# Patient Record
Sex: FEMALE | Race: BLACK OR AFRICAN AMERICAN | Employment: PART TIME | URBAN - METROPOLITAN AREA
[De-identification: names, ages, dates, MRNs, and addresses within clinical notes are randomized per-mention and may not be internally consistent; named-entity substitution may affect disease eponyms.]

---

## 2017-01-12 ENCOUNTER — OFFICE VISIT (OUTPATIENT)
Dept: INTERNAL MEDICINE | Facility: CLINIC | Age: 42
End: 2017-01-12
Payer: COMMERCIAL

## 2017-01-12 VITALS
BODY MASS INDEX: 36.43 KG/M2 | HEART RATE: 66 BPM | SYSTOLIC BLOOD PRESSURE: 104 MMHG | HEIGHT: 66 IN | TEMPERATURE: 98.2 F | WEIGHT: 226.7 LBS | OXYGEN SATURATION: 98 % | DIASTOLIC BLOOD PRESSURE: 72 MMHG
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
## 2017-01-12 DIAGNOSIS — S39.012D STRAIN OF LUMBAR REGION, SUBSEQUENT ENCOUNTER: Primary | ICD-10-CM

## 2017-01-12 PROCEDURE — 99214 OFFICE O/P EST MOD 30 MIN: CPT | Performed by: INTERNAL MEDICINE
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
## 2017-01-24 ENCOUNTER — OFFICE VISIT (OUTPATIENT)
Dept: OBGYN | Facility: CLINIC | Age: 42
End: 2017-01-24
Payer: OTHER GOVERNMENT

## 2017-01-24 VITALS
OXYGEN SATURATION: 98 % | WEIGHT: 226 LBS | SYSTOLIC BLOOD PRESSURE: 102 MMHG | DIASTOLIC BLOOD PRESSURE: 70 MMHG | HEART RATE: 76 BPM | TEMPERATURE: 98.3 F | BODY MASS INDEX: 36.49 KG/M2

## 2017-01-24 DIAGNOSIS — R10.2 PELVIC PAIN IN FEMALE: Primary | ICD-10-CM

## 2017-01-24 DIAGNOSIS — N93.9 ABNORMAL UTERINE BLEEDING (AUB): ICD-10-CM

## 2017-01-24 PROCEDURE — 99214 OFFICE O/P EST MOD 30 MIN: CPT | Performed by: OBSTETRICS & GYNECOLOGY

## 2017-01-24 NOTE — PROGRESS NOTES
SUBJECTIVE:                                                   Melissa Painter is a 41 year old  female who presents to clinic today for evaluation of pelvic pain and pressure which has increased significantly this month but going on infrequently over the past few months. Now pain occurs 1-2x/day, stabbing pain in lower abdomen, R>L. Maybe worse with activity, but unsure. Improves with BM. Ibuprofen helps a little. Does not appear to be cyclic this month, but previously may have been associated with menstruation.  - Struggles with constipation, tries eating more fruits and veg. BMs 1-2x/day  - No vaginal discharge/itching/burning    Menses are heavy, occuring once monthly, lasting 5 days. Patient's last menstrual period was 2017 (approximate). Occasional spotting.     + Dyspareunia. No vaginal dryness    Problem list and histories reviewed & adjusted, as indicated.  Additional history: as documented.    ROS:  Const: + weight gain of 20lb in 4 months. No fever, chills  : no dysuria, hematuria, urinary frequency, urgency, hesitancy  GI: + constipation, no diarrhea    Patient Active Problem List   Diagnosis     Iron deficiency anemia, unspecified iron deficiency     Chronic fatigue     Vitamin D deficiency     Hair loss     Immunity status testing     Excessive or frequent menstruation     CARDIOVASCULAR SCREENING; LDL GOAL LESS THAN 160     Seasonal allergic rhinitis     Rectocele     Cystocele, midline     Urgency-frequency syndrome     Cervicalgia     Acute bilateral low back pain without sciatica     Past Surgical History   Procedure Laterality Date     As hysteros w permanent fallopain implant       ESSURE     [place] teeth        Social History   Substance Use Topics     Smoking status: Never Smoker      Smokeless tobacco: Never Used     Alcohol Use: 0.0 oz/week     0 Standard drinks or equivalent per week      Comment: 2-3 glasses wine/yr      Problem (# of Occurrences) Relation (Name,Age of Onset)     Hypertension (1) Mother              Current Outpatient Prescriptions on File Prior to Visit:  IBUPROFEN PO    cyclobenzaprine (FLEXERIL) 5 MG tablet Take 1 tablet (5 mg) by mouth 3 times daily as needed for muscle spasms   ibuprofen (ADVIL,MOTRIN) 600 MG tablet Take 1 tablet (600 mg) by mouth every 6 hours as needed for moderate pain   Calcium Carb-Cholecalciferol (CALCIUM 1000 + D) 1000-800 MG-UNIT TABS Take 1 tablet by mouth daily TAKE WITH FOOD, FOR BONE HEALTH AND FOR VITAMIN D SUPPLEMENTATION   cholecalciferol 5000 UNITS CAPS Take 1 capsule (5,000 Units) by mouth daily FOR VITAMIN D DEFICIENCY (LOW VITAMIN D),TAKE 2 CAPSULES FOR THE FIRST 3 WEEKS, THEN 1 CAPSULE DAILY, MUST TAKE WITH WITH FAT CONTAINING MEAL   ferrous fumarate 65 mg, [place]. FE,-Vitamin C 125 mg (VITRON C)  MG TABS Take 1 tablet by mouth 2 times daily (before meals) INDICATION: IRON SUPPLEMENT   fluticasone (FLONASE) 50 MCG/ACT nasal spray Spray 2 sprays in nostril At Bedtime INDICATION: TO CONTROL NASAL ALLERGY SYMPTOMS   cetirizine (ZYRTEC) 10 MG tablet Take 1 tablet (10 mg) by mouth daily TO TREAT NASAL ALLERGIES     No current facility-administered medications on file prior to visit.  Allergies   Allergen Reactions     Levaquin [Levofloxacin] Itching     Influenza Vaccine Live        OBJECTIVE:   /70 mmHg  Pulse 76  Temp(Src) 98.3  F (36.8  C) (Oral)  Wt 226 lb (102.513 kg)  SpO2 98%  LMP 01/13/2017 (Approximate)   Gen: sitting in chair in no acute distress, comfortable  HENT: no scleral icterus, thyroid normal size  CV: regular rate, well perfused  Pulm: no increased work of breathing, no cough  Skin: warm and dry, no rashes/lesions  Psych: mood stable, appropriate affect  Neuro: A+Ox3   : External genitalia normal well-estrogenized, healthy tissue.  No obvious excoriations, lesions, or rashes. Bartholins, urethra, normal.  Normal moist pink vaginal mucosa.  SSE: Cervix enlarged and bulky, normal physiologic discharge.    Bimanual: No CMT, midline uterus, 6 week size. No adnexal masses or tenderness appreciated.       ASSESSMENT/PLAN:                                                    Melissa Painter is a 41 year old female  here for worsening pelvic pain. Exam concerning for fibroid uterus which may or may not be contributing to pelvic pain. Not reproducible on exam. Unclear whether GI given improvement with BM, MSK given association with activity, or .     1. Pelvic pain in female  - US Pelvic Complete with Transvaginal; Future   - will record timing and associated symptoms, history unclear at this time  - will try regular fiber supplements or stool softeners, increased activity particularly after meals, and work on weight loss    2. AUB  - Intermenstrual spotting, eval with pelvic US  - endometrial biopsy if indicated by US findings    Return to clinic in 2-4 weeks s/p US    Today I spent 40 minutes with the patient, of which > 50% was spent reviewing pelvic pain, abnormal uterine bleeding, weight loss, diet, and exercise.     Merari Rodney MD  Obstetrics and Gynecology   [facility]

## 2017-01-24 NOTE — NURSING NOTE
"Chief Complaint   Patient presents with     RECHECK     essure       Initial /70 mmHg  Pulse 76  Temp(Src) 98.3  F (36.8  C) (Oral)  Wt 226 lb (102.513 kg)  SpO2 98%  LMP 2017 (Approximate) Estimated body mass index is 36.49 kg/(m^2) as calculated from the following:    Height as of 17: 5' 6\" (1.676 m).    Weight as of this encounter: 226 lb (102.513 kg).  BP completed using cuff size: regular        The following HM Due: NONE      The following patient reported/Care Every where data was sent to:  P ABSTRACT QUALITY INITIATIVES [[id number]]              Pt states having discomfort in the pelvic area  Spotting two weeks before period.  Light first day light next days 2-3  heavy day 4 stops, day  5 heavy and then stops    Haley Bryant CMA         "

## 2017-01-27 ENCOUNTER — RADIANT APPOINTMENT (OUTPATIENT)
Dept: ULTRASOUND IMAGING | Facility: CLINIC | Age: 42
End: 2017-01-27
Attending: OBSTETRICS & GYNECOLOGY
Payer: OTHER GOVERNMENT

## 2017-01-27 DIAGNOSIS — R10.2 PELVIC PAIN IN FEMALE: ICD-10-CM

## 2017-01-27 PROCEDURE — 76830 TRANSVAGINAL US NON-OB: CPT | Performed by: OBSTETRICS & GYNECOLOGY

## 2017-01-27 PROCEDURE — 76856 US EXAM PELVIC COMPLETE: CPT | Performed by: OBSTETRICS & GYNECOLOGY

## 2017-02-06 ENCOUNTER — OFFICE VISIT (OUTPATIENT)
Dept: OBGYN | Facility: CLINIC | Age: 42
End: 2017-02-06
Payer: OTHER GOVERNMENT

## 2017-02-06 VITALS
SYSTOLIC BLOOD PRESSURE: 100 MMHG | DIASTOLIC BLOOD PRESSURE: 68 MMHG | HEART RATE: 65 BPM | OXYGEN SATURATION: 98 % | WEIGHT: 222.3 LBS | HEIGHT: 66 IN | RESPIRATION RATE: 16 BRPM | BODY MASS INDEX: 35.72 KG/M2

## 2017-02-06 DIAGNOSIS — N83.8 OVARIAN MASS, RIGHT: Primary | ICD-10-CM

## 2017-02-06 DIAGNOSIS — N93.9 ABNORMAL UTERINE BLEEDING (AUB): ICD-10-CM

## 2017-02-06 DIAGNOSIS — R10.2 PELVIC PAIN IN FEMALE: ICD-10-CM

## 2017-02-06 PROCEDURE — 88305 TISSUE EXAM BY PATHOLOGIST: CPT | Performed by: OBSTETRICS & GYNECOLOGY

## 2017-02-06 PROCEDURE — 99213 OFFICE O/P EST LOW 20 MIN: CPT | Mod: 25 | Performed by: OBSTETRICS & GYNECOLOGY

## 2017-02-06 PROCEDURE — 58100 BIOPSY OF UTERUS LINING: CPT | Performed by: OBSTETRICS & GYNECOLOGY

## 2017-02-06 NOTE — NURSING NOTE
"Chief Complaint   Patient presents with     Results     US       Initial /68 mmHg  Pulse 65  Resp 16  Ht 5' 6\" (1.676 m)  Wt 222 lb 4.8 oz (100.835 kg)  BMI 35.90 kg/m2  SpO2 98%  LMP 01/13/2017 (Approximate) Estimated body mass index is 35.9 kg/(m^2) as calculated from the following:    Height as of this encounter: 5' 6\" (1.676 m).    Weight as of this encounter: 222 lb 4.8 oz (100.835 kg).  Medication Reconciliation: incomplete   States pain is the same  Consent signed for Endometrial biopsy  Vivian Negrete LPN      "

## 2017-02-06 NOTE — PROGRESS NOTES
"F/u AUB and abdominal pain    S: Melissa Painter is a 41 year old female here for f/u of abd pain and AUB. Patient's last menstrual period was 01/13/2017 (approximate). Pain improved somewhat with stool softeners, now a 3-4/10, occuring every other day. Cannot recall any other associated exacerbating or alleviating features.    ROS:   GI: continues to have BMs 1-2x daily, now softer and less straining  : no further intermenstrual bleeding    O: /68 mmHg  Pulse 65  Resp 16  Ht 5' 6\" (1.676 m)  Wt 222 lb 4.8 oz (100.835 kg)  BMI 35.90 kg/m2  SpO2 98%  LMP 01/13/2017 (Approximate)   Gen: resting comfortably, in NAD    PROCEDURE;                                                    Pregnancy test not indicated, s/p Essure procedure    A speculum was placed in the vagina and cervix prepped with betadine. A tenaculum was not attached to the cervix. A small plastic 5 mm Pipelle syringe curette was inserted into the cervical canal. The uterus was sounded to 10 cm's. A vigorous four quadrant biopsy was performed, removing amount large  of tissue. The speculum was removed. This tissue was placed in Formalin and sent to pathology.    The patient tolerated the procedure  well and she reported there was minimal cramping.      POST PROCEDURE;                                                    Melissa Painter is a 41 year old female  here to review US results and continue work up for pelvic pain and AUB.    There  was  mild cramping at the time of discharge. She  tolerated the procedure well. There were no complications. Patient was discharged in stable condition.    US results:  - thickened EMS of 21mm: endometrial biopsy today  - 2cm complex right ovarian cyst: repeat US in 2 months    Pelvic pain:  - reports substantial improvement with high fiber diet and stool softeners, still some difficulty determining timing of pain and exacerbating features.   - continue high fiber and stool softeners.   - no evidence of " US findings to explain pelvic pain    F/u in 2 months after repeat US to evaluate right complex ovarian cyst.    Patient advised to call the clinic if severe pelvic pain, fever or heavy bleeding.    Outside of the procedure I spent 15 minutes with the patient, of which >50% minutes was spent reviewing causes of lower abdominal pain and management options as well as etiology and management of ovarian cysts.     Merari Rodney MD

## 2017-02-08 LAB — COPATH REPORT: NORMAL

## 2017-03-07 ENCOUNTER — OFFICE VISIT (OUTPATIENT)
Dept: OBGYN | Facility: CLINIC | Age: 42
End: 2017-03-07
Payer: OTHER GOVERNMENT

## 2017-03-07 VITALS
DIASTOLIC BLOOD PRESSURE: 70 MMHG | OXYGEN SATURATION: 98 % | WEIGHT: 220 LBS | BODY MASS INDEX: 35.51 KG/M2 | HEART RATE: 66 BPM | SYSTOLIC BLOOD PRESSURE: 102 MMHG

## 2017-03-07 DIAGNOSIS — N83.291 COMPLEX CYST OF RIGHT OVARY: ICD-10-CM

## 2017-03-07 DIAGNOSIS — N94.6 DYSMENORRHEA: ICD-10-CM

## 2017-03-07 DIAGNOSIS — N93.9 ABNORMAL UTERINE BLEEDING (AUB): Primary | ICD-10-CM

## 2017-03-07 DIAGNOSIS — G89.29 CHRONIC PELVIC PAIN IN FEMALE: ICD-10-CM

## 2017-03-07 DIAGNOSIS — R10.2 CHRONIC PELVIC PAIN IN FEMALE: ICD-10-CM

## 2017-03-07 PROCEDURE — 99214 OFFICE O/P EST MOD 30 MIN: CPT | Performed by: OBSTETRICS & GYNECOLOGY

## 2017-03-07 RX ORDER — CYCLOBENZAPRINE HCL 5 MG
TABLET ORAL
COMMUNITY
Start: 2016-11-23 | End: 2017-05-30

## 2017-03-07 NOTE — MR AVS SNAPSHOT
After Visit Summary   3/7/2017    Melissa Painter    MRN: [id number]           Patient Information     Date Of Birth          1975        Visit Information        Provider Department      3/7/2017 3:45 PM Merari Rodney MD [facility]        Today's Diagnoses     Abnormal uterine bleeding (AUB)    -  1    Dysmenorrhea        Chronic pelvic pain in female        Complex cyst of right ovary           Follow-ups after your visit        Your next 10 appointments already scheduled     Mar 23, 2017 10:30 AM CDT   Office Visit with Kaci Francisco MD   [facility]    [address] [place] [address] [id number] [phone]           Bring a current list of meds and any records pertaining to this visit.  For Physicals, please bring immunization records and any forms needing to be filled out.  Please arrive 10 minutes early to complete paperwork.            Mar 30, 2017  9:45 AM CDT   US PELVIC COMPLETE W TRANSVAGINAL with OXUS1   [facility]    [address] [place] [address] [id number] [phone]           Please bring a list of your medicines (including vitamins, minerals and over-the-counter drugs). Also, tell your doctor about any allergies you may have. Wear comfortable clothes and leave your valuables at home.  Adults: Drink six 8-ounce glasses of fluid one hour before your exam. Do NOT empty your bladder.  If you need to empty your bladder before your exam, try to release only a little bit of urine. Then, drink another 8oz glass of fluid.  Children: Children who are potty trained should drink at least 4 cups (32 oz) of liquid 45 minutes to one hour prior to the exam. The child s bladder must be full in order to achieve a diagnostic exam. If your child is very uncomfortable or has an urgent need to pee, please notify a technologist;  they will try to find out how much longer the wait may be and provide instructions to help relieve the pressure. Occasionally it is medically necessary to insert a urinary catheter to fill the bladder.  Please call the Imaging Department at your exam site with any questions.              Who to contact     If you have questions or need follow up information about today's clinic visit or your schedule please contact [facility] directly at [phone].  Normal or non-critical lab and imaging results will be communicated to you by [employer], letter or phone within 4 business days after the clinic has received the results. If you do not hear from us within 7 days, please contact the clinic through [employer] or phone. If you have a critical or abnormal lab result, we will notify you by phone as soon as possible.  Submit refill requests through [employer] or call your pharmacy and they will forward the refill request to us. Please allow 3 business days for your refill to be completed.          Additional Information About Your Visit        [employer] Information     [employer] gives you secure access to your electronic health record. If you see a primary care provider, you can also send messages to your care team and make appointments. If you have questions, please call your primary care clinic.  If you do not have a primary care provider, please call [phone] and they will assist you.        Care EveryWhere ID     This is your Care EveryWhere ID. This could be used by other organizations to access your [facility] medical records  [id number]        Your Vitals Were     Pulse Last Period Pulse Oximetry BMI (Body Mass Index)          66 02/08/2017 (Exact Date) 98% 35.51 kg/m2         Blood Pressure from Last 3 Encounters:   03/07/17 102/70   02/06/17 100/68   01/24/17 102/70    Weight from Last 3 Encounters:   03/07/17 220 lb (99.8 kg)   02/06/17 222 lb 4.8 oz (100.8 kg)   01/24/17 226 lb (102.5 kg)               Today, you had the following     No orders found for display       Primary Care Provider Office Phone # Fax #    Marthanina NINA Francisco [phone]       [facility] [address] [facility] [address]        Thank you!     Thank you for choosing [facility]  for your care. Our goal is always to provide you with excellent care. Hearing back from our patients is one way we can continue to improve our services. Please take a few minutes to complete the written survey that you may receive in the mail after your visit with us. Thank you!             Your Updated Medication List - Protect others around you: Learn how to safely use, store and throw away your medicines at www.disposemymeds.org.          This list is accurate as of: 3/7/17  4:34 PM.  Always use your most recent med list.                   Brand Name Dispense Instructions for use    Calcium Carb-Cholecalciferol 1000-800 MG-UNIT Tabs    CALCIUM 1000 + D    100 tablet    Take 1 tablet by mouth daily TAKE WITH FOOD, FOR BONE HEALTH AND FOR VITAMIN D SUPPLEMENTATION       cholecalciferol 5000 UNITS Caps     100 capsule    Take 1 capsule (5,000 Units) by mouth daily FOR VITAMIN D DEFICIENCY (LOW VITAMIN D),TAKE 2 CAPSULES FOR THE FIRST 3 WEEKS, THEN 1 CAPSULE DAILY, MUST TAKE WITH WITH FAT CONTAINING MEAL       cyclobenzaprine 5 MG tablet    FLEXERIL         ferrous fumarate 65 mg ([place]. FE)-Vitamin C 125 mg  MG Tabs tablet    VITRON C    100 tablet    Take 1 tablet by mouth 2 times daily (before meals) INDICATION: IRON SUPPLEMENT       fluticasone 50 MCG/ACT spray    FLONASE    16 g    Spray 2 sprays in nostril At Bedtime INDICATION: TO CONTROL NASAL ALLERGY SYMPTOMS       IBUPROFEN PO

## 2017-03-07 NOTE — NURSING NOTE
"Chief Complaint   Patient presents with     RECHECK       Initial /70  Pulse 66  Wt 220 lb (99.8 kg)  LMP 2017 (Exact Date)  SpO2 98%  BMI 35.51 kg/m2 Estimated body mass index is 35.51 kg/(m^2) as calculated from the following:    Height as of 17: 5' 6\" (1.676 m).    Weight as of this encounter: 220 lb (99.8 kg).  BP completed using cuff size: regular        The following HM Due: NONE      The following patient reported/Care Every where data was sent to:  P ABSTRACT QUALITY INITIATIVES [[id number]]       Longer cycle -13  Heavier with clots  Cramping before cycle    Haley Bryant, [facility]                "

## 2017-03-07 NOTE — PROGRESS NOTES
Gyn Visit    CC: AUB/Pelvic pain follow up    HPI: Melissa Solano is a 42 year old  female here for follow up. She reports that pain has essentially resolved with stool softeners and regular BMs. She Continues to have heavy periods with clots, Patient's last menstrual period was 2017 (exact date). Increasing dysmenorrhea.       O: /70  Pulse 66  Wt 220 lb (99.8 kg)  LMP 2017 (Exact Date)  SpO2 98%  BMI 35.51 kg/m2   Gen: resting comfortably, in NAD  No further physical exam indicated      Reviewed endometrial biopsy and US results as follows:    Indications for ultrasound:   Pain - Pelvic pain   LMP:  Hormones: no- essure   Measurements:   Uterus: 11.5 x 5.6 x 5.8 cm.   Position is anteverted. Contour is smth/reg.   Endo cav: 21 mm Smooth/regular/wnl   Cervix; wnl   Right ovary: 1.9 x 3.3 x 2.8 cm. Complex cyst 2.2 x 1.9 x 1.9 cm   Left ovary: 2.8 x 3.2 x 2.5 cm. Dominant follicle 2.0 x 1.6 x 1.9 cm   Cul de sac: free fluid - 1.5 x 1.0 cm   *Other findings:   ===================================   Complete pelvic ultrasound utilizing both abdominal and vaginal transducers. Small ,complex right ovarian cyst. Small amount of free fluid in the posterior cul de sac. Recommend repeat ultrasound in 6 -8 weeks.     Endometrial biopsy:  Patient Name: MELISSA SOLANO   MR#: [id number]   Specimen #: [id number]   Collected: 2017   Received: 2017   Reported: 2017 16:22   Ordering Phy(s): NESTOR CONCEPCION   Additional Phy(s): SHARON BUENO       SPECIMEN(S):   Endometrial biopsy     FINAL DIAGNOSIS:   Endometrium, biopsy   - Late secretory endometrium   - No evidence of chronic endometritis   - No hyperplasia or malignancy       A/P: Melissa Solano is a 42 year old female  here for pelvic pain and AUB follow up.    1. Abnormal uterine bleeding (AUB)  - No US evidence of structural abnormalities to explain heavy vaginal bleeding.   - endometrial biopsy w/o evidence of  malignancy or premalignant changes  - discussed management options including oral contraceptives, mirena IUD, and endometrial ablation. Patient reports hx of continuous bleeding on oral contraceptives and prefers to avoid these. She will consider Mirena vs ablation, hand outs provided. She has essure for permanent sterilization and therefore would not need further contraception for an ablation.     2. Dysmenorrhea  - discussed ovulatory suppression vs endometrial ablation for management of painful periods, see above    3. Chronic pelvic pain in female  - resolved with stool softeners, likely GI in origin     4. Complex right ovarian cyst  - repeat US in 8 weeks    Total time spent was 25 minutes; greater than 50% of time was spent in counseling and/or coordination of care for the above listed diagnoses.      F/u after repeat US.    Merari Rodney MD

## 2017-03-30 ENCOUNTER — RADIANT APPOINTMENT (OUTPATIENT)
Dept: ULTRASOUND IMAGING | Facility: CLINIC | Age: 42
End: 2017-03-30
Attending: OBSTETRICS & GYNECOLOGY
Payer: OTHER GOVERNMENT

## 2017-03-30 DIAGNOSIS — N83.8 OVARIAN MASS, RIGHT: ICD-10-CM

## 2017-03-30 PROCEDURE — 76856 US EXAM PELVIC COMPLETE: CPT | Performed by: OBSTETRICS & GYNECOLOGY

## 2017-03-30 PROCEDURE — 76830 TRANSVAGINAL US NON-OB: CPT | Mod: 59 | Performed by: OBSTETRICS & GYNECOLOGY

## 2017-05-04 DIAGNOSIS — J30.2 SEASONAL ALLERGIC RHINITIS: Primary | ICD-10-CM

## 2017-05-04 RX ORDER — CETIRIZINE HYDROCHLORIDE 10 MG/1
TABLET ORAL
Qty: 90 TABLET | Refills: 2 | Status: SHIPPED | OUTPATIENT
Start: 2017-05-04

## 2017-05-30 ENCOUNTER — OFFICE VISIT (OUTPATIENT)
Dept: INTERNAL MEDICINE | Facility: CLINIC | Age: 42
End: 2017-05-30
Payer: OTHER GOVERNMENT

## 2017-05-30 VITALS
WEIGHT: 224.7 LBS | RESPIRATION RATE: 16 BRPM | BODY MASS INDEX: 36.11 KG/M2 | SYSTOLIC BLOOD PRESSURE: 110 MMHG | DIASTOLIC BLOOD PRESSURE: 84 MMHG | HEART RATE: 70 BPM | OXYGEN SATURATION: 98 % | TEMPERATURE: 98.2 F | HEIGHT: 66 IN

## 2017-05-30 DIAGNOSIS — R71.8 LOW MEAN CORPUSCULAR VOLUME (MCV): ICD-10-CM

## 2017-05-30 DIAGNOSIS — E55.9 VITAMIN D DEFICIENCY: ICD-10-CM

## 2017-05-30 DIAGNOSIS — R53.82 CHRONIC FATIGUE: Primary | ICD-10-CM

## 2017-05-30 DIAGNOSIS — L65.9 HAIR LOSS: ICD-10-CM

## 2017-05-30 DIAGNOSIS — Z13.6 CARDIOVASCULAR SCREENING; LDL GOAL LESS THAN 160: ICD-10-CM

## 2017-05-30 DIAGNOSIS — E61.1 IRON DEFICIENCY: ICD-10-CM

## 2017-05-30 DIAGNOSIS — Z12.31 VISIT FOR SCREENING MAMMOGRAM: ICD-10-CM

## 2017-05-30 DIAGNOSIS — R93.7 ABNORMAL X-RAY OF THORACIC SPINE: ICD-10-CM

## 2017-05-30 DIAGNOSIS — R79.89 ELEVATED VITAMIN B12 LEVEL: ICD-10-CM

## 2017-05-30 PROBLEM — B18.1 HEPATITIS B CARRIER (H): Status: ACTIVE | Noted: 2017-05-30

## 2017-05-30 LAB
ALBUMIN SERPL-MCNC: 3.6 G/DL (ref 3.4–5)
ALBUMIN UR-MCNC: NEGATIVE MG/DL
ALP SERPL-CCNC: 51 U/L (ref 40–150)
ALT SERPL W P-5'-P-CCNC: 18 U/L (ref 0–50)
ANION GAP SERPL CALCULATED.3IONS-SCNC: 8 MMOL/L (ref 3–14)
APPEARANCE UR: CLEAR
AST SERPL W P-5'-P-CCNC: 14 U/L (ref 0–45)
BACTERIA #/AREA URNS HPF: ABNORMAL /HPF
BILIRUB SERPL-MCNC: 0.6 MG/DL (ref 0.2–1.3)
BILIRUB UR QL STRIP: NEGATIVE
BUN SERPL-MCNC: 14 MG/DL (ref 7–30)
CALCIUM SERPL-MCNC: 8.7 MG/DL (ref 8.5–10.1)
CHLORIDE SERPL-SCNC: 106 MMOL/L (ref 94–109)
CO2 SERPL-SCNC: 25 MMOL/L (ref 20–32)
COLOR UR AUTO: YELLOW
CREAT SERPL-MCNC: 0.71 MG/DL (ref 0.52–1.04)
FERRITIN SERPL-MCNC: 10 NG/ML (ref 12–150)
GFR SERPL CREATININE-BSD FRML MDRD: NORMAL ML/MIN/1.7M2
GLUCOSE SERPL-MCNC: 89 MG/DL (ref 70–99)
GLUCOSE UR STRIP-MCNC: NEGATIVE MG/DL
HGB UR QL STRIP: NEGATIVE
IRON SATN MFR SERPL: 43 % (ref 15–46)
IRON SERPL-MCNC: 126 UG/DL (ref 35–180)
KETONES UR STRIP-MCNC: NEGATIVE MG/DL
LDLC SERPL DIRECT ASSAY-MCNC: 70 MG/DL
LEUKOCYTE ESTERASE UR QL STRIP: NEGATIVE
NITRATE UR QL: NEGATIVE
NON-SQ EPI CELLS #/AREA URNS LPF: ABNORMAL /LPF
PH UR STRIP: 6 PH (ref 5–7)
POTASSIUM SERPL-SCNC: 4 MMOL/L (ref 3.4–5.3)
PROT SERPL-MCNC: 7.7 G/DL (ref 6.8–8.8)
RBC #/AREA URNS AUTO: ABNORMAL /HPF (ref 0–2)
RETICS # AUTO: 115 10E9/L (ref 25–95)
RETICS/RBC NFR AUTO: 2.4 % (ref 0.5–2)
SODIUM SERPL-SCNC: 139 MMOL/L (ref 133–144)
SP GR UR STRIP: 1.02 (ref 1–1.03)
T4 FREE SERPL-MCNC: 1.02 NG/DL (ref 0.76–1.46)
TIBC SERPL-MCNC: 295 UG/DL (ref 240–430)
TSH SERPL DL<=0.05 MIU/L-ACNC: 1.37 MU/L (ref 0.4–4)
URN SPEC COLLECT METH UR: ABNORMAL
UROBILINOGEN UR STRIP-ACNC: 0.2 EU/DL (ref 0.2–1)
VIT B12 SERPL-MCNC: 1407 PG/ML (ref 193–986)
WBC #/AREA URNS AUTO: ABNORMAL /HPF (ref 0–2)

## 2017-05-30 PROCEDURE — 82607 VITAMIN B-12: CPT | Performed by: INTERNAL MEDICINE

## 2017-05-30 PROCEDURE — 82306 VITAMIN D 25 HYDROXY: CPT | Performed by: INTERNAL MEDICINE

## 2017-05-30 PROCEDURE — 99215 OFFICE O/P EST HI 40 MIN: CPT | Performed by: INTERNAL MEDICINE

## 2017-05-30 PROCEDURE — 99000 SPECIMEN HANDLING OFFICE-LAB: CPT | Performed by: INTERNAL MEDICINE

## 2017-05-30 PROCEDURE — 85045 AUTOMATED RETICULOCYTE COUNT: CPT | Performed by: INTERNAL MEDICINE

## 2017-05-30 PROCEDURE — 81001 URINALYSIS AUTO W/SCOPE: CPT | Performed by: INTERNAL MEDICINE

## 2017-05-30 PROCEDURE — 82728 ASSAY OF FERRITIN: CPT | Performed by: INTERNAL MEDICINE

## 2017-05-30 PROCEDURE — 00000402 ZZHCL STATISTIC TOTAL PROTEIN: Performed by: INTERNAL MEDICINE

## 2017-05-30 PROCEDURE — 84207 ASSAY OF VITAMIN B-6: CPT | Mod: 90 | Performed by: INTERNAL MEDICINE

## 2017-05-30 PROCEDURE — 84439 ASSAY OF FREE THYROXINE: CPT | Performed by: INTERNAL MEDICINE

## 2017-05-30 PROCEDURE — 36415 COLL VENOUS BLD VENIPUNCTURE: CPT | Performed by: INTERNAL MEDICINE

## 2017-05-30 PROCEDURE — 83550 IRON BINDING TEST: CPT | Performed by: INTERNAL MEDICINE

## 2017-05-30 PROCEDURE — 85060 BLOOD SMEAR INTERPRETATION: CPT | Performed by: INTERNAL MEDICINE

## 2017-05-30 PROCEDURE — 82180 ASSAY OF ASCORBIC ACID: CPT | Mod: 90 | Performed by: INTERNAL MEDICINE

## 2017-05-30 PROCEDURE — 80050 GENERAL HEALTH PANEL: CPT | Performed by: INTERNAL MEDICINE

## 2017-05-30 PROCEDURE — 83721 ASSAY OF BLOOD LIPOPROTEIN: CPT | Performed by: INTERNAL MEDICINE

## 2017-05-30 PROCEDURE — 84165 PROTEIN E-PHORESIS SERUM: CPT | Performed by: INTERNAL MEDICINE

## 2017-05-30 PROCEDURE — 83540 ASSAY OF IRON: CPT | Performed by: INTERNAL MEDICINE
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
## 2017-05-30 NOTE — PATIENT INSTRUCTIONS
** FOLLOW UP PLAN**:    PLEASE SCHEDULE OFFICE VISIT SOONEST AVAILABILITY FROM TODAY TO FOLLOW UP ON  Chronic fatigue  Vitamin and mineral Deficiencies  Elevated vitamin B12 level  Low mean corpuscular volume (MCV)  Abnormal x-ray of thoracic spine      BE SURE TO SCHEDULE YOUR LAB DRAW APPOINTMENT FOR AT LEAST 5 DAYS BEFORE YOUR NEXT VISIT      YOU HAVE BEEN REFERRED FOR BONE DENSITY, SCREENING MAMMOGRAM SCANS TO ADDRESS ISSUES RAISED TODAY  PLEASE  MAKE SURE TO SCHEDULE BONE DENSITY APPOINTMENT(S) AT THE   OR BY CALLING THE NUMBER BELOW AND  SCREENING MAMMOGRAM APPOINTMENT(S) BY TELEPHONE      YOU MAY CONTACT THE CLINIC IF ANY QUESTIONS OR CONCERNS -[phone] OR VIA [employer]

## 2017-05-30 NOTE — MR AVS SNAPSHOT
After Visit Summary   5/30/2017    Melissa Painter    MRN: [id number]           Patient Information     Date Of Birth          1975        Visit Information        Provider Department      5/30/2017 10:00 AM Kaci Francsico MD [facility]        Today's Diagnoses     Chronic fatigue    -  1    Vitamin D deficiency        Hair loss        Elevated vitamin B12 level        Low mean corpuscular volume (MCV)        Abnormal x-ray of thoracic spine        Iron deficiency        CARDIOVASCULAR SCREENING; LDL GOAL LESS THAN 160        Visit for screening mammogram          Care Instructions    ** FOLLOW UP PLAN**:    PLEASE SCHEDULE OFFICE VISIT SOONEST AVAILABILITY FROM TODAY TO FOLLOW UP ON  Chronic fatigue  Vitamin and mineral Deficiencies  Elevated vitamin B12 level  Low mean corpuscular volume (MCV)  Abnormal x-ray of thoracic spine      BE SURE TO SCHEDULE YOUR LAB DRAW APPOINTMENT FOR AT LEAST 5 DAYS BEFORE YOUR NEXT VISIT      YOU HAVE BEEN REFERRED FOR BONE DENSITY, SCREENING MAMMOGRAM SCANS TO ADDRESS ISSUES RAISED TODAY  PLEASE  MAKE SURE TO SCHEDULE BONE DENSITY APPOINTMENT(S) AT THE   OR BY CALLING THE NUMBER BELOW AND  SCREENING MAMMOGRAM APPOINTMENT(S) BY TELEPHONE      YOU MAY CONTACT THE CLINIC IF ANY QUESTIONS OR CONCERNS -[phone] OR VIA [employer]                     Follow-ups after your visit        Future tests that were ordered for you today     Open Future Orders        Priority Expected Expires Ordered    MA Screen Bilateral w/Kennedy Routine 5/30/2017 5/30/2018 5/30/2017    DX Hip/Pelvis/Spine Routine 5/30/2017 5/29/2018 5/30/2017            Who to contact     If you have questions or need follow up information about today's clinic visit or your schedule please contact [facility] directly at [phone].  Normal or non-critical lab and imaging results will be communicated to you by MyChart, letter or phone within 4  "business days after the clinic has received the results. If you do not hear from us within 7 days, please contact the clinic through [employer] or phone. If you have a critical or abnormal lab result, we will notify you by phone as soon as possible.  Submit refill requests through [employer] or call your pharmacy and they will forward the refill request to us. Please allow 3 business days for your refill to be completed.          Additional Information About Your Visit        Linkage Information     [employer] gives you secure access to your electronic health record. If you see a primary care provider, you can also send messages to your care team and make appointments. If you have questions, please call your primary care clinic.  If you do not have a primary care provider, please call [phone] and they will assist you.        Care EveryWhere ID     This is your Care EveryWhere ID. This could be used by other organizations to access your [place] medical records  [id number]        Your Vitals Were     Pulse Temperature Respirations Height Last Period Pulse Oximetry    70 98.2  F (36.8  C) (Oral) 16 5' 6\" (1.676 m) 05/09/2017 98%    BMI (Body Mass Index)                   36.27 kg/m2            Blood Pressure from Last 3 Encounters:   05/30/17 110/84   03/07/17 102/70   02/06/17 100/68    Weight from Last 3 Encounters:   05/30/17 224 lb 11.2 oz (101.9 kg)   03/07/17 220 lb (99.8 kg)   02/06/17 222 lb 4.8 oz (100.8 kg)              We Performed the Following     Blood Morphology Pathologist Review     CBC with platelets differential     Comprehensive metabolic panel     Ferritin     Iron and iron binding capacity     LDL cholesterol direct     Protein electrophoresis     Reticulocyte Count     T4 FREE     TSH     UA with Microscopic reflex to Culture     Vitamin B12     Vitamin B6     Vitamin C     Vitamin D Deficiency          Today's Medication Changes          These changes are accurate as of: 5/30/17 11:12 AM.  If you " have any questions, ask your nurse or doctor.               These medicines have changed or have updated prescriptions.        Dose/Directions    cholecalciferol 5000 UNITS Caps   This may have changed:  additional instructions   Used for:  Chronic fatigue   Changed by:  Kaci Francisco MD        Dose:  1 capsule   Take 1 capsule (5,000 Units) by mouth daily INDICATION: LOW VITAMIN D, TAKE WITH FOOD   Quantity:  90 capsule   Refills:  3       ferrous fumarate 65 mg ([place]. FE)-Vitamin C 125 mg  MG Tabs tablet   Commonly known as:  VITRON C   This may have changed:  when to take this   Used for:  Chronic fatigue, Iron deficiency   Changed by:  Kaci Francisco MD        Dose:  1 tablet   Take 1 tablet by mouth daily INDICATION: IRON SUPPLEMENT   Quantity:  100 tablet   Refills:  prn            Where to get your medicines      These medications were sent to [facility] [place] [id number]     Phone:  [phone]     Calcium Carb-Cholecalciferol 1000-800 MG-UNIT Tabs    cholecalciferol 5000 UNITS Caps         Some of these will need a paper prescription and others can be bought over the counter.  Ask your nurse if you have questions.     Bring a paper prescription for each of these medications     ferrous fumarate 65 mg ([place]. FE)-Vitamin C 125 mg  MG Tabs tablet                Primary Care Provider Office Phone # Fax #    Kaci Francisco [phone]       [address] [address]        Thank you!     Thank you for choosing [facility]  for your care. Our goal is always to provide you with excellent care. Hearing back from our patients is one way we can continue to improve our services. Please take a few minutes to complete the written survey that you may receive in the mail after your visit with us. Thank you!             Your Updated Medication List  - Protect others around you: Learn how to safely use, store and throw away your medicines at www.disposemymeds.org.          This list is accurate as of: 5/30/17 11:12 AM.  Always use your most recent med list.                   Brand Name Dispense Instructions for use    Calcium Carb-Cholecalciferol 1000-800 MG-UNIT Tabs    CALCIUM 1000 + D    100 tablet    Take 1 tablet by mouth daily TAKE WITH FOOD, FOR BONE HEALTH AND FOR VITAMIN D SUPPLEMENTATION       cetirizine 10 MG tablet    zyrTEC    90 tablet    TAKE ONE TABLET BY MOUTH EVERY DAY TO TREAT NASAL ALLERGIES       cholecalciferol 5000 UNITS Caps     90 capsule    Take 1 capsule (5,000 Units) by mouth daily INDICATION: LOW VITAMIN D, TAKE WITH FOOD       ferrous fumarate 65 mg ([place]. FE)-Vitamin C 125 mg  MG Tabs tablet    VITRON C    100 tablet    Take 1 tablet by mouth daily INDICATION: IRON SUPPLEMENT       fluticasone 50 MCG/ACT spray    FLONASE    16 g    Spray 2 sprays in nostril At Bedtime INDICATION: TO CONTROL NASAL ALLERGY SYMPTOMS       IBUPROFEN PO

## 2017-05-30 NOTE — NURSING NOTE
"Chief Complaint   Patient presents with     Physical     no pap 2019       Initial /84  Pulse 70  Temp 98.2  F (36.8  C) (Oral)  Resp 16  Ht 5' 6\" (1.676 m)  Wt 224 lb 11.2 oz (101.9 kg)  LMP 05/09/2017  SpO2 98%  BMI 36.27 kg/m2 Estimated body mass index is 36.27 kg/(m^2) as calculated from the following:    Height as of this encounter: 5' 6\" (1.676 m).    Weight as of this encounter: 224 lb 11.2 oz (101.9 kg).  Blood pressure completed using cuff size: LARGE    "

## 2017-05-31 LAB
ALBUMIN SERPL ELPH-MCNC: 4 G/DL (ref 3.7–5.1)
ALPHA1 GLOB SERPL ELPH-MCNC: 0.3 G/DL (ref 0.2–0.4)
ALPHA2 GLOB SERPL ELPH-MCNC: 0.7 G/DL (ref 0.5–0.9)
B-GLOBULIN SERPL ELPH-MCNC: 0.9 G/DL (ref 0.6–1)
COPATH REPORT: NORMAL
DEPRECATED CALCIDIOL+CALCIFEROL SERPL-MC: 30 UG/L (ref 20–75)
GAMMA GLOB SERPL ELPH-MCNC: 1.7 G/DL (ref 0.7–1.6)
M PROTEIN SERPL ELPH-MCNC: 0 G/DL
PROT PATTERN SERPL ELPH-IMP: ABNORMAL

## 2017-06-01 ENCOUNTER — OFFICE VISIT (OUTPATIENT)
Dept: ORTHOPEDICS | Facility: CLINIC | Age: 42
End: 2017-06-01
Payer: COMMERCIAL

## 2017-06-01 VITALS
BODY MASS INDEX: 36 KG/M2 | DIASTOLIC BLOOD PRESSURE: 82 MMHG | HEIGHT: 66 IN | WEIGHT: 224 LBS | SYSTOLIC BLOOD PRESSURE: 110 MMHG

## 2017-06-01 DIAGNOSIS — M54.50 ACUTE BILATERAL LOW BACK PAIN WITHOUT SCIATICA: Primary | ICD-10-CM

## 2017-06-01 DIAGNOSIS — M51.369 DDD (DEGENERATIVE DISC DISEASE), LUMBAR: ICD-10-CM

## 2017-06-01 LAB
BASOPHILS # BLD AUTO: 0.1 10E9/L (ref 0–0.2)
BASOPHILS NFR BLD AUTO: 1 %
DIFFERENTIAL METHOD BLD: ABNORMAL
EOSINOPHIL # BLD AUTO: 0.2 10E9/L (ref 0–0.7)
EOSINOPHIL NFR BLD AUTO: 4 %
ERYTHROCYTE [DISTWIDTH] IN BLOOD BY AUTOMATED COUNT: 16.4 % (ref 10–15)
HCT VFR BLD AUTO: 35.3 % (ref 35–47)
HGB BLD-MCNC: 11.5 G/DL (ref 11.7–15.7)
LYMPHOCYTES # BLD AUTO: 1.8 10E9/L (ref 0.8–5.3)
LYMPHOCYTES NFR BLD AUTO: 34 %
MCH RBC QN AUTO: 24.3 PG (ref 26.5–33)
MCHC RBC AUTO-ENTMCNC: 32.6 G/DL (ref 31.5–36.5)
MCV RBC AUTO: 75 FL (ref 78–100)
MONOCYTES # BLD AUTO: 0.4 10E9/L (ref 0–1.3)
MONOCYTES NFR BLD AUTO: 8 %
NEUTROPHILS # BLD AUTO: 2.9 10E9/L (ref 1.6–8.3)
NEUTROPHILS NFR BLD AUTO: 53 %
PLATELET # BLD AUTO: 277 10E9/L (ref 150–450)
RBC # BLD AUTO: 4.74 10E12/L (ref 3.8–5.2)
VIT B6 SERPL-MCNC: 41.2 NG/ML
WBC # BLD AUTO: 5.4 10E9/L (ref 4–11)

## 2017-06-01 PROCEDURE — 99213 OFFICE O/P EST LOW 20 MIN: CPT | Performed by: FAMILY MEDICINE

## 2017-06-01 ASSESSMENT — ENCOUNTER SYMPTOMS
MYALGIAS: 1
TINGLING: 0
SENSORY CHANGE: 0
BACK PAIN: 1
FOCAL WEAKNESS: 0

## 2017-06-01 NOTE — NURSING NOTE
"Chief Complaint   Patient presents with     RECHECK     lower back pain       Initial /82  Ht 5' 6\" (1.676 m)  Wt 224 lb (101.6 kg)  LMP 05/09/2017  BMI 36.15 kg/m2 Estimated body mass index is 36.15 kg/(m^2) as calculated from the following:    Height as of this encounter: 5' 6\" (1.676 m).    Weight as of this encounter: 224 lb (101.6 kg).  Medication Reconciliation: complete     Edward Aparicio, ATC    "
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
## 2017-06-01 NOTE — MR AVS SNAPSHOT
After Visit Summary   6/1/2017    Melissa Painter    MRN: [id number]           Patient Information     Date Of Birth          1975        Visit Information        Provider Department      6/1/2017 9:40 AM Lupillo Lenz MD [facility]        Today's Diagnoses     Acute bilateral low back pain without sciatica    -  1    DDD (degenerative disc disease), lumbar           Follow-ups after your visit        Additional Services     PHYSICAL THERAPY REFERRAL       [facility]  [address], [address]  [place] [address]  Telephone: [phone]  Fax: [phone]      Treatment: Evaluation & Treatment  Special Instructions/Modalities: MedX  Special Programs: Medx    Recurrence of pain following prior PNBC course, pt would like to resume tx.     Please be aware that coverage of these services is subject to the terms and limitations of your health insurance plan.  Call member services at your health plan with any benefit or coverage questions.                  Your next 10 appointments already scheduled     Jun 02, 2017  2:00 PM CDT   DX HIP/PELVIS/SPINE with OXDX1   [facility]    [address] [place] [id number] [phone]           Please do not take any of the following 24 hours prior to the day of your exam: vitamins, calcium tablets, antacids.            Jun 08, 2017  2:00 PM CDT   Office Visit with Kaci Francisco MD   [facility] ([facility])    [address] [place] [id number] [phone]           Bring a current list of meds and any records pertaining to this visit.  For Physicals, please bring immunization records and any forms needing to be filled out.  Please arrive 10 minutes early to complete paperwork.              Who to contact     If you have questions or need  "follow up information about today's clinic visit or your schedule please contact [facility] directly at [phone].  Normal or non-critical lab and imaging results will be communicated to you by MyChart, letter or phone within 4 business days after the clinic has received the results. If you do not hear from us within 7 days, please contact the clinic through [employer] or phone. If you have a critical or abnormal lab result, we will notify you by phone as soon as possible.  Submit refill requests through BOKU or call your pharmacy and they will forward the refill request to us. Please allow 3 business days for your refill to be completed.          Additional Information About Your Visit        BOKU Information     BOKU gives you secure access to your electronic health record. If you see a primary care provider, you can also send messages to your care team and make appointments. If you have questions, please call your primary care clinic.  If you do not have a primary care provider, please call [phone] and they will assist you.        Care EveryWhere ID     This is your Care EveryWhere ID. This could be used by other organizations to access your [facility] medical records  [id number]        Your Vitals Were     Height Last Period BMI (Body Mass Index)             5' 6\" (1.676 m) 05/09/2017 36.15 kg/m2          Blood Pressure from Last 3 Encounters:   06/01/17 110/82   05/30/17 110/84   03/07/17 102/70    Weight from Last 3 Encounters:   06/01/17 224 lb (101.6 kg)   05/30/17 224 lb 11.2 oz (101.9 kg)   03/07/17 220 lb (99.8 kg)              We Performed the Following     PHYSICAL THERAPY REFERRAL        Primary Care Provider Office Phone # Fax #    Kaci Francisco [phone]       [facility] [address] [address]        Thank you!     Thank you for choosing [facility]  for your care. Our goal is always to provide you with excellent care. Hearing back from our patients is one way we can continue to improve our services. Please take a few minutes to complete the written survey that you may receive in the mail after your visit with us. Thank you!             Your Updated Medication List - Protect others around you: Learn how to safely use, store and throw away your medicines at www.disposemymeds.org.          This list is accurate as of: 6/1/17 10:19 AM.  Always use your most recent med list.                   Brand Name Dispense Instructions for use    Calcium Carb-Cholecalciferol 1000-800 MG-UNIT Tabs    CALCIUM 1000 + D    100 tablet    Take 1 tablet by mouth daily TAKE WITH FOOD, FOR BONE HEALTH AND FOR VITAMIN D SUPPLEMENTATION       cetirizine 10 MG tablet    zyrTEC    90 tablet    TAKE ONE TABLET BY MOUTH EVERY DAY TO TREAT NASAL ALLERGIES       cholecalciferol 5000 UNITS Caps     90 capsule    Take 1 capsule (5,000 Units) by mouth daily INDICATION: LOW VITAMIN D, TAKE WITH FOOD       ferrous fumarate 65 mg ([place]. FE)-Vitamin C 125 mg  MG Tabs tablet    VITRON C    100 tablet    Take 1 tablet by mouth daily INDICATION: IRON SUPPLEMENT       fluticasone 50 MCG/ACT spray    FLONASE    16 g    Spray 2 sprays in nostril At Bedtime INDICATION: TO CONTROL NASAL ALLERGY SYMPTOMS       IBUPROFEN PO

## 2017-06-02 ENCOUNTER — RADIANT APPOINTMENT (OUTPATIENT)
Dept: BONE DENSITY | Facility: CLINIC | Age: 42
End: 2017-06-02
Attending: INTERNAL MEDICINE
Payer: COMMERCIAL

## 2017-06-02 DIAGNOSIS — R93.7 ABNORMAL X-RAY OF THORACIC SPINE: ICD-10-CM

## 2017-06-02 LAB — VIT C SERPL-MCNC: 49 MG/DL

## 2017-06-02 PROCEDURE — 77080 DXA BONE DENSITY AXIAL: CPT | Performed by: INTERNAL MEDICINE

## 2017-06-08 ENCOUNTER — OFFICE VISIT (OUTPATIENT)
Dept: INTERNAL MEDICINE | Facility: CLINIC | Age: 42
End: 2017-06-08
Payer: COMMERCIAL

## 2017-06-08 VITALS
SYSTOLIC BLOOD PRESSURE: 120 MMHG | TEMPERATURE: 97.8 F | WEIGHT: 222.3 LBS | OXYGEN SATURATION: 100 % | HEART RATE: 68 BPM | RESPIRATION RATE: 16 BRPM | BODY MASS INDEX: 35.88 KG/M2 | DIASTOLIC BLOOD PRESSURE: 78 MMHG

## 2017-06-08 DIAGNOSIS — N92.0 MENORRHAGIA WITH REGULAR CYCLE: ICD-10-CM

## 2017-06-08 DIAGNOSIS — D50.9 IRON DEFICIENCY ANEMIA, UNSPECIFIED IRON DEFICIENCY ANEMIA TYPE: ICD-10-CM

## 2017-06-08 DIAGNOSIS — R53.82 CHRONIC FATIGUE: ICD-10-CM

## 2017-06-08 DIAGNOSIS — E55.9 VITAMIN D DEFICIENCY: Primary | ICD-10-CM

## 2017-06-08 PROCEDURE — 99214 OFFICE O/P EST MOD 30 MIN: CPT | Performed by: INTERNAL MEDICINE

## 2017-06-08 NOTE — PROGRESS NOTES
SUBJECTIVE:                                                    Melissa Painter is a 42 year old female who presents to clinic today for the following health issues:    Results, Fatigue follow up        Description (location/character/radiation): Fatigue    Intensity:  mild    Accompanying signs and symptoms: none    History (similar episodes/previous evaluation): None    Precipitating or alleviating factors: vitamins    Therapies tried and outcome: None       She reports that she has felt better with regards to fatigue and muscle aches and pains since her last office visit.      Problem list and histories reviewed & adjusted, as indicated.  Additional history: as documented    Labs reviewed in EPIC    Reviewed and updated as needed this visit by clinical staff  Tobacco  Allergies  Meds  Med Hx  Surg Hx  Fam Hx  Soc Hx      Reviewed and updated as needed this visit by Provider         ROS:  14 point ROS negative except as above      OBJECTIVE:                                                    /78  Pulse 68  Temp 97.8  F (36.6  C) (Oral)  Resp 16  Wt 222 lb 4.8 oz (100.8 kg)  LMP 05/09/2017  SpO2 100%  BMI 35.88 kg/m2  Body mass index is 35.88 kg/(m^2).  GENERAL: healthy, alert and no distress  NECK: no adenopathy, no asymmetry, masses, or scars and thyroid normal to palpation  RESP: lungs clear to auscultation - no rales, rhonchi or wheezes  CV: regular rate and rhythm, normal S1 S2, no S3 or S4, no murmur, click or rub, no peripheral edema and peripheral pulses strong  ABDOMEN: soft, nontender, no hepatosplenomegaly, no masses and bowel sounds normal  MS: no gross musculoskeletal defects noted, no edema    Diagnostic Test Results:  Results for orders placed or performed in visit on 05/30/17   CBC with platelets differential   Result Value Ref Range    WBC 5.4 4.0 - 11.0 10e9/L    RBC Count 4.74 3.8 - 5.2 10e12/L    Hemoglobin 11.5 (L) 11.7 - 15.7 g/dL    Hematocrit 35.3 35.0 - 47.0 %    MCV 75  (L) 78 - 100 fl    MCH 24.3 (L) 26.5 - 33.0 pg    MCHC 32.6 31.5 - 36.5 g/dL    RDW 16.4 (H) 10.0 - 15.0 %    Platelet Count 277 150 - 450 10e9/L    % Neutrophils 53.0 %    % Lymphocytes 34.0 %    % Monocytes 8.0 %    % Eosinophils 4.0 %    % Basophils 1.0 %    Absolute Neutrophil 2.9 1.6 - 8.3 10e9/L    Absolute Lymphocytes 1.8 0.8 - 5.3 10e9/L    Absolute Monocytes 0.4 0.0 - 1.3 10e9/L    Absolute Eosinophils 0.2 0.0 - 0.7 10e9/L    Absolute Basophils 0.1 0.0 - 0.2 10e9/L    Diff Method Automated Method    Reticulocyte Count   Result Value Ref Range    % Retic 2.4 (H) 0.5 - 2.0 %    Absolute Retic 115.0 (H) 25 - 95 10e9/L   Blood Morphology Pathologist Review   Result Value Ref Range    Copath Report       Patient Name: TARSHA SOLANO  MR#: [id number]  Specimen #: [id number]  Collected: 5/30/2017  Received: 5/31/2017  Reported: 5/31/2017 11:07  Ordering Phy(s): SHARON BUENO    For improved result formatting, select 'View Enhanced Report Format'  under Linked Documents section.    TEST(S):  Peripheral Smear Morphology    FINAL DIAGNOSIS:  Peripheral blood demonstrating microcytic anemia (see description)    COMMENT:  Causes for microcytic anemias typically include iron deficiency,  hemoglobinopathy, chronic infection, and sideroblastic anemia.  Additional considerations are lead poisoning and severe protein  deficiency.  Clinical correlation is required.    Electronically signed out by:    Nirav Barboza M.D.    PERIPHERAL BLOOD DATA:    PERIPHERAL BLOOD DATA  Patient Value (Reference Range >18 year old female)  5.43 .......WBC   (4.0-11.0 x 10*9/L)  4.74 .......RBC   (3.8-5.2 x 10*12/L)  11.5 .......HGB   (11.7-15.7 g/dL)  35.3 .......HCT   (35.0-47.0 %)  74.5 .......MCV   (78-10 0fL)  24.3 .......MCH   (26.5-33.0 pg)  32.6 .......MCHC   (31.5-36.5 g/dL)  16.4 .......RDW   (10.0-15.0 %)  277 .......PLT   (150-450 x 10*9/L)  2.4 .......Retic   (0.5-2.0%)    PERIPHERAL BLOOD DIFFERENTIAL  (Reference ranges  >18 year old female)    Percent  53....Neutrophils, segmented and bands   (40 - 75)  34.4....Lymphocytes   (20 - 48)  8.1....Monocytes   (0 - 12)  3.9....Eosinophils   (0 - 6)  0.6....Basophils   (0 - 2)    Absolute  2.88....Neutrophils,segmented and bands    (1.6 - 8.3 x 10*9/L)  1.87....Lymphocytes    (0.8 - 5.3 x 10*9/L)  0.44....Monocytes    (0 -1.3 x 10*9/L)  0.21....Eosinophils    (0 - 0.7 x 10*9/L)  0.03....Basophils    (0 - 0.2 x 10*9/L)    PERIPHERAL MORPHOLOGY:    ERYTHROCYTES: The red blood cells are normal in number but the  hemoglobin is borderline reduced.  The red cells are microcytic  normochromic by indices.  Anisopoikilocytosis is slight and nonspecific.   Infrequent target cells are identified.  Polychromasia is not  increased.  Rouleaux is n ot evident.    LEUKOCYTES: The white blood cells are normal in number with neutrophils  predominating.  No atypical or dysplastic forms are observed.    PLATELETS: The platelets are normal in number and morphology.    CPT Codes:  A: 25042-LFDA    TESTING LAB LOCATION:  [address]  [address]  [phone]    COLLECTION SITE:  Client:  [facility]  Location:  OXIM (S)     Protein electrophoresis   Result Value Ref Range    Albumin Fraction 4.0 3.7 - 5.1 g/dL    Alpha 1 Fraction 0.3 0.2 - 0.4 g/dL    Alpha 2 Fraction 0.7 0.5 - 0.9 g/dL    Beta Fraction 0.9 0.6 - 1.0 g/dL    Gamma Fraction 1.7 (H) 0.7 - 1.6 g/dL    Monoclonal Peak 0.0 0.0 g/dL    ELP Interpretation:       Essentially normal electrophoretic pattern.  No monoclonal protein seen.   Pathologic significance requires clinical correlation.  KOBI Finney M.D.,   Ph.D., Pathologist ().     UA with Microscopic reflex to Culture   Result Value Ref Range    Color Urine Yellow     Appearance Urine Clear     Glucose Urine Negative NEG mg/dL    Bilirubin Urine Negative NEG    Ketones Urine Negative NEG mg/dL    Specific Gravity  Urine 1.020 1.003 - 1.035    pH Urine 6.0 5.0 - 7.0 pH    Protein Albumin Urine Negative NEG mg/dL    Urobilinogen Urine 0.2 0.2 - 1.0 EU/dL    Nitrite Urine Negative NEG    Blood Urine Negative NEG    Leukocyte Esterase Urine Negative NEG    Source Midstream Urine     WBC Urine O - 2 0 - 2 /HPF    RBC Urine O - 2 0 - 2 /HPF    Squamous Epithelial /LPF Urine Moderate (A) FEW /LPF    Bacteria Urine Few (A) NEG /HPF   Vitamin B6   Result Value Ref Range    Vitamin B6 41.2    Vitamin D Deficiency   Result Value Ref Range    Vitamin D Deficiency screening 30 20 - 75 ug/L   Vitamin C   Result Value Ref Range    Vitamin C 49    Vitamin B12   Result Value Ref Range    Vitamin B12 1407 (H) 193 - 986 pg/mL   TSH   Result Value Ref Range    TSH 1.37 0.40 - 4.00 mU/L   T4 FREE   Result Value Ref Range    T4 Free 1.02 0.76 - 1.46 ng/dL   LDL cholesterol direct   Result Value Ref Range    LDL Cholesterol Direct 70 <100 mg/dL   Iron and iron binding capacity   Result Value Ref Range    Iron 126 35 - 180 ug/dL    Iron Binding Cap 295 240 - 430 ug/dL    Iron Saturation Index 43 15 - 46 %   Ferritin   Result Value Ref Range    Ferritin 10 (L) 12 - 150 ng/mL   Comprehensive metabolic panel   Result Value Ref Range    Sodium 139 133 - 144 mmol/L    Potassium 4.0 3.4 - 5.3 mmol/L    Chloride 106 94 - 109 mmol/L    Carbon Dioxide 25 20 - 32 mmol/L    Anion Gap 8 3 - 14 mmol/L    Glucose 89 70 - 99 mg/dL    Urea Nitrogen 14 7 - 30 mg/dL    Creatinine 0.71 0.52 - 1.04 mg/dL    GFR Estimate >90  Non  GFR Calc   >60 mL/min/1.7m2    GFR Estimate If Black >90   GFR Calc   >60 mL/min/1.7m2    Calcium 8.7 8.5 - 10.1 mg/dL    Bilirubin Total 0.6 0.2 - 1.3 mg/dL    Albumin 3.6 3.4 - 5.0 g/dL    Protein Total 7.7 6.8 - 8.8 g/dL    Alkaline Phosphatase 51 40 - 150 U/L    ALT 18 0 - 50 U/L    AST 14 0 - 45 U/L        ASSESSMENT/PLAN:                                                      DIAGNOSIS/PLAN:     ICD-10-CM     1. Vitamin D deficiency E55.9 cholecalciferol (VITAMIN D3) 09017 UNITS capsule   2. Chronic fatigue R53.82 cholecalciferol 5000 UNITS CAPS     cholecalciferol (VITAMIN D3) 16892 UNITS capsule     ferrous fumarate 65 mg, [place]. FE,-Vitamin C 125 mg (VITRON C)  MG TABS tablet   3. Iron deficiency anemia, unspecified iron deficiency anemia type D50.9 ferrous fumarate 65 mg, [place]. FE,-Vitamin C 125 mg (VITRON C)  MG TABS tablet     Prenatal Vit-Fe Fumarate-FA (PRENATAL LOW IRON) 27-1 MG TABS   4. Menorrhagia with regular cycle N92.0 Prenatal Vit-Fe Fumarate-FA (PRENATAL LOW IRON) 27-1 MG TABS       SIGNIFICANT ISSUES RE The primary encounter diagnosis was Vitamin D deficiency. Diagnoses of Chronic fatigue, Iron deficiency anemia, unspecified iron deficiency anemia type, and Menorrhagia with regular cycle were also pertinent to this visit. AS NOTED AND ADDRESSED ABOVE   MEDS AND AND LABS AS ORDERED TO ADDRESS PREVIOUS AND CURRENT ABNORMAL INDICES    Melissa's current symptoms are likely as a result of iron deficiency anemia from menstrual losses and moderate vitamin D deficiency. She is to take medications as ordered today to correct these parameters. Given the fact that her periods also have the I would recommend that she follow-up with GYN to discuss either oral contraceptive pills or Mirena IUD as a way of reducing menstrual flow so as to minimize menstrual iron losses.    SEE PATIENT INSTRUCTION SECTION FOR FOLLOW UP PLAN    Melissa IS TO CONTINUE OTHER TREATMENT REGIMEN/PLANS EXCEPT AS INDICATED    COMPLIANCE WITH MEDICATIONS DIET AND EXERCISE PLANS ENCOURAGED    DISCONTINUED MEDS:  Medications Discontinued During This Encounter   Medication Reason     cholecalciferol 5000 UNITS CAPS Reorder     ferrous fumarate 65 mg, [place]. FE,-Vitamin C 125 mg (VITRON C)  MG TABS tablet Reorder       CURRENT MED LIST WITH CHANGES AS NOTED BELOW:  Current Outpatient Prescriptions   Medication Sig Dispense  Refill     cholecalciferol 5000 UNITS CAPS Take 1 capsule (5,000 Units) by mouth daily INDICATION: LOW VITAMIN D, TAKE WITH FOOD 90 capsule 3     cholecalciferol (VITAMIN D3) 63690 UNITS capsule Take 1 capsule (50,000 Units) by mouth twice a week for 5 doses INDICATION: 2 WEEK BOOSTER DOSE TO CORRECT LOW VITAMIN D 5 capsule 0     ferrous fumarate 65 mg, [place]. FE,-Vitamin C 125 mg (VITRON C)  MG TABS tablet Take 1 tablet by mouth daily INDICATION: IRON SUPPLEMENT 100 tablet prn     Prenatal Vit-Fe Fumarate-FA (PRENATAL LOW IRON) 27-1 MG TABS Take 1 tablet by mouth daily INDICATION: VITAMIN SUPPLEMENTATION 90 tablet prn     Calcium Carb-Cholecalciferol (CALCIUM 1000 + D) 1000-800 MG-UNIT TABS Take 1 tablet by mouth daily TAKE WITH FOOD, FOR BONE HEALTH AND FOR VITAMIN D SUPPLEMENTATION 100 tablet PRN     cetirizine (ZYRTEC) 10 MG tablet TAKE ONE TABLET BY MOUTH EVERY DAY TO TREAT NASAL ALLERGIES 90 tablet 2     IBUPROFEN PO        fluticasone (FLONASE) 50 MCG/ACT nasal spray Spray 2 sprays in nostril At Bedtime INDICATION: TO CONTROL NASAL ALLERGY SYMPTOMS 16 g PRN           Patient Instructions   ** FOLLOW UP PLAN**:    PLEASE SCHEDULE LABS WITH OFFICE VISIT 2 MONTHS FROM TODAY TO FOLLOW UP ON      Chronic fatigue  Vitamin D deficiency  Iron deficiency anemia, unspecified iron deficiency anemia type  Menorrhagia with regular cycle     BE SURE TO SCHEDULE YOUR LAB DRAW APPOINTMENT FOR AT LEAST 5 DAYS BEFORE YOUR NEXT VISIT    YOU MAY CONTACT THE CLINIC IF ANY QUESTIONS OR CONCERNS -[phone] OR VIA [employer]                 Kaci Francisco MD  [facility]

## 2017-06-08 NOTE — PATIENT INSTRUCTIONS
** FOLLOW UP PLAN**:    PLEASE SCHEDULE LABS WITH OFFICE VISIT 2 MONTHS FROM TODAY TO FOLLOW UP ON      Chronic fatigue  Vitamin D deficiency  Iron deficiency anemia, unspecified iron deficiency anemia type  Menorrhagia with regular cycle     BE SURE TO SCHEDULE YOUR LAB DRAW APPOINTMENT FOR AT LEAST 5 DAYS BEFORE YOUR NEXT VISIT    YOU MAY CONTACT THE CLINIC IF ANY QUESTIONS OR CONCERNS -[phone] OR VIA [employer]

## 2017-07-18 ENCOUNTER — RADIANT APPOINTMENT (OUTPATIENT)
Dept: MAMMOGRAPHY | Facility: CLINIC | Age: 42
End: 2017-07-18
Attending: INTERNAL MEDICINE
Payer: COMMERCIAL

## 2017-07-18 DIAGNOSIS — Z12.31 VISIT FOR SCREENING MAMMOGRAM: ICD-10-CM

## 2017-07-18 PROCEDURE — 77063 BREAST TOMOSYNTHESIS BI: CPT | Mod: TC

## 2017-07-18 PROCEDURE — G0202 SCR MAMMO BI INCL CAD: HCPCS | Mod: TC

## 2017-08-07 ENCOUNTER — OFFICE VISIT (OUTPATIENT)
Dept: ORTHOPEDICS | Facility: CLINIC | Age: 42
End: 2017-08-07

## 2017-08-07 VITALS
BODY MASS INDEX: 35.68 KG/M2 | SYSTOLIC BLOOD PRESSURE: 118 MMHG | HEIGHT: 66 IN | WEIGHT: 222 LBS | DIASTOLIC BLOOD PRESSURE: 78 MMHG

## 2017-08-07 DIAGNOSIS — M54.50 ACUTE BILATERAL LOW BACK PAIN WITHOUT SCIATICA: Primary | ICD-10-CM

## 2017-08-07 PROCEDURE — 99214 OFFICE O/P EST MOD 30 MIN: CPT | Performed by: FAMILY MEDICINE

## 2017-08-07 ASSESSMENT — ENCOUNTER SYMPTOMS
SENSORY CHANGE: 0
MYALGIAS: 1
BACK PAIN: 1
FOCAL WEAKNESS: 0
TINGLING: 0

## 2017-08-07 NOTE — MR AVS SNAPSHOT
After Visit Summary   8/7/2017    Melissa Painter    MRN: [id number]           Patient Information     Date Of Birth          1975        Visit Information        Provider Department      8/7/2017 3:40 PM Lupillo Lenz MD [facility]        Today's Diagnoses     Acute bilateral low back pain without sciatica    -  1      Care Instructions      What Is Lumbar Epidural Injection?  A lumbar epidural injection, which contains a numbing medicine and a steroid, won t stop all low back and leg pain. But it can reduce pain and break the pain cycle. This cycle may begin when back pain makes it hard to move. Lack of movement can then slow down the healing process. By getting you back on your feet, the injection can help speed your recovery. Some people may feel more relief from an injection than others. And some people may need more than one injection to get relief. Usually, no more than 3 injections are done in a 12 month period. If the first injection did not help, it is less likely that another one will help.  A tool for diagnosis  An injection can help locate the source of pain. Also called a selective nerve block or a selective epidural, it numbs the roots of specific nerves. The effect lasts only briefly, but if you feel relief, it may indicate the source of the pain. If you feel no relief, it may mean that the pain s source is at another level in your spine. Or it may mean that something other than inflammation is causing the pain. Injection results also may be used to help plan back surgery, if needed.  Possible risks and complications  Here are some risks of lumbar epidural:    Spinal headache    Bleeding (rare)    Infection (rare)   Date Last Reviewed: 10/31/2015    4458-1628 [employer]. [address] [place] [address]. All rights reserved. This information is not intended as a substitute for professional medical  care. Always follow your healthcare professional's instructions.        Lumbar Epidural Injection: Your Procedure  A lumbar epidural injection is an outpatient procedure. It s often done in a hospital or an outpatient surgery center. Before your injection, your healthcare provider will discuss how you need to prepare.  Getting ready  You may need to prepare by doing the following:    Give the doctor a list of all medicines you take, including aspirin and anti-inflammatories. (You may need to stop taking some of them before the injection.)    You may be asked not to eat or drink anything for several hours before check-in.    Arrange for an adult friend or family member to drive you home afterward.    Bring any requested X-ray, CT, or MRI images on the day of the procedure.  During the procedure  The injection takes just a few minutes. But extra time is needed to get ready. You may be given medicine before the injection to help you relax:    In some cases, monitoring devices may be attached to your chest or side. These devices measure your heart rate, breathing, and blood pressure.    You lie on your stomach or side, depending on where the injection will be given. Your back is cleaned and may be covered with sterile towels.    Medicine is given to numb the skin near the injection site.    If X-ray imaging (fluoroscopy) is to be used, a contrast  dye  may be injected into your back. This helps your doctor get a better image.    A local anesthetic (for numbing), steroids (for reducing inflammation), or both are injected into the epidural space.  The procedure is very safe, but there is a very small risk of infection or local reaction afterward. If you have increasing pain, headaches (especially when standing up) redness, fever, or symptoms of infection, seek medical attention right away.   After the procedure  You ll spend time in a recovery area after the procedure. Before going home, you may be asked to fill out  another survey about your pain.  Date Last Reviewed: 11/1/2015 2000-2017 [employer]. [address] [place] [address], [place] [address]. All rights reserved. This information is not intended as a substitute for professional medical care. Always follow your healthcare professional's instructions.                Follow-ups after your visit        Your next 10 appointments already scheduled     Aug 14, 2017  8:15 AM CDT   LAB with [facility]   [facility]    [address] [place] [address] [id number] [phone]           Patient must bring picture ID. Patient should be prepared to give a urine specimen  Please do not eat 10-12 hours before your appointment if you are coming in fasting for labs on lipids, cholesterol, or glucose (sugar). Pregnant women should follow their Care Team instructions. Water with medications is okay. Do not drink coffee or other fluids. If you have concerns about taking  your medications, please ask at office or if scheduling via [facility], send a message by clicking on Secure Messaging, Message Your Care Team.            Aug 18, 2017 10:00 AM CDT   Office Visit with Kaci Francisco MD   [facility] ([facility]    [address] [place] [address] [id number] [phone]           Bring a current list of meds and any records pertaining to this visit. For Physicals, please bring immunization records and any forms needing to be filled out. Please arrive 10 minutes early to complete paperwork.              Future tests that were ordered for you today     Open Future Orders        Priority Expected Expires Ordered    MR Lumbar Spine w/o Contrast Routine  8/7/2018 8/7/2017            Who to contact     If you have questions or need follow up information about today's clinic visit or your schedule please contact [facility] directly at [phone].  Normal or non-critical lab and imaging results will be communicated to you by MyChart, letter or phone within 4 business days after the clinic has received the results. If you do not hear from us within 7 days, please contact the clinic through MyChart or phone. If you have a critical or abnormal lab result, we will notify you by phone as soon as possible.  Submit refill requests through [employer] or call your pharmacy and they will forward the refill request to us. Please allow 3 business days for your refill to be completed.          Additional Information About Your Visit        [employer] Information     [employer] gives you secure access to your electronic health record. If you see a primary care provider, you can also send messages to your care team and make appointments. If you have questions, please call your primary care clinic.  If you do not have a primary care provider, please call [phone] and they will assist you.        Care EveryWhere ID     This is your Care EveryWhere ID. This could be used by other organizations to access your [facility] records  [id number]        Your Vitals Were     Height BMI (Body Mass Index)                5' 6\" (1.676 m) 35.83 kg/m2           Blood Pressure from Last 3 Encounters:   08/07/17 118/78   06/08/17 120/78   06/01/17 110/82    Weight from Last 3 Encounters:   08/07/17 222 lb (100.7 kg)   06/08/17 222 lb 4.8 oz (100.8 kg)   06/01/17 224 lb (101.6 kg)               Primary Care Provider Office Phone # Fax #    Kaci Francisco [phone]       [facility] [address] [facility] [address]        Equal Access to Services     [facility]: Hadii raleigh guan Solakia, waaxda luqadaha, qaybta kaalmada nicho, prakash noguera. So [facility] [phone].    ATENCIÓN: Si habla español, tiene a edwards disposición servicios gratuitos de asistencia lingüística. Llame al " [phone].    We comply with applicable federal civil rights laws and [place] laws. We do not discriminate on the basis of race, color, national origin, age, disability sex, sexual orientation or gender identity.            Thank you!     Thank you for choosing [facility]  for your care. Our goal is always to provide you with excellent care. Hearing back from our patients is one way we can continue to improve our services. Please take a few minutes to complete the written survey that you may receive in the mail after your visit with us. Thank you!             Your Updated Medication List - Protect others around you: Learn how to safely use, store and throw away your medicines at www.disposemymeds.org.          This list is accurate as of: 8/7/17  3:55 PM.  Always use your most recent med list.                   Brand Name Dispense Instructions for use Diagnosis    Calcium Carb-Cholecalciferol 1000-800 MG-UNIT Tabs    CALCIUM 1000 + D    100 tablet    Take 1 tablet by mouth daily TAKE WITH FOOD, FOR BONE HEALTH AND FOR VITAMIN D SUPPLEMENTATION    Chronic fatigue, Vitamin D deficiency       cetirizine 10 MG tablet    zyrTEC    90 tablet    TAKE ONE TABLET BY MOUTH EVERY DAY TO TREAT NASAL ALLERGIES    Seasonal allergic rhinitis       cholecalciferol 5000 UNITS Caps     90 capsule    Take 1 capsule (5,000 Units) by mouth daily INDICATION: LOW VITAMIN D, TAKE WITH FOOD    Chronic fatigue       ferrous fumarate 65 mg ([place]. FE)-Vitamin C 125 mg  MG Tabs tablet    VITRON C    100 tablet    Take 1 tablet by mouth daily INDICATION: IRON SUPPLEMENT    Chronic fatigue, Iron deficiency anemia, unspecified iron deficiency anemia type       fluticasone 50 MCG/ACT spray    FLONASE    16 g    Spray 2 sprays in nostril At Bedtime INDICATION: TO CONTROL NASAL ALLERGY SYMPTOMS    Seasonal allergic rhinitis       IBUPROFEN PO           PRENATAL LOW IRON 27-1 MG Tabs     90  tablet    Take 1 tablet by mouth daily INDICATION: VITAMIN SUPPLEMENTATION    Iron deficiency anemia, unspecified iron deficiency anemia type, Menorrhagia with regular cycle

## 2017-08-07 NOTE — NURSING NOTE
"Chief Complaint   Patient presents with     Back Pain       Initial /78  Ht 5' 6\" (1.676 m)  Wt 222 lb (100.7 kg)  BMI 35.83 kg/m2 Estimated body mass index is 35.83 kg/(m^2) as calculated from the following:    Height as of this encounter: 5' 6\" (1.676 m).    Weight as of this encounter: 222 lb (100.7 kg).  Medication Reconciliation: complete    "

## 2017-08-07 NOTE — Clinical Note
Here's an update on your patient, Melissa Painter. Thank you for allowing me at [facility] to be involved in the care of your patient. Please feel free to reach out to me on [employer], Epic Staff Message, or by phone at [phone].   Lupillo Lenz MD [facility] [address] , [address] [place] [address] Phone: [phone] Fax: [phone]

## 2017-08-07 NOTE — PATIENT INSTRUCTIONS
What Is Lumbar Epidural Injection?  A lumbar epidural injection, which contains a numbing medicine and a steroid, won t stop all low back and leg pain. But it can reduce pain and break the pain cycle. This cycle may begin when back pain makes it hard to move. Lack of movement can then slow down the healing process. By getting you back on your feet, the injection can help speed your recovery. Some people may feel more relief from an injection than others. And some people may need more than one injection to get relief. Usually, no more than 3 injections are done in a 12 month period. If the first injection did not help, it is less likely that another one will help.  A tool for diagnosis  An injection can help locate the source of pain. Also called a selective nerve block or a selective epidural, it numbs the roots of specific nerves. The effect lasts only briefly, but if you feel relief, it may indicate the source of the pain. If you feel no relief, it may mean that the pain s source is at another level in your spine. Or it may mean that something other than inflammation is causing the pain. Injection results also may be used to help plan back surgery, if needed.  Possible risks and complications  Here are some risks of lumbar epidural:    Spinal headache    Bleeding (rare)    Infection (rare)   Date Last Reviewed: 10/31/2015    [id number] [employer]. [address] [place] [address], [place] [id number]. All rights reserved. This information is not intended as a substitute for professional medical care. Always follow your healthcare professional's instructions.        Lumbar Epidural Injection: Your Procedure  A lumbar epidural injection is an outpatient procedure. It s often done in a hospital or an outpatient surgery center. Before your injection, your healthcare provider will discuss how you need to prepare.  Getting ready  You may need to prepare by doing the following:    Give the doctor a list of all  medicines you take, including aspirin and anti-inflammatories. (You may need to stop taking some of them before the injection.)    You may be asked not to eat or drink anything for several hours before check-in.    Arrange for an adult friend or family member to drive you home afterward.    Bring any requested X-ray, CT, or MRI images on the day of the procedure.  During the procedure  The injection takes just a few minutes. But extra time is needed to get ready. You may be given medicine before the injection to help you relax:    In some cases, monitoring devices may be attached to your chest or side. These devices measure your heart rate, breathing, and blood pressure.    You lie on your stomach or side, depending on where the injection will be given. Your back is cleaned and may be covered with sterile towels.    Medicine is given to numb the skin near the injection site.    If X-ray imaging (fluoroscopy) is to be used, a contrast  dye  may be injected into your back. This helps your doctor get a better image.    A local anesthetic (for numbing), steroids (for reducing inflammation), or both are injected into the epidural space.  The procedure is very safe, but there is a very small risk of infection or local reaction afterward. If you have increasing pain, headaches (especially when standing up) redness, fever, or symptoms of infection, seek medical attention right away.   After the procedure  You ll spend time in a recovery area after the procedure. Before going home, you may be asked to fill out another survey about your pain.  Date Last Reviewed: 11/1/2015 2000-2017 [employer]. [address] [place] [address], [place] [address]. All rights reserved. This information is not intended as a substitute for professional medical care. Always follow your healthcare professional's instructions.

## 2017-08-11 ENCOUNTER — HOSPITAL ENCOUNTER (OUTPATIENT)
Dept: MRI IMAGING | Facility: CLINIC | Age: 42
Discharge: HOME | End: 2017-08-11
Attending: FAMILY MEDICINE | Admitting: FAMILY MEDICINE
Payer: OTHER GOVERNMENT

## 2017-08-11 DIAGNOSIS — M54.50 ACUTE BILATERAL LOW BACK PAIN WITHOUT SCIATICA: ICD-10-CM

## 2017-08-11 PROCEDURE — 72148 MRI LUMBAR SPINE W/O DYE: CPT

## 2017-08-14 DIAGNOSIS — E55.9 VITAMIN D DEFICIENCY: ICD-10-CM

## 2017-08-14 DIAGNOSIS — N92.0 MENORRHAGIA WITH REGULAR CYCLE: ICD-10-CM

## 2017-08-14 DIAGNOSIS — D50.9 IRON DEFICIENCY ANEMIA, UNSPECIFIED IRON DEFICIENCY ANEMIA TYPE: ICD-10-CM

## 2017-08-14 LAB
ERYTHROCYTE [DISTWIDTH] IN BLOOD BY AUTOMATED COUNT: 16.6 % (ref 10–15)
FERRITIN SERPL-MCNC: 18 NG/ML (ref 12–150)
HCT VFR BLD AUTO: 35.8 % (ref 35–47)
HGB BLD-MCNC: 11.8 G/DL (ref 11.7–15.7)
IRON SATN MFR SERPL: 17 % (ref 15–46)
IRON SERPL-MCNC: 43 UG/DL (ref 35–180)
MCH RBC QN AUTO: 23.9 PG (ref 26.5–33)
MCHC RBC AUTO-ENTMCNC: 33 G/DL (ref 31.5–36.5)
MCV RBC AUTO: 73 FL (ref 78–100)
PLATELET # BLD AUTO: 278 10E9/L (ref 150–450)
RBC # BLD AUTO: 4.93 10E12/L (ref 3.8–5.2)
TIBC SERPL-MCNC: 258 UG/DL (ref 240–430)
WBC # BLD AUTO: 4.8 10E9/L (ref 4–11)

## 2017-08-14 PROCEDURE — 83540 ASSAY OF IRON: CPT | Performed by: INTERNAL MEDICINE

## 2017-08-14 PROCEDURE — 83550 IRON BINDING TEST: CPT | Performed by: INTERNAL MEDICINE

## 2017-08-14 PROCEDURE — 85027 COMPLETE CBC AUTOMATED: CPT | Performed by: INTERNAL MEDICINE

## 2017-08-14 PROCEDURE — 82728 ASSAY OF FERRITIN: CPT | Performed by: INTERNAL MEDICINE

## 2017-08-14 PROCEDURE — 36415 COLL VENOUS BLD VENIPUNCTURE: CPT | Performed by: INTERNAL MEDICINE

## 2017-08-14 PROCEDURE — 82306 VITAMIN D 25 HYDROXY: CPT | Performed by: INTERNAL MEDICINE

## 2017-08-15 LAB — DEPRECATED CALCIDIOL+CALCIFEROL SERPL-MC: 65 UG/L (ref 20–75)

## 2017-08-17 ENCOUNTER — OFFICE VISIT (OUTPATIENT)
Dept: ORTHOPEDICS | Facility: CLINIC | Age: 42
End: 2017-08-17
Payer: OTHER GOVERNMENT

## 2017-08-17 VITALS
HEIGHT: 66 IN | SYSTOLIC BLOOD PRESSURE: 112 MMHG | BODY MASS INDEX: 35.68 KG/M2 | WEIGHT: 222 LBS | DIASTOLIC BLOOD PRESSURE: 80 MMHG

## 2017-08-17 DIAGNOSIS — M51.369 DDD (DEGENERATIVE DISC DISEASE), LUMBAR: ICD-10-CM

## 2017-08-17 DIAGNOSIS — M54.50 ACUTE BILATERAL LOW BACK PAIN WITHOUT SCIATICA: Primary | ICD-10-CM

## 2017-08-17 PROCEDURE — 99213 OFFICE O/P EST LOW 20 MIN: CPT | Performed by: FAMILY MEDICINE

## 2017-08-17 ASSESSMENT — ENCOUNTER SYMPTOMS
TINGLING: 0
MYALGIAS: 1
FOCAL WEAKNESS: 0
BACK PAIN: 1
SENSORY CHANGE: 0
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
## 2017-08-17 NOTE — NURSING NOTE
"Chief Complaint   Patient presents with     Back Pain       Initial /80  Ht 5' 6\" (1.676 m)  Wt 222 lb (100.7 kg)  BMI 35.83 kg/m2 Estimated body mass index is 35.83 kg/(m^2) as calculated from the following:    Height as of this encounter: 5' 6\" (1.676 m).    Weight as of this encounter: 222 lb (100.7 kg).  Medication Reconciliation: complete    "

## 2017-08-17 NOTE — MR AVS SNAPSHOT
After Visit Summary   8/17/2017    Melissa Painter    MRN: [id number]           Patient Information     Date Of Birth          1975        Visit Information        Provider Department      8/17/2017 4:20 PM Lupillo Lenz MD [facility]        Today's Diagnoses     Acute bilateral low back pain without sciatica    -  1    DDD (degenerative disc disease), lumbar           Follow-ups after your visit        Your next 10 appointments already scheduled     Aug 18, 2017 10:00 AM CDT   Office Visit with Kaci Francisco MD   [facility]    [address] [address] [phone]           Bring a current list of meds and any records pertaining to this visit. For Physicals, please bring immunization records and any forms needing to be filled out. Please arrive 10 minutes early to complete paperwork.              Future tests that were ordered for you today     Open Future Orders        Priority Expected Expires Ordered    XR Translaminar Epidural Lumbar Inj Incl Imaging Routine 8/17/2017 8/17/2018 8/17/2017            Who to contact     If you have questions or need follow up information about today's clinic visit or your schedule please contact [facility] directly at [phone].  Normal or non-critical lab and imaging results will be communicated to you by MyChart, letter or phone within 4 business days after the clinic has received the results. If you do not hear from us within 7 days, please contact the clinic through MyChart or phone. If you have a critical or abnormal lab result, we will notify you by phone as soon as possible.  Submit refill requests through [facility] or call your pharmacy and they will forward the refill request to us. Please allow 3 business days for your refill to be completed.           "Additional Information About Your Visit        MyChart Information     [employer] gives you secure access to your electronic health record. If you see a primary care provider, you can also send messages to your care team and make appointments. If you have questions, please call your primary care clinic.  If you do not have a primary care provider, please call [phone] and they will assist you.        Care EveryWhere ID     This is your Care EveryWhere ID. This could be used by other organizations to access your [place] medical records  [id number]        Your Vitals Were     Height BMI (Body Mass Index)                5' 6\" (1.676 m) 35.83 kg/m2           Blood Pressure from Last 3 Encounters:   08/17/17 112/80   08/07/17 118/78   06/08/17 120/78    Weight from Last 3 Encounters:   08/17/17 222 lb (100.7 kg)   08/07/17 222 lb (100.7 kg)   06/08/17 222 lb 4.8 oz (100.8 kg)               Primary Care Provider Office Phone # Fax #    Kaci Francisco [phone]       [address] [facility] [address]        Equal Access to Services     BERT THIBODEAUX : Hadii aad ku hadasho Sorubiali, waaxda luqadaha, qaybta kaalmada aderogelioyada, prakash hamiltonn tamar noguera. So [facility] [phone].    ATENCIÓN: Si habla español, tiene a edwards disposición servicios gratuitos de asistencia lingüística. [facility] [phone].    We comply with applicable federal civil rights laws and [place] laws. We do not discriminate on the basis of race, color, national origin, age, disability sex, sexual orientation or gender identity.            Thank you!     Thank you for choosing [facility]  for your care. Our goal is always to provide you with excellent care. Hearing back from our patients is one way we can continue to improve our services. Please take a few minutes to complete the written survey that you may receive in the mail after your visit with us. Thank you!      "        Your Updated Medication List - Protect others around you: Learn how to safely use, store and throw away your medicines at www.disposemymeds.org.          This list is accurate as of: 8/17/17  4:47 PM.  Always use your most recent med list.                   Brand Name Dispense Instructions for use Diagnosis    Calcium Carb-Cholecalciferol 1000-800 MG-UNIT Tabs    CALCIUM 1000 + D    100 tablet    Take 1 tablet by mouth daily TAKE WITH FOOD, FOR BONE HEALTH AND FOR VITAMIN D SUPPLEMENTATION    Chronic fatigue, Vitamin D deficiency       cetirizine 10 MG tablet    zyrTEC    90 tablet    TAKE ONE TABLET BY MOUTH EVERY DAY TO TREAT NASAL ALLERGIES    Seasonal allergic rhinitis       cholecalciferol 5000 UNITS Caps     90 capsule    Take 1 capsule (5,000 Units) by mouth daily INDICATION: LOW VITAMIN D, TAKE WITH FOOD    Chronic fatigue       ferrous fumarate 65 mg ([place]. FE)-Vitamin C 125 mg  MG Tabs tablet    VITRON C    100 tablet    Take 1 tablet by mouth daily INDICATION: IRON SUPPLEMENT    Chronic fatigue, Iron deficiency anemia, unspecified iron deficiency anemia type       fluticasone 50 MCG/ACT spray    FLONASE    16 g    Spray 2 sprays in nostril At Bedtime INDICATION: TO CONTROL NASAL ALLERGY SYMPTOMS    Seasonal allergic rhinitis       IBUPROFEN PO           PRENATAL LOW IRON 27-1 MG Tabs     90 tablet    Take 1 tablet by mouth daily INDICATION: VITAMIN SUPPLEMENTATION    Iron deficiency anemia, unspecified iron deficiency anemia type, Menorrhagia with regular cycle

## 2017-08-18 ENCOUNTER — OFFICE VISIT (OUTPATIENT)
Dept: INTERNAL MEDICINE | Facility: CLINIC | Age: 42
End: 2017-08-18
Payer: OTHER GOVERNMENT

## 2017-08-18 VITALS
WEIGHT: 210 LBS | TEMPERATURE: 98.5 F | HEART RATE: 87 BPM | DIASTOLIC BLOOD PRESSURE: 76 MMHG | SYSTOLIC BLOOD PRESSURE: 110 MMHG | BODY MASS INDEX: 33.89 KG/M2 | OXYGEN SATURATION: 99 %

## 2017-08-18 DIAGNOSIS — N92.0 MENORRHAGIA WITH REGULAR CYCLE: ICD-10-CM

## 2017-08-18 DIAGNOSIS — R71.8 LOW MEAN CORPUSCULAR VOLUME: Primary | ICD-10-CM

## 2017-08-18 DIAGNOSIS — E55.9 VITAMIN D DEFICIENCY: ICD-10-CM

## 2017-08-18 DIAGNOSIS — D50.9 IRON DEFICIENCY ANEMIA, UNSPECIFIED IRON DEFICIENCY ANEMIA TYPE: ICD-10-CM

## 2017-08-18 DIAGNOSIS — R53.82 CHRONIC FATIGUE: ICD-10-CM

## 2017-08-18 PROCEDURE — 99214 OFFICE O/P EST MOD 30 MIN: CPT | Performed by: INTERNAL MEDICINE

## 2017-08-18 NOTE — NURSING NOTE
"Chief Complaint   Patient presents with     Follow Up For     labs       Initial /76  Pulse 87  Temp 98.5  F (36.9  C) (Oral)  Wt 210 lb (95.3 kg)  LMP 08/05/2017 (Within Days)  SpO2 99%  BMI 33.89 kg/m2 Estimated body mass index is 33.89 kg/(m^2) as calculated from the following:    Height as of 8/17/17: 5' 6\" (1.676 m).    Weight as of this encounter: 210 lb (95.3 kg).  Medication Reconciliation: complete  "

## 2017-08-18 NOTE — PROGRESS NOTES
SUBJECTIVE:   Melissa Painter is a 42 year old female who presents to clinic today for the following health issues:        Follow up for lab results      Amount of exercise or physical activity: 4-5 days/week for an average of 45-60 minutes    Problems taking medications regularly: No    Medication side effects: none    Diet: regular (no restrictions)    She reports that she has felt better with regards to fatigue and muscle aches and pains since her last office visit. Melissa reports feeling really good.    Other significant issues as outlined and addressed in the plan section of this note.      Problem list and histories reviewed & adjusted, as indicated.    She was seen for low back pain that was unresponsive to medical treatment and had an MRI done which showed bulging disks in L4/5, and L5/S1. Epidural injection was recommended and she is in the process of setting that up.    Additional history: as documented    Labs reviewed in EPIC    Reviewed and updated as needed this visit by clinical staffTobacco  Allergies       Reviewed and updated as needed this visit by Provider         ROS:  14 point ROS negative except as above    OBJECTIVE:     /76  Pulse 87  Temp 98.5  F (36.9  C) (Oral)  Wt 210 lb (95.3 kg)  LMP 08/05/2017 (Within Days)  SpO2 99%  BMI 33.89 kg/m2  Body mass index is 33.89 kg/(m^2).  GENERAL: healthy, alert and no distress  NECK: no adenopathy, no asymmetry, masses, or scars and thyroid normal to palpation  RESP: lungs clear to auscultation - no rales, rhonchi or wheezes  CV: regular rate and rhythm, normal S1 S2, no S3 or S4, no murmur, click or rub, no peripheral edema and peripheral pulses strong  MS: no gross musculoskeletal defects noted, no edema    Diagnostic Test Results:  Results for orders placed or performed in visit on 08/14/17   Vitamin D Deficiency   Result Value Ref Range    Vitamin D Deficiency screening 65 20 - 75 ug/L   CBC with platelets   Result Value Ref Range     WBC 4.8 4.0 - 11.0 10e9/L    RBC Count 4.93 3.8 - 5.2 10e12/L    Hemoglobin 11.8 11.7 - 15.7 g/dL    Hematocrit 35.8 35.0 - 47.0 %    MCV 73 (L) 78 - 100 fl    MCH 23.9 (L) 26.5 - 33.0 pg    MCHC 33.0 31.5 - 36.5 g/dL    RDW 16.6 (H) 10.0 - 15.0 %    Platelet Count 278 150 - 450 10e9/L   Ferritin   Result Value Ref Range    Ferritin 18 12 - 150 ng/mL   Iron and iron binding capacity   Result Value Ref Range    Iron 43 35 - 180 ug/dL    Iron Binding Cap 258 240 - 430 ug/dL    Iron Saturation Index 17 15 - 46 %       ASSESSMENT/PLAN:     DIAGNOSIS/PLAN:     ICD-10-CM    1. Low mean corpuscular volume R71.8 CBC with platelets differential     Reticulocyte Count     Blood Morphology Pathologist Review   2. Iron deficiency anemia, unspecified iron deficiency anemia type D50.9 Prenatal Vit-Fe Fumarate-FA (PRENATAL LOW IRON) 27-1 MG TABS     ferrous fumarate 65 mg, [place]. FE,-Vitamin C 125 mg (VITRON C)  MG TABS tablet     Ferritin   3. Menorrhagia with regular cycle N92.0 Prenatal Vit-Fe Fumarate-FA (PRENATAL LOW IRON) 27-1 MG TABS   4. Chronic fatigue R53.82 ferrous fumarate 65 mg, [place]. FE,-Vitamin C 125 mg (VITRON C)  MG TABS tablet     cholecalciferol 5000 UNITS CAPS     Calcium Carb-Cholecalciferol (CALCIUM 1000 + D) 1000-800 MG-UNIT TABS     Ferritin     CBC with platelets differential     Reticulocyte Count     Blood Morphology Pathologist Review   5. Vitamin D deficiency E55.9 Calcium Carb-Cholecalciferol (CALCIUM 1000 + D) 1000-800 MG-UNIT TABS       SIGNIFICANT ISSUES RE The primary encounter diagnosis was Low mean corpuscular volume. Diagnoses of Iron deficiency anemia, unspecified iron deficiency anemia type, Menorrhagia with regular cycle, Chronic fatigue, and Vitamin D deficiency were also pertinent to this visit. AS NOTED AND ADDRESSED ABOVE   MEDS AND AND LABS AS ORDERED TO ADDRESS PREVIOUS AND CURRENT ABNORMAL INDICES    SEE PATIENT INSTRUCTION SECTION FOR FOLLOW UP PLAN    Melissa HUGHES TO  CONTINUE OTHER TREATMENT REGIMEN/PLANS EXCEPT AS INDICATED    COMPLIANCE WITH MEDICATIONS DIET AND EXERCISE PLANS ENCOURAGED    DISCONTINUED MEDS:  Medications Discontinued During This Encounter   Medication Reason     Prenatal Vit-Fe Fumarate-FA (PRENATAL LOW IRON) 27-1 MG TABS Reorder     ferrous fumarate 65 mg, [place]. FE,-Vitamin C 125 mg (VITRON C)  MG TABS tablet Reorder     cholecalciferol 5000 UNITS CAPS Reorder     Calcium Carb-Cholecalciferol (CALCIUM 1000 + D) 1000-800 MG-UNIT TABS Reorder       CURRENT MED LIST WITH CHANGES AS NOTED BELOW:  Current Outpatient Prescriptions   Medication Sig Dispense Refill     Prenatal Vit-Fe Fumarate-FA (PRENATAL LOW IRON) 27-1 MG TABS Take 1 tablet by mouth daily INDICATION: VITAMIN SUPPLEMENTATION 90 tablet prn     ferrous fumarate 65 mg, [place]. FE,-Vitamin C 125 mg (VITRON C)  MG TABS tablet Take 1 tablet by mouth every morning (before breakfast) INDICATION: IRON SUPPLEMENT 100 tablet prn     cholecalciferol 5000 UNITS CAPS Take 1 capsule (5,000 Units) by mouth daily INDICATION: LOW VITAMIN D, TAKE WITH FOOD 90 capsule 3     Calcium Carb-Cholecalciferol (CALCIUM 1000 + D) 1000-800 MG-UNIT TABS Take 1 tablet by mouth daily TAKE WITH FOOD, FOR BONE HEALTH AND FOR VITAMIN D SUPPLEMENTATION 100 tablet PRN     cetirizine (ZYRTEC) 10 MG tablet TAKE ONE TABLET BY MOUTH EVERY DAY TO TREAT NASAL ALLERGIES 90 tablet 2     IBUPROFEN PO        fluticasone (FLONASE) 50 MCG/ACT nasal spray Spray 2 sprays in nostril At Bedtime INDICATION: TO CONTROL NASAL ALLERGY SYMPTOMS 16 g PRN           Patient Instructions   ** FOLLOW UP PLAN**:    PLEASE SCHEDULE LABS WITH E-VISIT 2 MONTHS FROM TODAY TO FOLLOW UP ON  Anemia, iron deficiency, and to review your test results     TO INITIATE AN e-VISIT PLEASE GO UNDER THE SECURE MESSAGING TAB IN [employer] AND CLICK ON REQUEST e-VISIT TAB    BE SURE TO SCHEDULE YOUR LAB DRAW APPOINTMENT FOR AT LEAST 1 WEEK BEFORE YOUR E-VISIT    Please make  sure to continue to take your medications regularly **    YOU MAY CONTACT THE CLINIC IF ANY QUESTIONS OR CONCERNS -[phone] OR VIA AVIVA Francisco MD  [facility]

## 2017-08-18 NOTE — PATIENT INSTRUCTIONS
** FOLLOW UP PLAN**:    PLEASE SCHEDULE LABS WITH E-VISIT 2 MONTHS FROM TODAY TO FOLLOW UP ON  Anemia, iron deficiency, and to review your test results     TO INITIATE AN e-VISIT PLEASE GO UNDER THE SECURE MESSAGING TAB IN [employer] AND CLICK ON REQUEST e-VISIT TAB    BE SURE TO SCHEDULE YOUR LAB DRAW APPOINTMENT FOR AT LEAST 1 WEEK BEFORE YOUR E-VISIT    Please make sure to continue to take your medications regularly **    YOU MAY CONTACT THE CLINIC IF ANY QUESTIONS OR CONCERNS -[phone] OR VIA [employer]

## 2017-08-18 NOTE — MR AVS SNAPSHOT
After Visit Summary   8/18/2017    Melissa Painter    MRN: [id number]           Patient Information     Date Of Birth          1975        Visit Information        Provider Department      8/18/2017 10:00 AM Kaci Francisco MD [facility]        Today's Diagnoses     Low mean corpuscular volume    -  1    Iron deficiency anemia, unspecified iron deficiency anemia type        Menorrhagia with regular cycle        Chronic fatigue        Vitamin D deficiency          Care Instructions    ** FOLLOW UP PLAN**:    PLEASE SCHEDULE LABS WITH E-VISIT 2 MONTHS FROM TODAY TO FOLLOW UP ON  Anemia, iron deficiency, and to review your test results     TO INITIATE AN e-VISIT PLEASE GO UNDER THE SECURE MESSAGING TAB IN [employer] AND CLICK ON REQUEST e-VISIT TAB    BE SURE TO SCHEDULE YOUR LAB DRAW APPOINTMENT FOR AT LEAST 1 WEEK BEFORE YOUR E-VISIT    Please make sure to continue to take your medications regularly **    YOU MAY CONTACT THE CLINIC IF ANY QUESTIONS OR CONCERNS -[phone] OR VIA [employer]          Follow-ups after your visit        Future tests that were ordered for you today     Open Future Orders        Priority Expected Expires Ordered    Ferritin Routine 8/18/2017 4/18/2018 8/18/2017    CBC with platelets differential Routine 8/18/2017 4/18/2018 8/18/2017    Reticulocyte Count Routine 8/18/2017 4/18/2018 8/18/2017    Blood Morphology Pathologist Review Routine 8/18/2017 4/18/2018 8/18/2017    XR Translaminar Epidural Lumbar Inj Incl Imaging Routine 8/17/2017 8/17/2018 8/17/2017            Who to contact     If you have questions or need follow up information about today's clinic visit or your schedule please contact [facility] directly at [phone].  Normal or non-critical lab and imaging results will be communicated to you by MyChart, letter or phone within 4 business days after the clinic has received the results. If you do not hear from  us within 7 days, please contact the clinic through [facility] or phone. If you have a critical or abnormal lab result, we will notify you by phone as soon as possible.  Submit refill requests through [facility] or call your pharmacy and they will forward the refill request to us. Please allow 3 business days for your refill to be completed.          Additional Information About Your Visit        [employer] Information     [facility] gives you secure access to your electronic health record. If you see a primary care provider, you can also send messages to your care team and make appointments. If you have questions, please call your primary care clinic.  If you do not have a primary care provider, please call [phone] and they will assist you.        Care EveryWhere ID     This is your Care EveryWhere ID. This could be used by other organizations to access your [place] medical records  [id number]        Your Vitals Were     Pulse Temperature Last Period Pulse Oximetry BMI (Body Mass Index)       87 98.5  F (36.9  C) (Oral) 08/05/2017 (Within Days) 99% 33.89 kg/m2        Blood Pressure from Last 3 Encounters:   08/18/17 110/76   08/17/17 112/80   08/07/17 118/78    Weight from Last 3 Encounters:   08/18/17 210 lb (95.3 kg)   08/17/17 222 lb (100.7 kg)   08/07/17 222 lb (100.7 kg)                 Today's Medication Changes          These changes are accurate as of: 8/18/17 10:39 AM.  If you have any questions, ask your nurse or doctor.               These medicines have changed or have updated prescriptions.        Dose/Directions    ferrous fumarate 65 mg ([place]. FE)-Vitamin C 125 mg  MG Tabs tablet   Commonly known as:  VITRON C   This may have changed:  when to take this   Used for:  Chronic fatigue, Iron deficiency anemia, unspecified iron deficiency anemia type   Changed by:  Kaci Francisco MD        Dose:  1 tablet   Take 1 tablet by mouth every morning (before breakfast) INDICATION: IRON SUPPLEMENT    Quantity:  100 tablet   Refills:  prn            Where to get your medicines      These medications were sent to [place], [place] - [address]  [address], [facility] [address]     Phone:  [phone]     Calcium Carb-Cholecalciferol 1000-800 MG-UNIT Tabs    cholecalciferol 5000 UNITS Caps    ferrous fumarate 65 mg ([place]. FE)-Vitamin C 125 mg  MG Tabs tablet    PRENATAL LOW IRON 27-1 MG Tabs                Primary Care Provider Office Phone # Fax #    Kaci Francisco [phone]       [address]  [facility] [address]        Equal Access to Services     PENNY THIBODEAUX : Dat Viera, waricardo coleman, qaybta kaalmada nicho, prakash noguera. So [facility] [phone].    ATENCIÓN: Si habla español, tiene a edwards disposición servicios gratuitos de asistencia lingüística. Llame al [phone].    We comply with applicable federal civil rights laws and [place] laws. We do not discriminate on the basis of race, color, national origin, age, disability sex, sexual orientation or gender identity.            Thank you!     Thank you for choosing [facility]  for your care. Our goal is always to provide you with excellent care. Hearing back from our patients is one way we can continue to improve our services. Please take a few minutes to complete the written survey that you may receive in the mail after your visit with us. Thank you!             Your Updated Medication List - Protect others around you: Learn how to safely use, store and throw away your medicines at www.disposemymeds.org.          This list is accurate as of: 8/18/17 10:39 AM.  Always use your most recent med list.                   Brand Name Dispense Instructions for use Diagnosis    Calcium Carb-Cholecalciferol 1000-800 MG-UNIT Tabs    CALCIUM 1000 + D    100 tablet    Take 1 tablet by mouth daily TAKE WITH FOOD, FOR BONE  HEALTH AND FOR VITAMIN D SUPPLEMENTATION    Chronic fatigue, Vitamin D deficiency       cetirizine 10 MG tablet    zyrTEC    90 tablet    TAKE ONE TABLET BY MOUTH EVERY DAY TO TREAT NASAL ALLERGIES    Seasonal allergic rhinitis       cholecalciferol 5000 UNITS Caps     90 capsule    Take 1 capsule (5,000 Units) by mouth daily INDICATION: LOW VITAMIN D, TAKE WITH FOOD    Chronic fatigue       ferrous fumarate 65 mg ([place]. FE)-Vitamin C 125 mg  MG Tabs tablet    VITRON C    100 tablet    Take 1 tablet by mouth every morning (before breakfast) INDICATION: IRON SUPPLEMENT    Chronic fatigue, Iron deficiency anemia, unspecified iron deficiency anemia type       fluticasone 50 MCG/ACT spray    FLONASE    16 g    Spray 2 sprays in nostril At Bedtime INDICATION: TO CONTROL NASAL ALLERGY SYMPTOMS    Seasonal allergic rhinitis       IBUPROFEN PO           PRENATAL LOW IRON 27-1 MG Tabs     90 tablet    Take 1 tablet by mouth daily INDICATION: VITAMIN SUPPLEMENTATION    Iron deficiency anemia, unspecified iron deficiency anemia type, Menorrhagia with regular cycle

## 2017-08-25 ENCOUNTER — HOSPITAL ENCOUNTER (OUTPATIENT)
Facility: CLINIC | Age: 42
Discharge: HOME | End: 2017-08-25
Admitting: FAMILY MEDICINE
Payer: OTHER GOVERNMENT

## 2017-08-25 ENCOUNTER — HOSPITAL ENCOUNTER (OUTPATIENT)
Dept: GENERAL RADIOLOGY | Facility: CLINIC | Age: 42
End: 2017-08-25
Attending: FAMILY MEDICINE | Admitting: COLON & RECTAL SURGERY
Payer: OTHER GOVERNMENT

## 2017-08-25 VITALS
RESPIRATION RATE: 16 BRPM | OXYGEN SATURATION: 98 % | WEIGHT: 203 LBS | SYSTOLIC BLOOD PRESSURE: 110 MMHG | DIASTOLIC BLOOD PRESSURE: 82 MMHG | BODY MASS INDEX: 32.62 KG/M2 | HEIGHT: 66 IN | HEART RATE: 82 BPM | TEMPERATURE: 97.9 F

## 2017-08-25 DIAGNOSIS — M51.369 DDD (DEGENERATIVE DISC DISEASE), LUMBAR: ICD-10-CM

## 2017-08-25 DIAGNOSIS — M54.50 ACUTE BILATERAL LOW BACK PAIN WITHOUT SCIATICA: ICD-10-CM

## 2017-08-25 PROCEDURE — 25500064 ZZH RX 255 OP 636: Performed by: PHYSICIAN ASSISTANT

## 2017-08-25 PROCEDURE — 25000125 ZZHC RX 250: Performed by: PHYSICIAN ASSISTANT

## 2017-08-25 PROCEDURE — 62323 NJX INTERLAMINAR LMBR/SAC: CPT

## 2017-08-25 PROCEDURE — 25000128 H RX IP 250 OP 636: Performed by: PHYSICIAN ASSISTANT

## 2017-08-25 PROCEDURE — 40000863 ZZH STATISTIC RADIOLOGY XRAY, US, CT, MAR, NM

## 2017-08-25 RX ORDER — LIDOCAINE 40 MG/G
CREAM TOPICAL
Status: DISCONTINUED | OUTPATIENT
Start: 2017-08-25 | End: 2017-08-25 | Stop reason: HOSPADM

## 2017-08-25 RX ORDER — DEXAMETHASONE SODIUM PHOSPHATE 10 MG/ML
20 INJECTION, SOLUTION INTRAMUSCULAR; INTRAVENOUS ONCE
Status: COMPLETED | OUTPATIENT
Start: 2017-08-25 | End: 2017-08-25

## 2017-08-25 RX ORDER — LIDOCAINE HYDROCHLORIDE 10 MG/ML
30 INJECTION, SOLUTION EPIDURAL; INFILTRATION; INTRACAUDAL; PERINEURAL ONCE
Status: COMPLETED | OUTPATIENT
Start: 2017-08-25 | End: 2017-08-25

## 2017-08-25 RX ORDER — IOPAMIDOL 408 MG/ML
10 INJECTION, SOLUTION INTRATHECAL ONCE
Status: COMPLETED | OUTPATIENT
Start: 2017-08-25 | End: 2017-08-25

## 2017-08-25 RX ADMIN — LIDOCAINE HYDROCHLORIDE 10 ML: 10 INJECTION, SOLUTION EPIDURAL; INFILTRATION; INTRACAUDAL; PERINEURAL at 08:29

## 2017-08-25 RX ADMIN — DEXAMETHASONE SODIUM PHOSPHATE 20 MG: 10 INJECTION, SOLUTION INTRAMUSCULAR; INTRAVENOUS at 08:28

## 2017-08-25 RX ADMIN — IOPAMIDOL 5 ML: 408 INJECTION, SOLUTION INTRATHECAL at 08:22

## 2017-08-25 NOTE — DISCHARGE INSTRUCTIONS
Steroid Injection Discharge Instructions     After you go home:      You may resume your normal diet.    Care of Puncture Site:      If you have a bandaid on your puncture site, you may remove it the next morning    You may shower tomorrow    No bath tubs, whirlpools or swimming for at least 3 days     Activity:      You may go back to normal activity in 24 hours    You should let pain be your guide as to the extent of your activities    Maintain any activity limitations as ordered by your provider    Do NOT drive a vehicle until tomorrow    Medicines:      You may resume all medications    Resume your Warfarin/Coumadin at your regular dose today. Follow up with your provider to have your INR rechecked    Resume your Platelet Inhibitors and Aspirin tomorrow at your regular dose    For minor pain, you may take Acetaminophen (Tylenol) or Ibuprofen (Advil)    Pain:       You may experience increased or different pain over the next 24-48 hours    For the next 48 hrs - you may use ice packs for discomfort     Call your primary care doctor if:      You have severe pain that does not improve with pain medication    You have chills or a fever greater than 101 F (38 C)    The site is red, swollen, hot or tender    New problems with your bowel or bladder    Any questions or concerns    Other Instructions:      New numbness down your leg post injection is temporary and may last for up to 6 hours. You may need assistance with activity until your leg has normal sensation.    If you are diabetic, monitor your blood sugar closely. Contact the provider who manages your diabetes to help you control your blood sugar if needed.    For Your Information:      A steroid was injected to help decrease swelling and may help to reduce pain. It may take up to 7-10 days to obtain full results.    Some patients will get lasting relief from a single injection. Others may require up to 3 injections to get results. If you have more than one  steroid injection, they should be given 2 weeks apart.    Side effects of your steroid injection are mild and will go away in 2-3 days  - Insomnia  - Heartburn  - Flushed face  - Water retention  - Increased appetite  - Increased blood sugar      If you have questions call:        [facility] Radiology Dept @ [phone]      The provider who performed your procedure was __Laorr_____.

## 2017-08-25 NOTE — IP AVS SNAPSHOT
[place] [address]    [place] [address]    Phone:  [phone]                                       After Visit Summary   8/25/2017    Melissa Painter    MRN: [id number]           After Visit Summary Signature Page     I have received my discharge instructions, and my questions have been answered. I have discussed any challenges I see with this plan with the nurse or doctor.    ..........................................................................................................................................  Patient/Patient Representative Signature      ..........................................................................................................................................  Patient Representative Print Name and Relationship to Patient    ..................................................               ................................................  Date                                            Time    ..........................................................................................................................................  Reviewed by Signature/Title    ...................................................              ..............................................  Date                                                            Time

## 2017-08-25 NOTE — PROGRESS NOTES
Patient returns from the xray department, moving all extremities well, skin is warm and dry.  Discharge instructions reviewed with the patient.

## 2017-08-25 NOTE — IP AVS SNAPSHOT
MRN:[id number]                      After Visit Summary   8/25/2017    Melissa Painter    MRN: [id number]           Visit Information        Department      8/25/2017  7:07 AM [facility]          Review of your medicines      UNREVIEWED medicines. Ask your doctor about these medicines        Dose / Directions    Calcium Carb-Cholecalciferol 1000-800 MG-UNIT Tabs   Commonly known as:  CALCIUM 1000 + D   Used for:  Chronic fatigue, Vitamin D deficiency        Dose:  1 tablet   Take 1 tablet by mouth daily TAKE WITH FOOD, FOR BONE HEALTH AND FOR VITAMIN D SUPPLEMENTATION   Quantity:  100 tablet   Refills:  PRN       cetirizine 10 MG tablet   Commonly known as:  zyrTEC   Used for:  Seasonal allergic rhinitis        TAKE ONE TABLET BY MOUTH EVERY DAY TO TREAT NASAL ALLERGIES   Quantity:  90 tablet   Refills:  2       cholecalciferol 5000 UNITS Caps   Used for:  Chronic fatigue        Dose:  1 capsule   Take 1 capsule (5,000 Units) by mouth daily INDICATION: LOW VITAMIN D, TAKE WITH FOOD   Quantity:  90 capsule   Refills:  3       ferrous fumarate 65 mg ([place]. FE)-Vitamin C 125 mg  MG Tabs tablet   Commonly known as:  VITRON C   Used for:  Chronic fatigue, Iron deficiency anemia, unspecified iron deficiency anemia type        Dose:  1 tablet   Take 1 tablet by mouth every morning (before breakfast) INDICATION: IRON SUPPLEMENT   Quantity:  100 tablet   Refills:  prn       fluticasone 50 MCG/ACT spray   Commonly known as:  FLONASE   Used for:  Seasonal allergic rhinitis        Dose:  2 spray   Spray 2 sprays in nostril At Bedtime INDICATION: TO CONTROL NASAL ALLERGY SYMPTOMS   Quantity:  16 g   Refills:  PRN       IBUPROFEN PO        Refills:  0       PRENATAL LOW IRON 27-1 MG Tabs   Used for:  Iron deficiency anemia, unspecified iron deficiency anemia type, Menorrhagia with regular cycle        Dose:  1 tablet   Take 1 tablet by mouth daily INDICATION: VITAMIN SUPPLEMENTATION    Quantity:  90 tablet   Refills:  prn                Protect others around you: Learn how to safely use, store and throw away your medicines at www.disposemymeds.org.         Follow-ups after your visit         Care Instructions        Further instructions from your care team       Steroid Injection Discharge Instructions     After you go home:      You may resume your normal diet.    Care of Puncture Site:      If you have a bandaid on your puncture site, you may remove it the next morning    You may shower tomorrow    No bath tubs, whirlpools or swimming for at least 3 days     Activity:      You may go back to normal activity in 24 hours    You should let pain be your guide as to the extent of your activities    Maintain any activity limitations as ordered by your provider    Do NOT drive a vehicle until tomorrow    Medicines:      You may resume all medications    Resume your Warfarin/Coumadin at your regular dose today. Follow up with your provider to have your INR rechecked    Resume your Platelet Inhibitors and Aspirin tomorrow at your regular dose    For minor pain, you may take Acetaminophen (Tylenol) or Ibuprofen (Advil)    Pain:       You may experience increased or different pain over the next 24-48 hours    For the next 48 hrs - you may use ice packs for discomfort     Call your primary care doctor if:      You have severe pain that does not improve with pain medication    You have chills or a fever greater than 101 F (38 C)    The site is red, swollen, hot or tender    New problems with your bowel or bladder    Any questions or concerns    Other Instructions:      New numbness down your leg post injection is temporary and may last for up to 6 hours. You may need assistance with activity until your leg has normal sensation.    If you are diabetic, monitor your blood sugar closely. Contact the provider who manages your diabetes to help you control your blood sugar if needed.    For Your  "Information:      A steroid was injected to help decrease swelling and may help to reduce pain. It may take up to 7-10 days to obtain full results.    Some patients will get lasting relief from a single injection. Others may require up to 3 injections to get results. If you have more than one steroid injection, they should be given 2 weeks apart.    Side effects of your steroid injection are mild and will go away in 2-3 days  - Insomnia  - Heartburn  - Flushed face  - Water retention  - Increased appetite  - Increased blood sugar      If you have questions call:        [facility] Radiology Dept @ [phone]      The provider who performed your procedure was __Laorr_____.         Additional Information About Your Visit        [employer] Information     [employer] gives you secure access to your electronic health record. If you see a primary care provider, you can also send messages to your care team and make appointments. If you have questions, please call your primary care clinic.  If you do not have a primary care provider, please call [phone] and they will assist you.        Care EveryWhere ID     This is your Care EveryWhere ID. This could be used by other organizations to access your [facility] medical records  [id number]        Your Vitals Were     Blood Pressure Pulse Temperature Respirations Height Weight    110/82 82 97.9  F (36.6  C) (Oral) 16 1.676 m (5' 6\") 92.1 kg (203 lb)    Last Period Pulse Oximetry BMI (Body Mass Index)             08/05/2017 (Within Days) 98% 32.77 kg/m2          Primary Care Provider Office Phone # Fax #    Kaci Francisco [phone]      Equal Access to Services     [facility]: Hadii aad ku hadasho Sol[place], waaxda luqadaha, qaybta kaalmada adeegjuan carlos, prakash noguera. So [facility] [phone].    ATENCIÓN: Si habla español, tiene a edwards disposición servicios gratuitos de asistencia lingüística. Llame al [phone].    We " comply with applicable federal civil rights laws and [place] laws. We do not discriminate on the basis of race, color, national origin, age, disability sex, sexual orientation or gender identity.            Thank you!     Thank you for choosing [facility] for your care. Our goal is always to provide you with excellent care. Hearing back from our patients is one way we can continue to improve our services. Please take a few minutes to complete the written survey that you may receive in the mail after you visit with us. Thank you!             Medication List: This is a list of all your medications and when to take them. Check marks below indicate your daily home schedule. Keep this list as a reference.      Medications           Morning Afternoon Evening Bedtime As Needed    Calcium Carb-Cholecalciferol 1000-800 MG-UNIT Tabs   Commonly known as:  CALCIUM 1000 + D   Take 1 tablet by mouth daily TAKE WITH FOOD, FOR BONE HEALTH AND FOR VITAMIN D SUPPLEMENTATION                                cetirizine 10 MG tablet   Commonly known as:  zyrTEC   TAKE ONE TABLET BY MOUTH EVERY DAY TO TREAT NASAL ALLERGIES                                cholecalciferol 5000 UNITS Caps   Take 1 capsule (5,000 Units) by mouth daily INDICATION: LOW VITAMIN D, TAKE WITH FOOD                                ferrous fumarate 65 mg ([place]. FE)-Vitamin C 125 mg  MG Tabs tablet   Commonly known as:  VITRON C   Take 1 tablet by mouth every morning (before breakfast) INDICATION: IRON SUPPLEMENT                                fluticasone 50 MCG/ACT spray   Commonly known as:  FLONASE   Spray 2 sprays in nostril At Bedtime INDICATION: TO CONTROL NASAL ALLERGY SYMPTOMS                                IBUPROFEN PO                                PRENATAL LOW IRON 27-1 MG Tabs   Take 1 tablet by mouth daily INDICATION: VITAMIN SUPPLEMENTATION

## 2017-08-25 NOTE — PROGRESS NOTES
RADIOLOGY PROCEDURE NOTE  Patient name: Melissa Painter  MRN: [id number]  : 1975    Pre-procedure diagnosis: Low back pain  Post-procedure diagnosis: Same    Procedure Date/Time: 2017  8:32 AM  Procedure: Caudal DEBORA  Estimated blood loss: None  Specimen(s) collected with description: none  The patient tolerated the procedure well with no immediate complications.  Significant findings:none    See imaging dictation for procedural details.    Provider name: Abisai Chambers  Assistant(s):None

## 2017-09-07 DIAGNOSIS — E55.9 VITAMIN D DEFICIENCY: ICD-10-CM

## 2017-09-07 DIAGNOSIS — R53.82 CHRONIC FATIGUE: ICD-10-CM

## 2017-09-08 NOTE — TELEPHONE ENCOUNTER
Calcium Carb-Cholecalciferol (CALCIUM 1000 + D) 1000-800 MG-UNIT TABS    Last Written Prescription Date: 08/18/17  Last Fill Quantity: 100, # refills: PRN  Last Office Visit with G, P or [facility] prescribing provider: 08/18/17       DEXA Scan:  Last order of DX HIP/PELVIS/SPINE was found on 6/2/2017 from [facility] Appointment on 6/2/2017     No order of DX HIP/PELVIS/SPINE W LAT FRACTION ANALYSIS is found.       Creatinine   Date Value Ref Range Status   05/30/2017 0.71 0.52 - 1.04 mg/dL Final

## 2017-10-24 ENCOUNTER — HOSPITAL ENCOUNTER (EMERGENCY)
Facility: CLINIC | Age: 42
Discharge: HOME | End: 2017-10-24
Attending: EMERGENCY MEDICINE | Admitting: EMERGENCY MEDICINE
Payer: OTHER GOVERNMENT

## 2017-10-24 VITALS
BODY MASS INDEX: 32.3 KG/M2 | SYSTOLIC BLOOD PRESSURE: 103 MMHG | HEIGHT: 66 IN | OXYGEN SATURATION: 100 % | RESPIRATION RATE: 18 BRPM | WEIGHT: 201 LBS | DIASTOLIC BLOOD PRESSURE: 79 MMHG | TEMPERATURE: 98.5 F

## 2017-10-24 DIAGNOSIS — R09.1 PLEURISY: ICD-10-CM

## 2017-10-24 LAB
BASOPHILS # BLD AUTO: 0 10E9/L (ref 0–0.2)
BASOPHILS NFR BLD AUTO: 0.7 %
D DIMER PPP FEU-MCNC: <0.3 UG/ML FEU (ref 0–0.5)
DIFFERENTIAL METHOD BLD: ABNORMAL
EOSINOPHIL # BLD AUTO: 0.2 10E9/L (ref 0–0.7)
EOSINOPHIL NFR BLD AUTO: 3.4 %
ERYTHROCYTE [DISTWIDTH] IN BLOOD BY AUTOMATED COUNT: 16.2 % (ref 10–15)
HCT VFR BLD AUTO: 35.1 % (ref 35–47)
HGB BLD-MCNC: 12 G/DL (ref 11.7–15.7)
IMM GRANULOCYTES # BLD: 0 10E9/L (ref 0–0.4)
IMM GRANULOCYTES NFR BLD: 0.2 %
LYMPHOCYTES # BLD AUTO: 1.7 10E9/L (ref 0.8–5.3)
LYMPHOCYTES NFR BLD AUTO: 38.6 %
MCH RBC QN AUTO: 24.7 PG (ref 26.5–33)
MCHC RBC AUTO-ENTMCNC: 34.2 G/DL (ref 31.5–36.5)
MCV RBC AUTO: 72 FL (ref 78–100)
MONOCYTES # BLD AUTO: 0.2 10E9/L (ref 0–1.3)
MONOCYTES NFR BLD AUTO: 4.5 %
NEUTROPHILS # BLD AUTO: 2.4 10E9/L (ref 1.6–8.3)
NEUTROPHILS NFR BLD AUTO: 52.6 %
NRBC # BLD AUTO: 0 10*3/UL
NRBC BLD AUTO-RTO: 0 /100
PLATELET # BLD AUTO: 309 10E9/L (ref 150–450)
RBC # BLD AUTO: 4.86 10E12/L (ref 3.8–5.2)
TROPONIN I SERPL-MCNC: <0.015 UG/L (ref 0–0.04)
WBC # BLD AUTO: 4.5 10E9/L (ref 4–11)

## 2017-10-24 PROCEDURE — 96374 THER/PROPH/DIAG INJ IV PUSH: CPT

## 2017-10-24 PROCEDURE — 85379 FIBRIN DEGRADATION QUANT: CPT | Performed by: EMERGENCY MEDICINE

## 2017-10-24 PROCEDURE — 93005 ELECTROCARDIOGRAM TRACING: CPT

## 2017-10-24 PROCEDURE — 25000128 H RX IP 250 OP 636: Performed by: EMERGENCY MEDICINE

## 2017-10-24 PROCEDURE — 85025 COMPLETE CBC W/AUTO DIFF WBC: CPT | Performed by: EMERGENCY MEDICINE

## 2017-10-24 PROCEDURE — 84484 ASSAY OF TROPONIN QUANT: CPT | Performed by: EMERGENCY MEDICINE

## 2017-10-24 PROCEDURE — 99284 EMERGENCY DEPT VISIT MOD MDM: CPT | Mod: 25

## 2017-10-24 RX ORDER — KETOROLAC TROMETHAMINE 30 MG/ML
30 INJECTION, SOLUTION INTRAMUSCULAR; INTRAVENOUS ONCE
Status: COMPLETED | OUTPATIENT
Start: 2017-10-24 | End: 2017-10-24

## 2017-10-24 RX ORDER — NAPROXEN 500 MG/1
500 TABLET ORAL 2 TIMES DAILY WITH MEALS
Qty: 10 TABLET | Refills: 0 | Status: SHIPPED | OUTPATIENT
Start: 2017-10-24 | End: 2017-10-29

## 2017-10-24 RX ADMIN — KETOROLAC TROMETHAMINE 30 MG: 30 INJECTION, SOLUTION INTRAMUSCULAR at 15:55

## 2017-10-24 ASSESSMENT — ENCOUNTER SYMPTOMS
NAUSEA: 0
FLANK PAIN: 0
SHORTNESS OF BREATH: 1
ABDOMINAL PAIN: 0

## 2017-10-24 NOTE — ED AVS SNAPSHOT
Emergency Department    [address] [facility] [address]    Phone:  [phone]    Fax:  [phone]                                       Melissa Painter   MRN: [id number]    Department:   Emergency Department   Date of Visit:  10/24/2017           Patient Information     Date Of Birth          1975        Your diagnoses for this visit were:     Pleurisy        You were seen by Nessa Mata MD.      Follow-up Information     Schedule an appointment as soon as possible for a visit with Kaci Francisco MD.    Specialties:  Internal Medicine, Pediatrics    Why:  As needed    Contact information:    [address] [facility] [address]  [phone]          Discharge Instructions       Naprosyn 2 times a day for 5 days, activity as tolerated, recheck in the clinic over the next 2-3 days if not improving or getting worse.        Pleurisy    If you have pleurisy, the lining around your lungs is inflamed. This is most often due to a viral infection or pneumonia. It usually lasts for 10 to 14 days. It may cause sharp pain with breathing, coughing, sneezing, and movement. Antibiotics are usually not prescribed for this condition unless pneumonia is also present.  The following tips will help you care for your condition at home:    If symptoms are severe, rest at home for the first 2 to 3 days. When you resume activity, don't let yourself get too tired.    Do not smoke. Also avoid being exposed to secondhand smoke.    You may use over-the-counter medicines to control pain, unless another pain medicine was prescribed. (Note: If you have chronic liver or kidney disease or have ever had a stomach ulcer or gastrointestinal bleeding, talk with your healthcare provider before using these medicines. Also talk to your provider if you are taking medicine to prevent blood clots.) Aspirin should never be given to anyone younger than 18 years of age who is ill with a viral infection or fever. It  may cause severe liver or brain damage.  Follow-up care  Follow up with your healthcare provider, or as advised.  When to seek medical advice  Call your healthcare provider right away if any of these occur:    Fever over 100.4 F (38 C)    Coughing up lots of colored sputum (mucus)    Redness, pain, or swelling of the leg  Call 911, or get immediate medical care  Contact emergency services right away if any of these occur:    Increasing shortness of breath    Increasing chest pain, or pain that spreads to the neck, arm, or back    Coughing up blood  Date Last Reviewed: 9/13/2015 2000-2017 [employer]. [address] [place] [address]. All rights reserved. This information is not intended as a substitute for professional medical care. Always follow your healthcare professional's instructions.          Future Appointments        Provider Department Dept Phone Center    10/27/2017 8:40 AM Ana Lilia Amaro PA-C [facility] [phone]       24 Hour Appointment Hotline       To make an appointment at [facility], call [phone] ([phone]). If you don't have a family doctor or clinic, we will help you find one. [facility] are conveniently located to serve the needs of you and your family.             Review of your medicines      START taking        Dose / Directions Last dose taken    naproxen 500 MG tablet   Commonly known as:  NAPROSYN   Dose:  500 mg   Quantity:  10 tablet        Take 1 tablet (500 mg) by mouth 2 times daily (with meals) for 5 days   Refills:  0          Our records show that you are taking the medicines listed below. If these are incorrect, please call your family doctor or clinic.        Dose / Directions Last dose taken    CALCIUM 1000 + D 1000-800 MG-UNIT Tabs   Quantity:  100 tablet   Generic drug:  Calcium Carb-Cholecalciferol        TAKE ONE TABLET BY MOUTH EVERY DAY WITH FOOD FOR BONE HEALTH AND VITAMIN D  SUPPLEMENTATION   Refills:  3        cetirizine 10 MG tablet   Commonly known as:  zyrTEC   Quantity:  90 tablet        TAKE ONE TABLET BY MOUTH EVERY DAY TO TREAT NASAL ALLERGIES   Refills:  2        cholecalciferol 5000 UNITS Caps   Dose:  1 capsule   Quantity:  90 capsule        Take 1 capsule (5,000 Units) by mouth daily INDICATION: LOW VITAMIN D, TAKE WITH FOOD   Refills:  3        ferrous fumarate 65 mg ([place]. FE)-Vitamin C 125 mg  MG Tabs tablet   Commonly known as:  VITRON C   Dose:  1 tablet   Quantity:  100 tablet        Take 1 tablet by mouth every morning (before breakfast) INDICATION: IRON SUPPLEMENT   Refills:  prn        fluticasone 50 MCG/ACT spray   Commonly known as:  FLONASE   Dose:  2 spray   Quantity:  16 g        Spray 2 sprays in nostril At Bedtime INDICATION: TO CONTROL NASAL ALLERGY SYMPTOMS   Refills:  PRN        IBUPROFEN PO        Refills:  0        PRENATAL LOW IRON 27-1 MG Tabs   Dose:  1 tablet   Quantity:  90 tablet        Take 1 tablet by mouth daily INDICATION: VITAMIN SUPPLEMENTATION   Refills:  prn                Prescriptions were sent or printed at these locations (1 Prescription)                   Other Prescriptions                Printed at Department/Unit printer (1 of 1)         naproxen (NAPROSYN) 500 MG tablet                Procedures and tests performed during your visit     CBC with platelets + differential    Cardiac Continuous Monitoring    D dimer quantitative    EKG 12 lead    Troponin I (now)      Orders Needing Specimen Collection     None      Pending Results     No orders found from 10/22/2017 to 10/25/2017.            Pending Culture Results     No orders found from 10/22/2017 to 10/25/2017.            Pending Results Instructions     If you had any lab results that were not finalized at the time of your Discharge, you can call the ED Lab Result RN at [phone]. You will be contacted by this team for any positive Lab results or changes in treatment.  The nurses are available 7 days a week from 10A to 6:30P.  You can leave a message 24 hours per day and they will return your call.        Test Results From Your Hospital Stay        10/24/2017  4:15 PM      Component Results     Component Value Ref Range & Units Status    D Dimer <0.3 0.0 - 0.50 ug/ml FEU Final    This D-dimer assay is intended for use in conjunction with a clinical pretest   probability assessment model to exclude pulmonary embolism (PE) and deep   venous thrombosis (DVT) in outpatients suspected of PE or DVT. The cut-off   value is 0.5 ug/mL FEU.           10/24/2017  4:21 PM      Component Results     Component Value Ref Range & Units Status    Troponin I ES <0.015 0.000 - 0.045 ug/L Final    The 99th percentile for upper reference range is 0.045 ug/L.  Troponin values   in the range of 0.045 - 0.120 ug/L may be associated with risks of adverse   clinical events.           10/24/2017  4:03 PM      Component Results     Component Value Ref Range & Units Status    WBC 4.5 4.0 - 11.0 10e9/L Final    RBC Count 4.86 3.8 - 5.2 10e12/L Final    Hemoglobin 12.0 11.7 - 15.7 g/dL Final    Hematocrit 35.1 35.0 - 47.0 % Final    MCV 72 (L) 78 - 100 fl Final    MCH 24.7 (L) 26.5 - 33.0 pg Final    MCHC 34.2 31.5 - 36.5 g/dL Final    RDW 16.2 (H) 10.0 - 15.0 % Final    Platelet Count 309 150 - 450 10e9/L Final    Diff Method Automated Method  Final    % Neutrophils 52.6 % Final    % Lymphocytes 38.6 % Final    % Monocytes 4.5 % Final    % Eosinophils 3.4 % Final    % Basophils 0.7 % Final    % Immature Granulocytes 0.2 % Final    Nucleated RBCs 0 0 /100 Final    Absolute Neutrophil 2.4 1.6 - 8.3 10e9/L Final    Absolute Lymphocytes 1.7 0.8 - 5.3 10e9/L Final    Absolute Monocytes 0.2 0.0 - 1.3 10e9/L Final    Absolute Eosinophils 0.2 0.0 - 0.7 10e9/L Final    Absolute Basophils 0.0 0.0 - 0.2 10e9/L Final    Abs Immature Granulocytes 0.0 0 - 0.4 10e9/L Final    Absolute Nucleated RBC 0.0  Final                 Clinical Quality Measure: Blood Pressure Screening     Your blood pressure was checked while you were in the emergency department today. The last reading we obtained was  BP: 103/79 . Please read the guidelines below about what these numbers mean and what you should do about them.  If your systolic blood pressure (the top number) is less than 120 and your diastolic blood pressure (the bottom number) is less than 80, then your blood pressure is normal. There is nothing more that you need to do about it.  If your systolic blood pressure (the top number) is 120-139 or your diastolic blood pressure (the bottom number) is 80-89, your blood pressure may be higher than it should be. You should have your blood pressure rechecked within a year by a primary care provider.  If your systolic blood pressure (the top number) is 140 or greater or your diastolic blood pressure (the bottom number) is 90 or greater, you may have high blood pressure. High blood pressure is treatable, but if left untreated over time it can put you at risk for heart attack, stroke, or kidney failure. You should have your blood pressure rechecked by a primary care provider within the next 4 weeks.  If your provider in the emergency department today gave you specific instructions to follow-up with your doctor or provider even sooner than that, you should follow that instruction and not wait for up to 4 weeks for your follow-up visit.        Thank you for choosing [facility]       Thank you for choosing [facility] for your care. Our goal is always to provide you with excellent care. Hearing back from our patients is one way we can continue to improve our services. Please take a few minutes to complete the written survey that you may receive in the mail after you visit with us. Thank you!        [employer] Information     [employer] gives you secure access to your electronic health record. If you see a primary care provider, you can also send messages to your care  team and make appointments. If you have questions, please call your primary care clinic.  If you do not have a primary care provider, please call [phone] and they will assist you.        Care EveryWhere ID     This is your Care EveryWhere ID. This could be used by other organizations to access your [facility] medical records  [id number]        Equal Access to Services     PENNY THIOBDEAUX : Dat Viera, gabriela coleman, prakash pond. So [facility] [phone].    ATENCIÓN: Si habla español, tiene a edwards disposición servicios gratuitos de asistencia lingüística. Vel al [phone].    We comply with applicable federal civil rights laws and [place] laws. We do not discriminate on the basis of race, color, national origin, age, disability, sex, sexual orientation, or gender identity.            After Visit Summary       This is your record. Keep this with you and show to your community pharmacist(s) and doctor(s) at your next visit.

## 2017-10-24 NOTE — ED PROVIDER NOTES
"  History     Chief Complaint:  Chest Pain    HPI   Melissa Painter is a 42 year old female who presents to the emergency department today for evaluation of intermittent, sharp, left-sided chest pain that started around 13:00 today while she was sitting in class. She denies any changes in exercise or exertion levels of late and walking does not make it worse. Her pain is accompanied by shortness of breath. The patient denies ever having symptoms like this before. She notes some pain and leg swelling in her left lower leg that has been on-going for the past month. She denies any recent travel or recent illnesses. No nausea or flank pain as well as no abdominal pain.    Allergies:  Levaquin [Levofloxacin] - itching  Influenza Vaccine Live      Medications:    CALCIUM 1000 + D 1000-800 MG-UNIT TABS  ferrous fumarate 65 mg, Council. FE,-Vitamin C 125 mg (VITRON C)  MG TABS tablet  cholecalciferol 5000 UNITS CAPS  cetirizine (ZYRTEC) 10 MG tablet     Past Medical History:    Anemia  Chronic fatigue  Cystocele  Urgency-frequency syndrome    Past Surgical History:    Permanent fallopian implant  [place] teeth extraction    Family History:    Hypertension     Social History:  The patient was accompanied to the ED alone.  Smoking Status: never  Alcohol Use: yes    Marital Status:   [2]     Review of Systems   Respiratory: Positive for shortness of breath.    Cardiovascular: Positive for chest pain.   Gastrointestinal: Negative for abdominal pain and nausea.   Genitourinary: Negative for flank pain.   All other systems reviewed and are negative.    Physical Exam     Patient Vitals for the past 24 hrs:   BP Temp Temp src Heart Rate Resp SpO2 Height Weight   10/24/17 1642 - - - 74 18 - - -   10/24/17 1631 - - - 63 18 - - -   10/24/17 1630 103/79 - - - - - - -   10/24/17 1600 108/81 - - 65 29 - - -   10/24/17 1531 118/76 - - 67 10 - - -   10/24/17 1447 113/80 98.5  F (36.9  C) Temporal 70 18 100 % 1.676 m (5' 6\") 91.2 " kg (201 lb)      Physical Exam  Nursing note and vitals reviewed.  Constitutional:  Appears well-developed and well-nourished, comfortable.   Eyes:    Conjunctivae are normal.      Pupils are equal, round, and reactive to light.      Right eye exhibits no discharge. Left eye exhibits no discharge.      No scleral icterus.   Cardiovascular:  Normal rate, regular rhythm.      Normal heart sounds and intact distal pulses.       No murmur heard.  Pulmonary/Chest:  Effort normal and breath sounds normal. No respiratory distress.     No wheezes. No rales. No chest wall tenderness. No stridor.   Abdominal:   Soft. No distension and no mass. No tenderness.      No rebound and no guarding. No flank pain.  Musculoskeletal:  Normal range of motion.      No leg edema. Mild tenderness of the left costal-chondral margin.                                      Neck supple, no midline cervical tenderness.   Neurological:   Alert and oriented to person, place, and year.      No cranial nerve deficit.      Exhibits normal muscle tone. Coordination normal.      GCS eye subscore is 4. GCS verbal subscore is 5.      GCS motor subscore is 6.   Skin:    Skin is warm and dry. No rash noted. No diaphoresis.      No erythema. No pallor.   Psychiatric:   Behavior is normal. Judgment and thought content normal.     Emergency Department Course     ECG:  ECG taken at 1440, ECG read at 1443  Normal sinus rhythm  Normal ECG  Rate 75 bpm. TX interval 156. QRS duration 78. QT/QTc 384/428. P-R-T axes 62,29,41.     Laboratory:  Laboratory findings were communicated with the patient who voiced understanding of the findings.  Troponin (Collected 1529): <0.015   D Dimer (Collected 1529): <0.3  CBC: WBC 4.5, HGB 12.0,     Interventions:  1555 Toradol 30 mg IV     Emergency Department Course:  Nursing notes and vitals reviewed.  1520 I entered the room.  1523 I performed an exam of the patient as documented above.   IV was inserted and blood was drawn  for laboratory testing, results above.  The patient received the above intervention(s).   1640 the patient was rechecked and she was updated on the results of her laboratory studies.    I discussed the treatment plan with the patient. They expressed understanding of this plan and consented to discharge. They will be discharged home with instructions for care and follow up. In addition, the patient will return to the emergency department if their symptoms persist, worsen, if new symptoms arise or if there is any concern.  All questions were answered.     Impression & Plan      Medical Decision Making:  The patient comes in with some left anterior pleuritic chest pain that radiates under the breast and around to the side. Atypical for cardiac ischemia and she has no risk factors. EKG is normal, troponin is normal. She felt that her left leg was a little bit more tense or sore lately, so I did get a d-dimer and it is normal even though she didn't have any risk factors for PE. CBC is normal. She was given Toradol and did improve her pain somewhat. I believe this is inflammatory and probably pleurisy. Could be chest wall inflammation. I'm going to place her on Naprosyn and have her follow-up if not improving in the clinic.    Diagnosis:    ICD-10-CM    1. Pleurisy R09.1      Disposition:   The patient was discharged to home. Naprosyn 2 times a day for 5 days, activity as tolerated, recheck in the clinic over the next 2-3 days if not improving or getting worse.    Discharge Medications:  Discharge Medication List as of 10/24/2017  4:40 PM      START taking these medications    Details   naproxen (NAPROSYN) 500 MG tablet Take 1 tablet (500 mg) by mouth 2 times daily (with meals) for 5 days, Disp-10 tablet, R-0, Local Print           Scribe Disclosure:  I, Obie Broussard, am serving as a scribe at 3:20 PM on 10/24/2017 to document services personally performed by Nessa Mata MD, based on my observations and the  provider's statements to me.    EMERGENCY DEPARTMENT       Nessa Mata MD  10/24/17 [id number]

## 2017-10-24 NOTE — ED AVS SNAPSHOT
Emergency Department    [address] [place] [address]    Phone:  [phone]    Fax:  [phone]                                       Melissa Painter   MRN: [id number]    Department:   Emergency Department   Date of Visit:  10/24/2017           After Visit Summary Signature Page     I have received my discharge instructions, and my questions have been answered. I have discussed any challenges I see with this plan with the nurse or doctor.    ..........................................................................................................................................  Patient/Patient Representative Signature      ..........................................................................................................................................  Patient Representative Print Name and Relationship to Patient    ..................................................               ................................................  Date                                            Time    ..........................................................................................................................................  Reviewed by Signature/Title    ...................................................              ..............................................  Date                                                            Time

## 2017-10-24 NOTE — DISCHARGE INSTRUCTIONS
Naprosyn 2 times a day for 5 days, activity as tolerated, recheck in the clinic over the next 2-3 days if not improving or getting worse.        Pleurisy    If you have pleurisy, the lining around your lungs is inflamed. This is most often due to a viral infection or pneumonia. It usually lasts for 10 to 14 days. It may cause sharp pain with breathing, coughing, sneezing, and movement. Antibiotics are usually not prescribed for this condition unless pneumonia is also present.  The following tips will help you care for your condition at home:    If symptoms are severe, rest at home for the first 2 to 3 days. When you resume activity, don't let yourself get too tired.    Do not smoke. Also avoid being exposed to secondhand smoke.    You may use over-the-counter medicines to control pain, unless another pain medicine was prescribed. (Note: If you have chronic liver or kidney disease or have ever had a stomach ulcer or gastrointestinal bleeding, talk with your healthcare provider before using these medicines. Also talk to your provider if you are taking medicine to prevent blood clots.) Aspirin should never be given to anyone younger than 18 years of age who is ill with a viral infection or fever. It may cause severe liver or brain damage.  Follow-up care  Follow up with your healthcare provider, or as advised.  When to seek medical advice  Call your healthcare provider right away if any of these occur:    Fever over 100.4 F (38 C)    Coughing up lots of colored sputum (mucus)    Redness, pain, or swelling of the leg  Call 911, or get immediate medical care  Contact emergency services right away if any of these occur:    Increasing shortness of breath    Increasing chest pain, or pain that spreads to the neck, arm, or back    Coughing up blood  Date Last Reviewed: 9/13/2015 2000-2017 [employer]. [address] [facility], [place] [address]. All rights reserved. This information is not intended as a  substitute for professional medical care. Always follow your healthcare professional's instructions.

## 2017-11-02 ENCOUNTER — TELEPHONE (OUTPATIENT)
Dept: INTERNAL MEDICINE | Facility: CLINIC | Age: 42
End: 2017-11-02

## 2017-12-19 ENCOUNTER — OFFICE VISIT (OUTPATIENT)
Dept: PODIATRY | Facility: CLINIC | Age: 42
End: 2017-12-19
Payer: OTHER GOVERNMENT

## 2017-12-19 VITALS
BODY MASS INDEX: 32.59 KG/M2 | SYSTOLIC BLOOD PRESSURE: 117 MMHG | DIASTOLIC BLOOD PRESSURE: 80 MMHG | HEIGHT: 66 IN | WEIGHT: 202.8 LBS

## 2017-12-19 DIAGNOSIS — M54.50 ACUTE RIGHT-SIDED LOW BACK PAIN WITHOUT SCIATICA: Primary | ICD-10-CM

## 2017-12-19 DIAGNOSIS — M51.369 DDD (DEGENERATIVE DISC DISEASE), LUMBAR: ICD-10-CM

## 2017-12-19 PROCEDURE — 99214 OFFICE O/P EST MOD 30 MIN: CPT | Performed by: FAMILY MEDICINE

## 2017-12-19 RX ORDER — DICLOFENAC SODIUM 75 MG/1
75 TABLET, DELAYED RELEASE ORAL 2 TIMES DAILY PRN
Qty: 30 TABLET | Refills: 1 | Status: SHIPPED | OUTPATIENT
Start: 2017-12-19 | End: 2018-04-27

## 2017-12-19 ASSESSMENT — ENCOUNTER SYMPTOMS
FOCAL WEAKNESS: 0
MYALGIAS: 1
SENSORY CHANGE: 0

## 2017-12-19 NOTE — LETTER
12/19/2017         RE: Melissa Painter  [address]  [place] [address]        Dear Colleague,    Thank you for referring your patient, Melissa Painter, to the [facility] [facility]. Please see a copy of my visit note below.    HPI   [facility]   Follow-up Visit s Dec 19, 2017    PCP: Kaci Francisco      Subjective:  Melissa is a 42 year old female who is seen in follow up for evaluation of   Chief Complaint   Patient presents with     RECHECK     Her last visit was on Visit date not found.  Since that time, symptoms have been recurred, although different.      Melissa Painter is alone today.    Patient had a lumbar DEBORA injection on 8/25/17 that provided 85% relief, lasting up until 2 weeks ago. Using head, no ice.     Patient has noticed worsened. with DEBORA wearing off. Patient notices increased pain with physical activity.      Pain is located low back right side, constant. Different pain than last time. Used 600mg ibuprofen just once.     Continues to do home back exercises.    Patient denies any new injuries.    Patient's past medical, surgical, social and family histories are reviewed today.    Social History: works as a CNA at [employer]     Past Medical History:   Diagnosis Date     Anemia      Chronic fatigue 4/7/2016     Urgency-frequency syndrome        Patient Active Problem List    Diagnosis Date Noted     Acute right-sided low back pain without sciatica 12/19/2017     Priority: Medium     Hepatitis B carrier (H) 05/30/2017     Priority: Medium     Cervicalgia 11/01/2016     Priority: Medium     Acute bilateral low back pain without sciatica 11/01/2016     Priority: Medium     Rectocele 05/05/2016     Priority: Medium     Cystocele, midline 05/05/2016     Priority: Medium     Urgency-frequency syndrome 05/05/2016     Priority: Medium     Iron deficiency anemia, unspecified iron deficiency 04/07/2016     Priority: Medium     Chronic fatigue 04/07/2016      "Priority: Medium     Vitamin D deficiency 04/07/2016     Priority: Medium     Hair loss 04/07/2016     Priority: Medium     Immunity status testing 04/07/2016     Priority: Medium     Excessive or frequent menstruation 04/07/2016     Priority: Medium     CARDIOVASCULAR SCREENING; LDL GOAL LESS THAN 160 04/07/2016     Priority: Medium     Seasonal allergic rhinitis 04/07/2016     Priority: Medium       Family History   Problem Relation Age of Onset     Hypertension Mother        Social History     Social History     Marital Status: Single     Spouse Name: N/A     Number of Children: N/A     Years of Education: N/A     Occupational History     CNA      Social History Main Topics     Smoking status: Never Smoker      Smokeless tobacco: Never Used     Alcohol Use: 0.0 oz/week     0 Standard drinks or equivalent per week      Comment: 2-3 glasses wine/yr     Drug Use: No       Past Surgical History:   Procedure Laterality Date     AS HYSTEROS W PERMANENT FALLOPAIN IMPLANT  2013    ESSURE     wisdom teeth         Review of Systems   Musculoskeletal: Positive for myalgias.   Neurological: Negative for sensory change and focal weakness.   All other systems reviewed and are negative.        Physical Exam  /80  Ht 5' 6\" (1.676 m)  Wt 202 lb 12.8 oz (92 kg)  BMI 32.73 kg/m2  Constitutional:well-developed, well-nourished, and in no distress.   Cardiovascular: Intact distal pulses.    Neurological: alert. Gait normal.   Skin: Skin is warm and dry.   Psychiatric: Mood and affect normal.   Respiratory: unlabored, speaks in full sentences  Lymph: no LAD, no lymphangitis      Back Exam   Sensation: Normal.  Gait: Normal.    Tenderness   The patient is experiencing tenderness in the lumbar.    Range of Motion   Flexion:                    Abnormal  Extension:                Normal  Lateral Bend Left:    Normal  Lateral Bend Right:  Normal  Rotation Right:         Normal  Rotation Left:           Normal  SLR    Right: " Negative  Left:    Negative    Muscle Strength   Normal back strength    Tests   Toe Walk: n/t  Heel Walk: n/t    Reflexes   Patellar:  Normal  Achilles:  Normal    Comments:  FROM, pain at end range of flexion, mild    Tenderness to palpation  RIGHT lumbar area only        MR LUMBAR SPINE WITHOUT CONTRAST August 11, 2017 8:27 AM      HISTORY: Eight months low back pain, not resolving with medication and  physical therapy, evaluate for discogenic injury. Low back pain. Left  leg pain.     TECHNIQUE: Sagittal T1 and STIR. Sagittal T2 and axial dual-echo T2.      FINDINGS: Five lumbar vertebrae are assumed. Disc dehydration and  discogenic marrow change at L4-L5. Disc dehydration and high signal  posterior annular fissuring at L5-S1. There is a greater percentage of  red marrow than typically seen. This can be seen as a normal variant,  but also in association with various forms of anemia and clinical  correlation (including CBC) is recommended.      Findings by specific level:     There is minimal to mild multilevel facet degenerative change.     T12-L1, L1-L2, L2-L3, L3-L4: No disc herniation or stenosis.     L4-L5: Disc bulging without central stenosis. Mild bilateral foraminal  stenosis.     L5-S1: Disc bulging without central stenosis. The neural foramina  appear adequate.         IMPRESSION:  1. Disc bulging at L4-L5 and L5-S1, without central stenosis or  high-grade foraminal stenosis.  2. Nonspecific red marrow pattern, as above.     JOHANA ADAMS MD        ICD-10-CM    1. Acute right-sided low back pain without sciatica M54.5 diclofenac (VOLTAREN) 75 MG EC tablet   2. DDD (degenerative disc disease), lumbar M51.36 diclofenac (VOLTAREN) 75 MG EC tablet       Mild acute low back pain, not exactly the same type of pain as before.     Nature of nonradicular low back pain discussed at length. Elaborated on role of activity rather than inactivity to minimize symptom duration. Short term pain relief symptoms discussed  and side effects of various options reviewed. Emphasized long term management with strengthening and stabilization. Other options including chiropractic and physical therapy explored.    declines return to PHYSICAL THERAPY. will resume home exercises. Ok for trial of voltaren.       Again, thank you for allowing me to participate in the care of your patient.        Sincerely,        Lupillo Lenz MD

## 2017-12-19 NOTE — PROGRESS NOTES
[facility]   Follow-up Visit s Dec 19, 2017    PCP: Kaci Francisco      Subjective:  Melissa is a 42 year old female who is seen in follow up for evaluation of   Chief Complaint   Patient presents with     RECHECK     Her last visit was on Visit date not found.  Since that time, symptoms have been recurred, although different.      Melissa Painter is alone today.    Patient had a lumbar DEBORA injection on 8/25/17 that provided 85% relief, lasting up until 2 weeks ago. Using head, no ice.     Patient has noticed worsened. with DEBORA wearing off. Patient notices increased pain with physical activity.      Pain is located low back right side, constant. Different pain than last time. Used 600mg ibuprofen just once.     Continues to do home back exercises.    Patient denies any new injuries.    Patient's past medical, surgical, social and family histories are reviewed today.    Social History: works as a CNA at [email or web address]     Past Medical History:   Diagnosis Date     Anemia      Chronic fatigue 4/7/2016     Urgency-frequency syndrome        Patient Active Problem List    Diagnosis Date Noted     Acute right-sided low back pain without sciatica 12/19/2017     Priority: Medium     Hepatitis B carrier (H) 05/30/2017     Priority: Medium     Cervicalgia 11/01/2016     Priority: Medium     Acute bilateral low back pain without sciatica 11/01/2016     Priority: Medium     Rectocele 05/05/2016     Priority: Medium     Cystocele, midline 05/05/2016     Priority: Medium     Urgency-frequency syndrome 05/05/2016     Priority: Medium     Iron deficiency anemia, unspecified iron deficiency 04/07/2016     Priority: Medium     Chronic fatigue 04/07/2016     Priority: Medium     Vitamin D deficiency 04/07/2016     Priority: Medium     Hair loss 04/07/2016     Priority: Medium     Immunity status testing 04/07/2016     Priority: Medium     Excessive or frequent menstruation 04/07/2016     Priority:  "Medium     CARDIOVASCULAR SCREENING; LDL GOAL LESS THAN 160 04/07/2016     Priority: Medium     Seasonal allergic rhinitis 04/07/2016     Priority: Medium       Family History   Problem Relation Age of Onset     Hypertension Mother        Social History     Social History     Marital Status: Single     Spouse Name: N/A     Number of Children: N/A     Years of Education: N/A     Occupational History     CNA      Social History Main Topics     Smoking status: Never Smoker      Smokeless tobacco: Never Used     Alcohol Use: 0.0 oz/week     0 Standard drinks or equivalent per week      Comment: 2-3 glasses wine/yr     Drug Use: No       Past Surgical History:   Procedure Laterality Date     AS HYSTEROS W PERMANENT FALLOPAIN IMPLANT  2013    ESSURE     wisdom teeth         Review of Systems   Musculoskeletal: Positive for myalgias.   Neurological: Negative for sensory change and focal weakness.   All other systems reviewed and are negative.        Physical Exam  /80  Ht 5' 6\" (1.676 m)  Wt 202 lb 12.8 oz (92 kg)  BMI 32.73 kg/m2  Constitutional:well-developed, well-nourished, and in no distress.   Cardiovascular: Intact distal pulses.    Neurological: alert. Gait normal.   Skin: Skin is warm and dry.   Psychiatric: Mood and affect normal.   Respiratory: unlabored, speaks in full sentences  Lymph: no LAD, no lymphangitis      Back Exam   Sensation: Normal.  Gait: Normal.    Tenderness   The patient is experiencing tenderness in the lumbar.    Range of Motion   Flexion:                    Abnormal  Extension:                Normal  Lateral Bend Left:    Normal  Lateral Bend Right:  Normal  Rotation Right:         Normal  Rotation Left:           Normal  SLR    Right: Negative  Left:    Negative    Muscle Strength   Normal back strength    Tests   Toe Walk: n/t  Heel Walk: n/t    Reflexes   Patellar:  Normal  Achilles:  Normal    Comments:  FROM, pain at end range of flexion, mild    Tenderness to palpation  RIGHT " lumbar area only        MR LUMBAR SPINE WITHOUT CONTRAST August 11, 2017 8:27 AM      HISTORY: Eight months low back pain, not resolving with medication and  physical therapy, evaluate for discogenic injury. Low back pain. Left  leg pain.     TECHNIQUE: Sagittal T1 and STIR. Sagittal T2 and axial dual-echo T2.      FINDINGS: Five lumbar vertebrae are assumed. Disc dehydration and  discogenic marrow change at L4-L5. Disc dehydration and high signal  posterior annular fissuring at L5-S1. There is a greater percentage of  red marrow than typically seen. This can be seen as a normal variant,  but also in association with various forms of anemia and clinical  correlation (including CBC) is recommended.      Findings by specific level:     There is minimal to mild multilevel facet degenerative change.     T12-L1, L1-L2, L2-L3, L3-L4: No disc herniation or stenosis.     L4-L5: Disc bulging without central stenosis. Mild bilateral foraminal  stenosis.     L5-S1: Disc bulging without central stenosis. The neural foramina  appear adequate.         IMPRESSION:  1. Disc bulging at L4-L5 and L5-S1, without central stenosis or  high-grade foraminal stenosis.  2. Nonspecific red marrow pattern, as above.     JOHANA ADAMS MD        ICD-10-CM    1. Acute right-sided low back pain without sciatica M54.5 diclofenac (VOLTAREN) 75 MG EC tablet   2. DDD (degenerative disc disease), lumbar M51.36 diclofenac (VOLTAREN) 75 MG EC tablet       Mild acute low back pain, not exactly the same type of pain as before.     Nature of nonradicular low back pain discussed at length. Elaborated on role of activity rather than inactivity to minimize symptom duration. Short term pain relief symptoms discussed and side effects of various options reviewed. Emphasized long term management with strengthening and stabilization. Other options including chiropractic and physical therapy explored.    declines return to PHYSICAL THERAPY. will resume home  exercises. Ok for trial of voltaren.

## 2018-02-20 ENCOUNTER — OFFICE VISIT (OUTPATIENT)
Dept: FAMILY MEDICINE | Facility: CLINIC | Age: 43
End: 2018-02-20
Payer: OTHER GOVERNMENT

## 2018-02-20 DIAGNOSIS — L65.9 HAIR LOSS: Primary | ICD-10-CM

## 2018-02-20 LAB
BASOPHILS # BLD AUTO: 0 10E9/L (ref 0–0.2)
BASOPHILS NFR BLD AUTO: 0.5 %
DIFFERENTIAL METHOD BLD: ABNORMAL
EOSINOPHIL # BLD AUTO: 0.2 10E9/L (ref 0–0.7)
EOSINOPHIL NFR BLD AUTO: 2.6 %
ERYTHROCYTE [DISTWIDTH] IN BLOOD BY AUTOMATED COUNT: 17.4 % (ref 10–15)
HCT VFR BLD AUTO: 35.6 % (ref 35–47)
HGB BLD-MCNC: 11.7 G/DL (ref 11.7–15.7)
LYMPHOCYTES # BLD AUTO: 2.3 10E9/L (ref 0.8–5.3)
LYMPHOCYTES NFR BLD AUTO: 40.2 %
MCH RBC QN AUTO: 24.1 PG (ref 26.5–33)
MCHC RBC AUTO-ENTMCNC: 32.9 G/DL (ref 31.5–36.5)
MCV RBC AUTO: 73 FL (ref 78–100)
MONOCYTES # BLD AUTO: 0.3 10E9/L (ref 0–1.3)
MONOCYTES NFR BLD AUTO: 5.8 %
NEUTROPHILS # BLD AUTO: 2.9 10E9/L (ref 1.6–8.3)
NEUTROPHILS NFR BLD AUTO: 50.9 %
PLATELET # BLD AUTO: 275 10E9/L (ref 150–450)
RBC # BLD AUTO: 4.86 10E12/L (ref 3.8–5.2)
WBC # BLD AUTO: 5.7 10E9/L (ref 4–11)

## 2018-02-20 PROCEDURE — 99214 OFFICE O/P EST MOD 30 MIN: CPT | Mod: 25 | Performed by: FAMILY MEDICINE

## 2018-02-20 PROCEDURE — 88305 TISSUE EXAM BY PATHOLOGIST: CPT | Mod: 90 | Performed by: FAMILY MEDICINE

## 2018-02-20 PROCEDURE — 83550 IRON BINDING TEST: CPT | Performed by: FAMILY MEDICINE

## 2018-02-20 PROCEDURE — 86038 ANTINUCLEAR ANTIBODIES: CPT | Performed by: FAMILY MEDICINE

## 2018-02-20 PROCEDURE — 36415 COLL VENOUS BLD VENIPUNCTURE: CPT | Performed by: FAMILY MEDICINE

## 2018-02-20 PROCEDURE — 11101 HC BIOPSY SKIN/SUBQ/MUC MEM, SINGLE LESION: CPT | Performed by: FAMILY MEDICINE

## 2018-02-20 PROCEDURE — 99000 SPECIMEN HANDLING OFFICE-LAB: CPT | Performed by: FAMILY MEDICINE

## 2018-02-20 PROCEDURE — 11100 HC BIOPSY SKIN/SUBQ/MUC MEM, EACH ADDTL LESION: CPT | Performed by: FAMILY MEDICINE

## 2018-02-20 PROCEDURE — 80050 GENERAL HEALTH PANEL: CPT | Performed by: FAMILY MEDICINE

## 2018-02-20 PROCEDURE — 82728 ASSAY OF FERRITIN: CPT | Performed by: FAMILY MEDICINE

## 2018-02-20 PROCEDURE — 83540 ASSAY OF IRON: CPT | Performed by: FAMILY MEDICINE

## 2018-02-20 NOTE — LETTER
2/20/2018         RE: Melissa Painter  [address]  [place] [address]        Dear Colleague,    Thank you for referring your patient, Melissa Painter, to the [facility] [place]. Please see a copy of my visit note below.    [facility] - PRIMARY CARE SKIN    CC : Hair loss   SUBJECTIVE:                                                    Melissa Painter is a 42 year old female who presents to clinic today because of hair loss on the scalp.    Episodes of painful bumps on the scalp first occurred 4 years ago, recurring without any noticeable pattern every 4-6 months. A bald spot has been present for 3+ years at site of itchy bump.    Onset : 4 years ago.  Symptoms include : Scalp itchiness and bumps. Itchiness waxes and wanes. She reports pus discharge after scratching at bumps on the scalp.    She denies any other areas of involvement on the face nor arms.    Tension hair styles : NO.  Excessive weight loss : NO.  Recent serious medical illness : NO.    Products used : Oils used on scalp. Tea tree oil on scalp.    Personal Medical History  Skin Cancer : NO  Eczema Psoriasis Rosacea Autoimmune   NO NO NO NO     Family Medical History  Connective tissue disease : NO  Thyroid disease : NO  Hair Loss : NO  Skin Cancer : NO  Eczema Psoriasis Rosacea Autoimmune   ?YES  NO NO NO     Occupation : CNA in a hospital (indoor).    Patient Active Problem List   Diagnosis     Iron deficiency anemia, unspecified iron deficiency     Chronic fatigue     Vitamin D deficiency     Hair loss     Immunity status testing     Excessive or frequent menstruation     CARDIOVASCULAR SCREENING; LDL GOAL LESS THAN 160     Seasonal allergic rhinitis     Rectocele     Cystocele, midline     Urgency-frequency syndrome     Cervicalgia     Acute bilateral low back pain without sciatica     Hepatitis B carrier (H)     Acute right-sided low back pain without sciatica       Past Medical History:   Diagnosis Date     Anemia      Chronic  fatigue 4/7/2016     Urgency-frequency syndrome     Past Surgical History:   Procedure Laterality Date     AS HYSTEROS W PERMANENT FALLOPAIN IMPLANT  2013    ESSURE     wisdom teeth        Social History   Substance Use Topics     Smoking status: Never Smoker     Smokeless tobacco: Never Used     Alcohol use 0.0 oz/week     0 Standard drinks or equivalent per week      Comment: 2-3 glasses wine/yr    Family History     Problem (# of Occurrences) Relation (Name,Age of Onset)    Hypertension (1) Mother           Prescription Medications as of 2/20/2018             diclofenac (VOLTAREN) 75 MG EC tablet Take 1 tablet (75 mg) by mouth 2 times daily as needed for moderate pain    CALCIUM 1000 + D 1000-800 MG-UNIT TABS TAKE ONE TABLET BY MOUTH EVERY DAY WITH FOOD FOR BONE HEALTH AND VITAMIN D SUPPLEMENTATION    Prenatal Vit-Fe Fumarate-FA (PRENATAL LOW IRON) 27-1 MG TABS Take 1 tablet by mouth daily INDICATION: VITAMIN SUPPLEMENTATION    ferrous fumarate 65 mg, Mentasta. FE,-Vitamin C 125 mg (VITRON C)  MG TABS tablet Take 1 tablet by mouth every morning (before breakfast) INDICATION: IRON SUPPLEMENT    cholecalciferol 5000 UNITS CAPS Take 1 capsule (5,000 Units) by mouth daily INDICATION: LOW VITAMIN D, TAKE WITH FOOD    fluticasone (FLONASE) 50 MCG/ACT nasal spray Spray 2 sprays in nostril At Bedtime INDICATION: TO CONTROL NASAL ALLERGY SYMPTOMS    cetirizine (ZYRTEC) 10 MG tablet TAKE ONE TABLET BY MOUTH EVERY DAY TO TREAT NASAL ALLERGIES    IBUPROFEN PO             Allergies   Allergen Reactions     Levaquin [Levofloxacin] Itching     Influenza Vaccine Live         INTEGUMENTARY/SKIN: POSITIVE for hair loss  ROS : 14 point review of systems was negative except the symptoms listed above in the HPI.    This document serves as a record of the services and decisions personally performed and made by Norma Jacobson MD. It was created on her behalf by Greg Marie, a trained medical scribe.  The creation of this document is  based on the scribe's personal observations and the provider's statements to the medical scribe.  Greg Marie, February 20, 2018 3:04 PM      OBJECTIVE:                                                    GENERAL: healthy, alert and no distress  SKIN: Siddiqi Skin Type - VI.  Scalp and Face were examined. The dermatoscope was used to help evaluate pigmented lesions.  Skin Pertinent Findings:  Pattern of loss : patchy.    Crown of scalp : 10 cm x 4 cm area of alopecia. Some scattered patches of hair within the area of alopecia. Some scarring of tissue. Some induration. No erythema.     Left parietal scalp : 4 cm x 3 cm patch of hair loss.    Diagnostic Test Results:  No results found for this or any previous visit (from the past 24 hour(s)).  Labs Pending.    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      ASSESSMENT:                                                      Encounter Diagnosis   Name Primary?     Hair loss Yes         PLAN:                                                    Patient Instructions   FUTURE APPOINTMENTS  Follow up in 7-10 day(s) for Suture Removal and Tissue Pathology with Dr. Jacobson.    SCALP POST-TREATMENT CARE INSTRUCTIONS  Do not go swimming, until the wound is healed.    However, showering is encouraged. The next time you shower, remove the dressing and clean the area with soap and water. Neither soap, water nor shampoo will hurt the surgical area. Dry the area well with a towel or hairdryer and then apply a small amount of Vaseline over the wound. Then, cover again with a new dressing.    Signs of Infection:  Infection can occur in any area where skin has been disrupted.  If you notice persistent redness, swelling, colored drainage, increasing pain, fever or other signs of infection, please call us at: [phone] and ask to have me or my colleague paged. We will call you back to discuss.    Pathology Results:  You will be notified, generally via  letter or MyChart, in approximately 10 days. If there is anything we need to discuss or further treatment needed, I will call you to discuss it.        PROCEDURES:                                                    Name : Punch biopsy  Indication : Submission to pathology for evaluation of tissue.  Location(s) : crown of scalp.  Completed by : Norma Jacobson MD  Photo Taken : no.  Anesthesia : Patient was anesthetized by infiltrating the area surrounding the lesion with 1% lidocaine.   epinephrine [id number] : Yes.  Buffered with bicarbonate : Yes.  Note : Discussed the risk of pain, infection, scarring, hypo- or hyperpigmentation and recurrence or need for re-treatment. The benefits of treatment and alternative treatments were also discussed.    During this procedure, the universal protocol was utilized. The patient's identity was confirmed by no less than two patient identifiers, correct procedure was verified, correct site was verified and marked as applicable and a final pause was completed.    Sterile technique was used throughout the procedure. The skin was cleaned and prepped with surgical cleanser. Once adequate anesthesia was obtained, two 4 mm punch tools were used with appropriate skin traction. The specimen was sent to pathology.    Direct pressure  was applied for hemostasis. The skin was closed with 3-0 Prolene interrupted simple stitch. No bleeding was present upon the completion of the procedure. The wound was coated with antibacterial ointment. A dry sterile dressing was applied.  Total number of stitches in closure of epidermis : 2, 3    Primary provider and referring provider will be informed regarding the wound culture and tissue sample report when it returns.    Suture removal : 7-10 days.      The information in this document, created by the medical scribe for me, accurately reflects the services I personally performed and the decisions made by me. I have reviewed and approved this document for  accuracy prior to leaving the patient care area.  Norma Jacobson MD February 20, 2018 3:04 PM  [place]    Again, thank you for allowing me to participate in the care of your patient.        Sincerely,        Courtney Jacobson MD

## 2018-02-20 NOTE — MR AVS SNAPSHOT
After Visit Summary   2/20/2018    Melissa Painter    MRN: [id number]           Patient Information     Date Of Birth          1975        Visit Information        Provider Department      2/20/2018 3:40 PM Courtney Jacobson MD [facility]        Today's Diagnoses     Hair loss    -  1      Care Instructions    FUTURE APPOINTMENTS  Follow up in 7-10 day(s) for Suture Removal and Tissue Pathology with Dr. Jacobson.    SCALP POST-TREATMENT CARE INSTRUCTIONS  Do not go swimming, until the wound is healed.    However, showering is encouraged. The next time you shower, remove the dressing and clean the area with soap and water. Neither soap, water nor shampoo will hurt the surgical area. Dry the area well with a towel or hairdryer and then apply a small amount of Vaseline over the wound. Then, cover again with a new dressing.    Signs of Infection:  Infection can occur in any area where skin has been disrupted.  If you notice persistent redness, swelling, colored drainage, increasing pain, fever or other signs of infection, please call us at: [phone] and ask to have me or my colleague paged. We will call you back to discuss.    Pathology Results:  You will be notified, generally via letter or MyChart, in approximately 10 days. If there is anything we need to discuss or further treatment needed, I will call you to discuss it.          Follow-ups after your visit        Your next 10 appointments already scheduled     Feb 20, 2018  3:40 PM CST   Office Visit with Courtney Jacobson MD   [facility] ([facility])    [address] [place] [id number] [phone]           Bring a current list of meds and any records pertaining to this visit. For Physicals, please bring immunization records and any forms needing to be filled out. Please arrive 10 minutes early to complete paperwork.              Who to contact      If you have questions or need follow up information about today's clinic visit or your schedule please contact [facility] [place] directly at [phone].  Normal or non-critical lab and imaging results will be communicated to you by MyChart, letter or phone within 4 business days after the clinic has received the results. If you do not hear from us within 7 days, please contact the clinic through [employer] or phone. If you have a critical or abnormal lab result, we will notify you by phone as soon as possible.  Submit refill requests through [employer] or call your pharmacy and they will forward the refill request to us. Please allow 3 business days for your refill to be completed.          Additional Information About Your Visit        [employer] Information     [employer] gives you secure access to your electronic health record. If you see a primary care provider, you can also send messages to your care team and make appointments. If you have questions, please call your primary care clinic.  If you do not have a primary care provider, please call [phone] and they will assist you.        Care EveryWhere ID     This is your Care EveryWhere ID. This could be used by other organizations to access your [place] medical records  [id number]         Blood Pressure from Last 3 Encounters:   12/19/17 117/80   10/24/17 103/79   08/25/17 110/82    Weight from Last 3 Encounters:   12/19/17 202 lb 12.8 oz (92 kg)   10/24/17 201 lb (91.2 kg)   08/25/17 203 lb (92.1 kg)              Today, you had the following     No orders found for display       Primary Care Provider Office Phone # Fax #    Kaci Francisco [phone]       XXX RESIGNED XXX  [facility] [address]        Equal Access to Services     BERT THIBODEAUX : Hadisrrael Viera, waricardo coleman, qaybta mamadoualprakash segovia. So [facility] [phone].    ATENCIÓN: Si maynor esparleth, nydia dougherty edwards  disposición servicios gratuitos de asistencia lingüística. Vel lawson [phone].    We comply with applicable federal civil rights laws and [place] laws. We do not discriminate on the basis of race, color, national origin, age, disability, sex, sexual orientation, or gender identity.            Thank you!     Thank you for choosing [facility] [place]  for your care. Our goal is always to provide you with excellent care. Hearing back from our patients is one way we can continue to improve our services. Please take a few minutes to complete the written survey that you may receive in the mail after your visit with us. Thank you!             Your Updated Medication List - Protect others around you: Learn how to safely use, store and throw away your medicines at www.disposemymeds.org.          This list is accurate as of 2/20/18  3:31 PM.  Always use your most recent med list.                   Brand Name Dispense Instructions for use Diagnosis    CALCIUM 1000 + D 1000-800 MG-UNIT Tabs   Generic drug:  Calcium Carb-Cholecalciferol     100 tablet    TAKE ONE TABLET BY MOUTH EVERY DAY WITH FOOD FOR BONE HEALTH AND VITAMIN D SUPPLEMENTATION    Chronic fatigue, Vitamin D deficiency       cetirizine 10 MG tablet    zyrTEC    90 tablet    TAKE ONE TABLET BY MOUTH EVERY DAY TO TREAT NASAL ALLERGIES    Seasonal allergic rhinitis       cholecalciferol 5000 UNITS Caps     90 capsule    Take 1 capsule (5,000 Units) by mouth daily INDICATION: LOW VITAMIN D, TAKE WITH FOOD    Chronic fatigue       diclofenac 75 MG EC tablet    VOLTAREN    30 tablet    Take 1 tablet (75 mg) by mouth 2 times daily as needed for moderate pain    Acute right-sided low back pain without sciatica, DDD (degenerative disc disease), lumbar       ferrous fumarate 65 mg (Te-Moak. FE)-Vitamin C 125 mg  MG Tabs tablet    VITRON C    100 tablet    Take 1 tablet by mouth every morning (before breakfast) INDICATION: IRON SUPPLEMENT    Chronic  fatigue, Iron deficiency anemia, unspecified iron deficiency anemia type       fluticasone 50 MCG/ACT spray    FLONASE    16 g    Spray 2 sprays in nostril At Bedtime INDICATION: TO CONTROL NASAL ALLERGY SYMPTOMS    Seasonal allergic rhinitis       IBUPROFEN PO           PRENATAL LOW IRON 27-1 MG Tabs     90 tablet    Take 1 tablet by mouth daily INDICATION: VITAMIN SUPPLEMENTATION    Iron deficiency anemia, unspecified iron deficiency anemia type, Menorrhagia with regular cycle

## 2018-02-20 NOTE — NURSING NOTE
"Chief Complaint   Patient presents with     New Patient     Derm Problem     Scalp lesions        Initial There were no vitals taken for this visit. Estimated body mass index is 32.73 kg/(m^2) as calculated from the following:    Height as of 12/19/17: 5' 6\" (1.676 m).    Weight as of 12/19/17: 202 lb 12.8 oz (92 kg).  Medication Reconciliation: complete     Pat Earl MA     "

## 2018-02-20 NOTE — PATIENT INSTRUCTIONS
FUTURE APPOINTMENTS  Follow up in 7-10 day(s) for Suture Removal and Tissue Pathology with Dr. Jacobson.    SCALP POST-TREATMENT CARE INSTRUCTIONS  Do not go swimming, until the wound is healed.    However, showering is encouraged. The next time you shower, remove the dressing and clean the area with soap and water. Neither soap, water nor shampoo will hurt the surgical area. Dry the area well with a towel or hairdryer and then apply a small amount of Vaseline over the wound. Then, cover again with a new dressing.    Signs of Infection:  Infection can occur in any area where skin has been disrupted.  If you notice persistent redness, swelling, colored drainage, increasing pain, fever or other signs of infection, please call us at: [phone] and ask to have me or my colleague paged. We will call you back to discuss.    Pathology Results:  You will be notified, generally via letter or MyChart, in approximately 10 days. If there is anything we need to discuss or further treatment needed, I will call you to discuss it.

## 2018-02-21 LAB
ALBUMIN SERPL-MCNC: 3.7 G/DL (ref 3.4–5)
ALP SERPL-CCNC: 54 U/L (ref 40–150)
ALT SERPL W P-5'-P-CCNC: 36 U/L (ref 0–50)
ANION GAP SERPL CALCULATED.3IONS-SCNC: 8 MMOL/L (ref 3–14)
AST SERPL W P-5'-P-CCNC: 27 U/L (ref 0–45)
BILIRUB SERPL-MCNC: 0.6 MG/DL (ref 0.2–1.3)
BUN SERPL-MCNC: 9 MG/DL (ref 7–30)
CALCIUM SERPL-MCNC: 9.2 MG/DL (ref 8.5–10.1)
CHLORIDE SERPL-SCNC: 102 MMOL/L (ref 94–109)
CO2 SERPL-SCNC: 24 MMOL/L (ref 20–32)
CREAT SERPL-MCNC: 0.71 MG/DL (ref 0.52–1.04)
FERRITIN SERPL-MCNC: 14 NG/ML (ref 12–150)
GFR SERPL CREATININE-BSD FRML MDRD: >90 ML/MIN/1.7M2
GLUCOSE SERPL-MCNC: 89 MG/DL (ref 70–99)
IRON SATN MFR SERPL: 88 % (ref 15–46)
IRON SERPL-MCNC: 231 UG/DL (ref 35–180)
POTASSIUM SERPL-SCNC: 3.7 MMOL/L (ref 3.4–5.3)
PROT SERPL-MCNC: 7.6 G/DL (ref 6.8–8.8)
SODIUM SERPL-SCNC: 134 MMOL/L (ref 133–144)
TIBC SERPL-MCNC: 262 UG/DL (ref 240–430)
TSH SERPL DL<=0.005 MIU/L-ACNC: 0.7 MU/L (ref 0.4–4)

## 2018-02-22 LAB — ANA SER QL IF: NEGATIVE

## 2018-02-27 ENCOUNTER — OFFICE VISIT (OUTPATIENT)
Dept: FAMILY MEDICINE | Facility: CLINIC | Age: 43
End: 2018-02-27
Payer: OTHER GOVERNMENT

## 2018-02-27 DIAGNOSIS — L66.10 LICHEN PLANOPILARIS: Primary | ICD-10-CM

## 2018-02-27 PROCEDURE — 11900 INJECT SKIN LESIONS </W 7: CPT | Performed by: FAMILY MEDICINE

## 2018-02-27 RX ORDER — CLOBETASOL PROPIONATE 0.5 MG/ML
SOLUTION TOPICAL AT BEDTIME
Qty: 50 ML | Refills: 1 | Status: SHIPPED | OUTPATIENT
Start: 2018-02-27 | End: 2023-12-14

## 2018-02-27 NOTE — PATIENT INSTRUCTIONS
FUTURE APPOINTMENTS  Follow up every 4-6 weeks for re-treatment.    TOPICAL STEROID INSTRUCTIONS  Clobetasol propionate 0.05% solution.    Apply a few drops and rub into the affected areas on the scalp nightly at bedtime.    Not to be used on face or groin.    You may continue using current shampoos and conditioners, but do not use immediately after applying topical steroid.

## 2018-02-27 NOTE — MR AVS SNAPSHOT
After Visit Summary   2/27/2018    Melissa Painter    MRN: [id number]           Patient Information     Date Of Birth          1975        Visit Information        Provider Department      2/27/2018 10:00 AM [facility]        Today's Diagnoses     Lichen planopilaris    -  1      Care Instructions    FUTURE APPOINTMENTS  Follow up every 4-6 weeks for re-treatment.    TOPICAL STEROID INSTRUCTIONS  Clobetasol propionate 0.05% solution.    Apply a few drops and rub into the affected areas on the scalp nightly at bedtime.    Not to be used on face or groin.    You may continue using current shampoos and conditioners, but do not use immediately after applying topical steroid.          Follow-ups after your visit        Who to contact     If you have questions or need follow up information about today's clinic visit or your schedule please contact [facility] directly at [phone].  Normal or non-critical lab and imaging results will be communicated to you by [employer], letter or phone within 4 business days after the clinic has received the results. If you do not hear from us within 7 days, please contact the clinic through [employer] or phone. If you have a critical or abnormal lab result, we will notify you by phone as soon as possible.  Submit refill requests through [employer] or call your pharmacy and they will forward the refill request to us. Please allow 3 business days for your refill to be completed.          Additional Information About Your Visit        MyChart Information     [employer] gives you secure access to your electronic health record. If you see a primary care provider, you can also send messages to your care team and make appointments. If you have questions, please call your primary care clinic.  If you do not have a primary care provider, please call [phone] and they will assist you.        Care EveryWhere ID      This is your Care EveryWhere ID. This could be used by other organizations to access your [place] medical records  [id number]         Blood Pressure from Last 3 Encounters:   12/19/17 117/80   10/24/17 103/79   08/25/17 110/82    Weight from Last 3 Encounters:   12/19/17 202 lb 12.8 oz (92 kg)   10/24/17 201 lb (91.2 kg)   08/25/17 203 lb (92.1 kg)              Today, you had the following     No orders found for display       Primary Care Provider Office Phone # Fax #    Kaci Francisco [phone]       XXX RESIGNED XXX  [facility] [id number]        Equal Access to Services     PENNY THIBODEAUX : Hadii raleigh Viera, wajasminda virgin[place], qaybta kaalmada nicho, prakash munoz . So [facility] [phone].    ATENCIÓN: Si habla español, tiene a edwards disposición servicios gratuitos de asistencia lingüística. [facility] [phone].    We comply with applicable federal civil rights laws and [place] laws. We do not discriminate on the basis of race, color, national origin, age, disability, sex, sexual orientation, or gender identity.            Thank you!     Thank you for choosing [facility] [place]  for your care. Our goal is always to provide you with excellent care. Hearing back from our patients is one way we can continue to improve our services. Please take a few minutes to complete the written survey that you may receive in the mail after your visit with us. Thank you!             Your Updated Medication List - Protect others around you: Learn how to safely use, store and throw away your medicines at www.disposemymeds.org.          This list is accurate as of 2/27/18 10:21 AM.  Always use your most recent med list.                   Brand Name Dispense Instructions for use Diagnosis    CALCIUM 1000 + D 1000-800 MG-UNIT Tabs   Generic drug:  Calcium Carb-Cholecalciferol     100 tablet    TAKE ONE TABLET BY MOUTH EVERY DAY WITH FOOD FOR BONE HEALTH AND  VITAMIN D SUPPLEMENTATION    Chronic fatigue, Vitamin D deficiency       cetirizine 10 MG tablet    zyrTEC    90 tablet    TAKE ONE TABLET BY MOUTH EVERY DAY TO TREAT NASAL ALLERGIES    Seasonal allergic rhinitis       cholecalciferol 5000 UNITS Caps     90 capsule    Take 1 capsule (5,000 Units) by mouth daily INDICATION: LOW VITAMIN D, TAKE WITH FOOD    Chronic fatigue       diclofenac 75 MG EC tablet    VOLTAREN    30 tablet    Take 1 tablet (75 mg) by mouth 2 times daily as needed for moderate pain    Acute right-sided low back pain without sciatica, DDD (degenerative disc disease), lumbar       ferrous fumarate 65 mg ([place]. FE)-Vitamin C 125 mg  MG Tabs tablet    VITRON C    100 tablet    Take 1 tablet by mouth every morning (before breakfast) INDICATION: IRON SUPPLEMENT    Chronic fatigue, Iron deficiency anemia, unspecified iron deficiency anemia type       fluticasone 50 MCG/ACT spray    FLONASE    16 g    Spray 2 sprays in nostril At Bedtime INDICATION: TO CONTROL NASAL ALLERGY SYMPTOMS    Seasonal allergic rhinitis       IBUPROFEN PO           PRENATAL LOW IRON 27-1 MG Tabs     90 tablet    Take 1 tablet by mouth daily INDICATION: VITAMIN SUPPLEMENTATION    Iron deficiency anemia, unspecified iron deficiency anemia type, Menorrhagia with regular cycle

## 2018-02-27 NOTE — PROGRESS NOTES
[facility] - PRIMARY CARE SKIN    CC : Hair loss   SUBJECTIVE:                                                    Melissa Painter is a 43 year old female who presents to clinic today for follow-up of hair loss on the scalp. Hair loss has been associated with painful bumps on the scalp, first occurring 4 years ago, recurring without any noticeable pattern every 4-6 months.    Tissue Pathology Report from 2/20/2018       Onset : 4 years ago.  Symptoms include : Scalp itchiness and bumps. Itchiness waxes and wanes. She reports pus discharge after scratching at bumps on the scalp.    Tension hair styles : YES    Personal Medical History  Skin Cancer : NO  Eczema Psoriasis Rosacea Autoimmune   NO NO NO NO     Family Medical History  Connective tissue disease : NO  Thyroid disease : NO  Hair Loss : NO  Skin Cancer : NO  Eczema Psoriasis Rosacea Autoimmune   ?YES  NO NO NO   No family history of sickle cell disease.    Occupation : CNA in a hospital (indoor).    Patient Active Problem List   Diagnosis     Iron deficiency anemia, unspecified iron deficiency     Chronic fatigue     Vitamin D deficiency     Hair loss     Immunity status testing     Excessive or frequent menstruation     CARDIOVASCULAR SCREENING; LDL GOAL LESS THAN 160     Seasonal allergic rhinitis     Rectocele     Cystocele, midline     Urgency-frequency syndrome     Cervicalgia     Acute bilateral low back pain without sciatica     Hepatitis B carrier (H)     Acute right-sided low back pain without sciatica       Past Medical History:   Diagnosis Date     Anemia      Chronic fatigue 4/7/2016     Urgency-frequency syndrome     Past Surgical History:   Procedure Laterality Date     AS HYSTEROS W PERMANENT FALLOPAIN IMPLANT  2013    ESSURE     wisdom teeth        Social History   Substance Use Topics     Smoking status: Never Smoker     Smokeless tobacco: Never Used     Alcohol use 0.0 oz/week     0 Standard drinks or equivalent per week      Comment:  2-3 glasses wine/yr    Family History     Problem (# of Occurrences) Relation (Name,Age of Onset)    Hypertension (1) Mother           Prescription Medications as of 2/27/2018             diclofenac (VOLTAREN) 75 MG EC tablet Take 1 tablet (75 mg) by mouth 2 times daily as needed for moderate pain    CALCIUM 1000 + D 1000-800 MG-UNIT TABS TAKE ONE TABLET BY MOUTH EVERY DAY WITH FOOD FOR BONE HEALTH AND VITAMIN D SUPPLEMENTATION    Prenatal Vit-Fe Fumarate-FA (PRENATAL LOW IRON) 27-1 MG TABS Take 1 tablet by mouth daily INDICATION: VITAMIN SUPPLEMENTATION    ferrous fumarate 65 mg, [place]. FE,-Vitamin C 125 mg (VITRON C)  MG TABS tablet Take 1 tablet by mouth every morning (before breakfast) INDICATION: IRON SUPPLEMENT    cholecalciferol 5000 UNITS CAPS Take 1 capsule (5,000 Units) by mouth daily INDICATION: LOW VITAMIN D, TAKE WITH FOOD    cetirizine (ZYRTEC) 10 MG tablet TAKE ONE TABLET BY MOUTH EVERY DAY TO TREAT NASAL ALLERGIES    IBUPROFEN PO     fluticasone (FLONASE) 50 MCG/ACT nasal spray Spray 2 sprays in nostril At Bedtime INDICATION: TO CONTROL NASAL ALLERGY SYMPTOMS            Allergies   Allergen Reactions     Levaquin [Levofloxacin] Itching     Influenza Vaccine Live         INTEGUMENTARY/SKIN: POSITIVE for hair loss  ROS : 14 point review of systems was negative except the symptoms listed above in the HPI.    This document serves as a record of the services and decisions personally performed and made by Norma Jacobson MD. It was created on her behalf by Greg Marie, a trained medical scribe.  The creation of this document is based on the scribe's personal observations and the provider's statements to the medical scribe.  Greg Marie, February 27, 2018 9:57 AM      OBJECTIVE:                                                    GENERAL: healthy, alert and no distress  SKIN: Siddiqi Skin Type - VI.  Scalp and Face were examined. The dermatoscope was used to help evaluate pigmented lesions.  Skin  Pertinent Findings:  Pattern of loss : patchy.    Crown of scalp : 10 cm x 4 cm area of alopecia. Some scattered patches of hair within the area of alopecia. Some scarring of tissue. Some induration. No erythema.     Left parietal scalp : 4 cm x 3 cm patch of hair loss.  Name : Triamcinolone acetonide (Kenalog) injection.  Indication : lichen planopilaris  Location(s) : scalp.  Completed by : Norma Jacobson MD  Note : Prior to the procedure, we discussed possible hypo- and hyperpigmentation or depression of the skin post-procedure. Explained that more then one injection may be required, that these injections are distributed 4-6 weeks apart, and that up to a total of six injections may be needed.    Skin was cleansed with alcohol. 1.0 mL triamcinolone acetonide 10 mg/mL drawn up.   Injected in 0.1 mL aliquots in the area of alopecia.  Total injected :   Crown of scalp, left parietal scalp : 1.0 mL = 10 mg  Lot # : [id number]. Expiration Date : Mar 2019.    Blanching was present during the injection. Minimal bleeding occurred. Patient left in satisfactory condition.  Treatment number : 1.    MDM : discussed treatment options for lichen planopilaris, expectations of treatment to stop current inflammatory process so less scarring alopecia.      ASSESSMENT:                                                      Encounter Diagnosis   Name Primary?     Lichen planopilaris Yes         PLAN:                                                    Patient Instructions   FUTURE APPOINTMENTS  Follow up every 4-6 weeks for re-treatment.    TOPICAL STEROID INSTRUCTIONS  Clobetasol propionate 0.05% solution.    Apply a few drops and rub into the affected areas on the scalp nightly at bedtime.    Not to be used on face or groin.    You may continue using current shampoos and conditioners, but do not use immediately after applying topical steroid.        PROCEDURES:                                                    None.    TT: 20  minutes.  CT: 15 minutes.      The information in this document, created by the medical scribe for me, accurately reflects the services I personally performed and the decisions made by me. I have reviewed and approved this document for accuracy prior to leaving the patient care area.  Norma Jacobson MD February 27, 2018 9:57 AM  [facility] [place]

## 2018-02-27 NOTE — LETTER
2/27/2018         RE: Melissa Painter  [address]  [place] [address]        Dear Colleague,    Thank you for referring your patient, Melissa Painter, to the [facility]. Please see a copy of my visit note below.    [facility] - PRIMARY CARE SKIN    CC : Hair loss   SUBJECTIVE:                                                    Melissa Painter is a 43 year old female who presents to clinic today for follow-up of hair loss on the scalp. Hair loss has been associated with painful bumps on the scalp, first occurring 4 years ago, recurring without any noticeable pattern every 4-6 months.    Tissue Pathology Report from 2/20/2018       Onset : 4 years ago.  Symptoms include : Scalp itchiness and bumps. Itchiness waxes and wanes. She reports pus discharge after scratching at bumps on the scalp.    Tension hair styles : YES    Personal Medical History  Skin Cancer : NO  Eczema Psoriasis Rosacea Autoimmune   NO NO NO NO     Family Medical History  Connective tissue disease : NO  Thyroid disease : NO  Hair Loss : NO  Skin Cancer : NO  Eczema Psoriasis Rosacea Autoimmune   ?YES  NO NO NO   No family history of sickle cell disease.    Occupation : CNA in a hospital (indoor).    Patient Active Problem List   Diagnosis     Iron deficiency anemia, unspecified iron deficiency     Chronic fatigue     Vitamin D deficiency     Hair loss     Immunity status testing     Excessive or frequent menstruation     CARDIOVASCULAR SCREENING; LDL GOAL LESS THAN 160     Seasonal allergic rhinitis     Rectocele     Cystocele, midline     Urgency-frequency syndrome     Cervicalgia     Acute bilateral low back pain without sciatica     Hepatitis B carrier (H)     Acute right-sided low back pain without sciatica       Past Medical History:   Diagnosis Date     Anemia      Chronic fatigue 4/7/2016     Urgency-frequency syndrome     Past Surgical History:   Procedure Laterality Date     AS HYSTEROS W PERMANENT FALLOPAIN  IMPLANT  2013    ESSURE     wisdom teeth        Social History   Substance Use Topics     Smoking status: Never Smoker     Smokeless tobacco: Never Used     Alcohol use 0.0 oz/week     0 Standard drinks or equivalent per week      Comment: 2-3 glasses wine/yr    Family History     Problem (# of Occurrences) Relation (Name,Age of Onset)    Hypertension (1) Mother           Prescription Medications as of 2/27/2018             diclofenac (VOLTAREN) 75 MG EC tablet Take 1 tablet (75 mg) by mouth 2 times daily as needed for moderate pain    CALCIUM 1000 + D 1000-800 MG-UNIT TABS TAKE ONE TABLET BY MOUTH EVERY DAY WITH FOOD FOR BONE HEALTH AND VITAMIN D SUPPLEMENTATION    Prenatal Vit-Fe Fumarate-FA (PRENATAL LOW IRON) 27-1 MG TABS Take 1 tablet by mouth daily INDICATION: VITAMIN SUPPLEMENTATION    ferrous fumarate 65 mg, [place]. FE,-Vitamin C 125 mg (VITRON C)  MG TABS tablet Take 1 tablet by mouth every morning (before breakfast) INDICATION: IRON SUPPLEMENT    cholecalciferol 5000 UNITS CAPS Take 1 capsule (5,000 Units) by mouth daily INDICATION: LOW VITAMIN D, TAKE WITH FOOD    cetirizine (ZYRTEC) 10 MG tablet TAKE ONE TABLET BY MOUTH EVERY DAY TO TREAT NASAL ALLERGIES    IBUPROFEN PO     fluticasone (FLONASE) 50 MCG/ACT nasal spray Spray 2 sprays in nostril At Bedtime INDICATION: TO CONTROL NASAL ALLERGY SYMPTOMS            Allergies   Allergen Reactions     Levaquin [Levofloxacin] Itching     Influenza Vaccine Live         INTEGUMENTARY/SKIN: POSITIVE for hair loss  ROS : 14 point review of systems was negative except the symptoms listed above in the HPI.    This document serves as a record of the services and decisions personally performed and made by Norma Jacobson MD. It was created on her behalf by Greg Marie, a trained medical scribe.  The creation of this document is based on the scribe's personal observations and the provider's statements to the medical scribe.  Greg Marie, February 27, 2018 9:57  AM      OBJECTIVE:                                                    GENERAL: healthy, alert and no distress  SKIN: Siddiqi Skin Type - VI.  Scalp and Face were examined. The dermatoscope was used to help evaluate pigmented lesions.  Skin Pertinent Findings:  Pattern of loss : patchy.    Crown of scalp : 10 cm x 4 cm area of alopecia. Some scattered patches of hair within the area of alopecia. Some scarring of tissue. Some induration. No erythema.     Left parietal scalp : 4 cm x 3 cm patch of hair loss.  Name : Triamcinolone acetonide (Kenalog) injection.  Indication : lichen planopilaris  Location(s) : scalp.  Completed by : Norma Jacobson MD  Note : Prior to the procedure, we discussed possible hypo- and hyperpigmentation or depression of the skin post-procedure. Explained that more then one injection may be required, that these injections are distributed 4-6 weeks apart, and that up to a total of six injections may be needed.    Skin was cleansed with alcohol. 1.0 mL triamcinolone acetonide 10 mg/mL drawn up.   Injected in 0.1 mL aliquots in the area of alopecia.  Total injected :   Crown of scalp, left parietal scalp : 1.0 mL = 10 mg  Lot # : [id number]. Expiration Date : Mar 2019.    Blanching was present during the injection. Minimal bleeding occurred. Patient left in satisfactory condition.  Treatment number : 1.    MDM : discussed treatment options for lichen planopilaris, expectations of treatment to stop current inflammatory process so less scarring alopecia.      ASSESSMENT:                                                      Encounter Diagnosis   Name Primary?     Lichen planopilaris Yes         PLAN:                                                    Patient Instructions   FUTURE APPOINTMENTS  Follow up every 4-6 weeks for re-treatment.    TOPICAL STEROID INSTRUCTIONS  Clobetasol propionate 0.05% solution.    Apply a few drops and rub into the affected areas on the scalp nightly at bedtime.    Not  to be used on face or groin.    You may continue using current shampoos and conditioners, but do not use immediately after applying topical steroid.        PROCEDURES:                                                    None.    TT: 20 minutes.  CT: 15 minutes.      The information in this document, created by the medical scribe for me, accurately reflects the services I personally performed and the decisions made by me. I have reviewed and approved this document for accuracy prior to leaving the patient care area.  Norma Jacobson MD February 27, 2018 9:57 AM  [facility] [place]    Again, thank you for allowing me to participate in the care of your patient.        Sincerely,        Courtney Jacosbon MD

## 2018-03-17 ENCOUNTER — OFFICE VISIT (OUTPATIENT)
Dept: URGENT CARE | Facility: URGENT CARE | Age: 43
End: 2018-03-17
Payer: OTHER GOVERNMENT

## 2018-03-17 VITALS
RESPIRATION RATE: 18 BRPM | HEART RATE: 72 BPM | TEMPERATURE: 98.2 F | WEIGHT: 202 LBS | DIASTOLIC BLOOD PRESSURE: 68 MMHG | BODY MASS INDEX: 32.6 KG/M2 | SYSTOLIC BLOOD PRESSURE: 102 MMHG

## 2018-03-17 DIAGNOSIS — M54.41 ACUTE RIGHT-SIDED LOW BACK PAIN WITH RIGHT-SIDED SCIATICA: Primary | ICD-10-CM

## 2018-03-17 PROCEDURE — 99213 OFFICE O/P EST LOW 20 MIN: CPT | Performed by: FAMILY MEDICINE

## 2018-03-17 RX ORDER — METHOCARBAMOL 750 MG/1
750 TABLET, FILM COATED ORAL 3 TIMES DAILY PRN
Qty: 60 TABLET | Refills: 0 | Status: SHIPPED | OUTPATIENT
Start: 2018-03-17 | End: 2018-05-31

## 2018-03-17 RX ORDER — METHYLPREDNISOLONE 4 MG
TABLET, DOSE PACK ORAL
Qty: 21 TABLET | Refills: 0 | Status: SHIPPED | OUTPATIENT
Start: 2018-03-17 | End: 2018-04-27

## 2018-03-17 NOTE — MR AVS SNAPSHOT
"              After Visit Summary   3/17/2018    Melissa Painter    MRN: [id number]           Patient Information     Date Of Birth          1975        Visit Information        Provider Department      3/17/2018 2:05 PM Carmencita Vazquez MD [facility]        Today's Diagnoses     Acute right-sided low back pain with right-sided sciatica    -  1      Care Instructions      * Sciatica    Sciatica (\"Lumbar Radiculopathy\") causes a pain that spreads from the lower back down into the buttock, hip and leg. Sometimes leg pain can occur without any back pain. Sciatica is due to irritation or pressure on a spinal nerve as it comes out of the spinal canal. This is most often due to a bulge or rupture of a nearby spinal disk (the cartilage cushion between each spinal bone), which presses on a nearby nerve. Other causes include spinal stenosis (narrowing of the spinal canal) and spasm of the piriformis muscle (a muscle in the buttocks that the sciatic nerve passes through).  Sciatica may begin after a sudden twisting/bending force (such as in a car accident), or sometimes after a simple awkward movement. In either case, muscle spasm is commonly present and contributes to the pain.  The diagnosis of sciatica is made from the symptoms and physical exam. Unless you had a physical injury (such as a car accident or fall), X-rays are usually not ordered for the initial evaluation of sciatica because the nerves and disks cannot be seen on an X-ray. Most sciatica (80-90%) gets better with time.  What can I do about my low back pain?  There are three main things you can do to ease low back pain and help it go away.    Use heat or cold packs.    Take medicine as directed.    Use positions, movements and exercises. Stay active! Too much rest can make your symptoms worse.  Using heat or cold packs  Try cold packs or gentle heat to ease your pain. Use whichever gives the most relief. Apply the cold pack or " heat for 15 minutes at a time, as often as needed.  Taking medicine  If taking over-the-counter medicine:    Take ibuprofen (Advil, Motrin) 600 mg. three times a day as needed for pain.  OR    Take Aleve (naproxen sodium) 220 to 440 mg. two times a day as needed for pain  If your doctor prescribed a muscle relaxant (cyclobenzapine 10 mg.):    Take one half ( ) to 1 tablet at bedtime    Do not drive when taking this medicine. This drug may make you sleepy.  Using positions, movements and exercises  Research tells us that moving your joints and muscles can help you recover from back pain. Such activity should be simple and gentle.  Use the positions below as well as walking to help relieve your discomfort. Try taking a short walk every 3 to 4 hours during the day. Walk for a few minutes inside your home or take longer walks outside, on a treadmill or at a mall. Slowly increase the amount of time you walk. Expect discomfort when you begin, but it should lessen as your back starts to recover.  Finding a position that is comfortable  When your back pain is new, you may find that certain positions will ease your pain. Gently try each of the following positions until you find one that eases your pain. Once you find a position of comfort, use it as often as you like while you recover. Return to your daily routine as soon as possible.     Lie on your back with your legs bent. You can do this by placing a pillow under your knees or lie on the floor and rest your lower legs on the seat of a chair.    Lie on your side with your knees bent and place a pillow between your knees.    Lie on your stomach over pillows.  When should I call my doctor?  Your back pain should improve over the first couple of weeks. As it improves, you should be able to return to your normal activities. But call your doctor if:    You have a sudden change in your ability to control? your bladder or bowels.    You begin to feel tingling in your groin or  legs.    The pain spreads down your leg and into your foot.    Your toes, feet or leg muscles begin to feel weak.    You feel generally unwell or sick.    Your pain gets worse.    9266-5406 [employer]. [address] [place] [address], [place] [address]. All rights reserved. This information is not intended as a substitute for professional medical care. Always follow your healthcare professional's instructions.  This information has been modified by your health care provider with permission from the publisher.                Follow-ups after your visit        Your next 10 appointments already scheduled     Mar 27, 2018  9:40 AM CDT   Office Visit with Courtney Jacobson MD   [facility] ([facility])    [address] [facility] [facility] [address] [phone]           Bring a current list of meds and any records pertaining to this visit. For Physicals, please bring immunization records and any forms needing to be filled out. Please arrive 10 minutes early to complete paperwork.              Who to contact     If you have questions or need follow up information about today's clinic visit or your schedule please contact [facility] directly at [phone].  Normal or non-critical lab and imaging results will be communicated to you by MyChart, letter or phone within 4 business days after the clinic has received the results. If you do not hear from us within 7 days, please contact the clinic through [employer] or phone. If you have a critical or abnormal lab result, we will notify you by phone as soon as possible.  Submit refill requests through [employer] or call your pharmacy and they will forward the refill request to us. Please allow 3 business days for your refill to be completed.          Additional Information About Your Visit        [employer] Information     [employer] gives you secure access to your electronic health record. If you  see a primary care provider, you can also send messages to your care team and make appointments. If you have questions, please call your primary care clinic.  If you do not have a primary care provider, please call [phone] and they will assist you.        Care EveryWhere ID     This is your Care EveryWhere ID. This could be used by other organizations to access your [facility] medical records  [id number]        Your Vitals Were     Pulse Temperature Respirations BMI (Body Mass Index)          72 98.2  F (36.8  C) (Tympanic) 18 32.6 kg/m2         Blood Pressure from Last 3 Encounters:   03/17/18 102/68   12/19/17 117/80   10/24/17 103/79    Weight from Last 3 Encounters:   03/17/18 202 lb (91.6 kg)   12/19/17 202 lb 12.8 oz (92 kg)   10/24/17 201 lb (91.2 kg)              Today, you had the following     No orders found for display         Today's Medication Changes          These changes are accurate as of 3/17/18  3:01 PM.  If you have any questions, ask your nurse or doctor.               Start taking these medicines.        Dose/Directions    methocarbamol 750 MG tablet   Commonly known as:  ROBAXIN   Used for:  Acute right-sided low back pain with right-sided sciatica   Started by:  Carmencita Vazquez MD        Dose:  750 mg   Take 1 tablet (750 mg) by mouth 3 times daily as needed for muscle spasms   Quantity:  60 tablet   Refills:  0       methylPREDNISolone 4 MG tablet   Commonly known as:  MEDROL DOSEPAK   Used for:  Acute right-sided low back pain with right-sided sciatica   Started by:  Carmencita Vazquez MD        Follow package instructions   Quantity:  21 tablet   Refills:  0            Where to get your medicines      These medications were sent to [facility] [address] [address]   [address] [address]     Phone:  [phone]     methocarbamol 750 MG tablet         Some of these will need a paper prescription and others can be  bought over the counter.  Ask your nurse if you have questions.     Bring a paper prescription for each of these medications     methylPREDNISolone 4 MG tablet                Primary Care Provider Office Phone # Fax #    Kaci Francisco [phone]       XXX RESIGNED XXX  [facility] [address]        Equal Access to Services     PENNY THIBODEAUX : Hadii aad ku hadasho Soomaali, waaxda luqadaha, qaybta kaalmada adeegyada, waxarash smith hayaan aderogelio muñizbrianneabhi noguera. So [facility] [phone].    ATENCIÓN: Si habla español, tiene a edwards disposición servicios gratuitos de asistencia lingüística. Llame al [phone].    We comply with applicable federal civil rights laws and [place] laws. We do not discriminate on the basis of race, color, national origin, age, disability, sex, sexual orientation, or gender identity.            Thank you!     Thank you for choosing [facility]  for your care. Our goal is always to provide you with excellent care. Hearing back from our patients is one way we can continue to improve our services. Please take a few minutes to complete the written survey that you may receive in the mail after your visit with us. Thank you!             Your Updated Medication List - Protect others around you: Learn how to safely use, store and throw away your medicines at www.disposemymeds.org.          This list is accurate as of 3/17/18  3:01 PM.  Always use your most recent med list.                   Brand Name Dispense Instructions for use Diagnosis    CALCIUM 1000 + D 1000-800 MG-UNIT Tabs   Generic drug:  Calcium Carb-Cholecalciferol     100 tablet    TAKE ONE TABLET BY MOUTH EVERY DAY WITH FOOD FOR BONE HEALTH AND VITAMIN D SUPPLEMENTATION    Chronic fatigue, Vitamin D deficiency       cetirizine 10 MG tablet    zyrTEC    90 tablet    TAKE ONE TABLET BY MOUTH EVERY DAY TO TREAT NASAL ALLERGIES    Seasonal allergic rhinitis       cholecalciferol 5000 UNITS Caps      90 capsule    Take 1 capsule (5,000 Units) by mouth daily INDICATION: LOW VITAMIN D, TAKE WITH FOOD    Chronic fatigue       clobetasol 0.05 % external solution    TEMOVATE    50 mL    Apply topically At Bedtime and rub into affected areas of scalp. Never to be used on face nor groin.    Lichen planopilaris       diclofenac 75 MG EC tablet    VOLTAREN    30 tablet    Take 1 tablet (75 mg) by mouth 2 times daily as needed for moderate pain    Acute right-sided low back pain without sciatica, DDD (degenerative disc disease), lumbar       ferrous fumarate 65 mg ([place]. FE)-Vitamin C 125 mg  MG Tabs tablet    VITRON C    100 tablet    Take 1 tablet by mouth every morning (before breakfast) INDICATION: IRON SUPPLEMENT    Chronic fatigue, Iron deficiency anemia, unspecified iron deficiency anemia type       fluticasone 50 MCG/ACT spray    FLONASE    16 g    Spray 2 sprays in nostril At Bedtime INDICATION: TO CONTROL NASAL ALLERGY SYMPTOMS    Seasonal allergic rhinitis       IBUPROFEN PO           methocarbamol 750 MG tablet    ROBAXIN    60 tablet    Take 1 tablet (750 mg) by mouth 3 times daily as needed for muscle spasms    Acute right-sided low back pain with right-sided sciatica       methylPREDNISolone 4 MG tablet    MEDROL DOSEPAK    21 tablet    Follow package instructions    Acute right-sided low back pain with right-sided sciatica       PRENATAL LOW IRON 27-1 MG Tabs     90 tablet    Take 1 tablet by mouth daily INDICATION: VITAMIN SUPPLEMENTATION    Iron deficiency anemia, unspecified iron deficiency anemia type, Menorrhagia with regular cycle

## 2018-03-17 NOTE — LETTER
[facility]  [address] [address]  Phone: [phone]    03/17/18    Melissa Painter  [address] [place] [address]      To whom it may concern:     Melissa Painter was seen in the clinic for current illness. She needs to avoid heavy lifting for next 1 week.  Sincerely,      Carmencita Vazquez MD
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
## 2018-03-17 NOTE — PATIENT INSTRUCTIONS
"  * Sciatica    Sciatica (\"Lumbar Radiculopathy\") causes a pain that spreads from the lower back down into the buttock, hip and leg. Sometimes leg pain can occur without any back pain. Sciatica is due to irritation or pressure on a spinal nerve as it comes out of the spinal canal. This is most often due to a bulge or rupture of a nearby spinal disk (the cartilage cushion between each spinal bone), which presses on a nearby nerve. Other causes include spinal stenosis (narrowing of the spinal canal) and spasm of the piriformis muscle (a muscle in the buttocks that the sciatic nerve passes through).  Sciatica may begin after a sudden twisting/bending force (such as in a car accident), or sometimes after a simple awkward movement. In either case, muscle spasm is commonly present and contributes to the pain.  The diagnosis of sciatica is made from the symptoms and physical exam. Unless you had a physical injury (such as a car accident or fall), X-rays are usually not ordered for the initial evaluation of sciatica because the nerves and disks cannot be seen on an X-ray. Most sciatica (80-90%) gets better with time.  What can I do about my low back pain?  There are three main things you can do to ease low back pain and help it go away.    Use heat or cold packs.    Take medicine as directed.    Use positions, movements and exercises. Stay active! Too much rest can make your symptoms worse.  Using heat or cold packs  Try cold packs or gentle heat to ease your pain. Use whichever gives the most relief. Apply the cold pack or heat for 15 minutes at a time, as often as needed.  Taking medicine  If taking over-the-counter medicine:    Take ibuprofen (Advil, Motrin) 600 mg. three times a day as needed for pain.  OR    Take Aleve (naproxen sodium) 220 to 440 mg. two times a day as needed for pain  If your doctor prescribed a muscle relaxant (cyclobenzapine 10 mg.):    Take one half ( ) to 1 tablet at bedtime    Do not drive when " taking this medicine. This drug may make you sleepy.  Using positions, movements and exercises  Research tells us that moving your joints and muscles can help you recover from back pain. Such activity should be simple and gentle.  Use the positions below as well as walking to help relieve your discomfort. Try taking a short walk every 3 to 4 hours during the day. Walk for a few minutes inside your home or take longer walks outside, on a treadmill or at a mall. Slowly increase the amount of time you walk. Expect discomfort when you begin, but it should lessen as your back starts to recover.  Finding a position that is comfortable  When your back pain is new, you may find that certain positions will ease your pain. Gently try each of the following positions until you find one that eases your pain. Once you find a position of comfort, use it as often as you like while you recover. Return to your daily routine as soon as possible.     Lie on your back with your legs bent. You can do this by placing a pillow under your knees or lie on the floor and rest your lower legs on the seat of a chair.    Lie on your side with your knees bent and place a pillow between your knees.    Lie on your stomach over pillows.  When should I call my doctor?  Your back pain should improve over the first couple of weeks. As it improves, you should be able to return to your normal activities. But call your doctor if:    You have a sudden change in your ability to control? your bladder or bowels.    You begin to feel tingling in your groin or legs.    The pain spreads down your leg and into your foot.    Your toes, feet or leg muscles begin to feel weak.    You feel generally unwell or sick.    Your pain gets worse.    3739-2799 [employer]. [address] [place] [address], [place] [address]. All rights reserved. This information is not intended as a substitute for professional medical care. Always follow your healthcare professional's  instructions.  This information has been modified by your health care provider with permission from the publisher.

## 2018-03-17 NOTE — NURSING NOTE
"Chief Complaint   Patient presents with     Urgent Care     Pt c/o right hip pain       Initial /68  Pulse 72  Temp 98.2  F (36.8  C) (Tympanic)  Resp 18  Wt 202 lb (91.6 kg)  BMI 32.6 kg/m2 Estimated body mass index is 32.6 kg/(m^2) as calculated from the following:    Height as of 12/19/17: 5' 6\" (1.676 m).    Weight as of this encounter: 202 lb (91.6 kg).  Medication Reconciliation: unable or not appropriate to perform    Mj Monroy CMA  "

## 2018-03-20 ENCOUNTER — OFFICE VISIT (OUTPATIENT)
Dept: ORTHOPEDICS | Facility: CLINIC | Age: 43
End: 2018-03-20
Payer: OTHER GOVERNMENT

## 2018-03-20 VITALS
HEIGHT: 66 IN | DIASTOLIC BLOOD PRESSURE: 70 MMHG | WEIGHT: 202 LBS | SYSTOLIC BLOOD PRESSURE: 106 MMHG | BODY MASS INDEX: 32.47 KG/M2

## 2018-03-20 DIAGNOSIS — M54.50 ACUTE RIGHT-SIDED LOW BACK PAIN WITHOUT SCIATICA: Primary | ICD-10-CM

## 2018-03-20 PROCEDURE — 99213 OFFICE O/P EST LOW 20 MIN: CPT | Performed by: FAMILY MEDICINE

## 2018-03-20 ASSESSMENT — ENCOUNTER SYMPTOMS
MYALGIAS: 1
SENSORY CHANGE: 0
FOCAL WEAKNESS: 0

## 2018-03-20 NOTE — MR AVS SNAPSHOT
After Visit Summary   3/20/2018    Melissa Painter    MRN: [id number]           Patient Information     Date Of Birth          1975        Visit Information        Provider Department      3/20/2018 9:20 AM Lupillo Lenz MD [facility]        Today's Diagnoses     Acute right-sided low back pain without sciatica    -  1       Follow-ups after your visit        Your next 10 appointments already scheduled     Mar 27, 2018  9:00 AM CDT   Return Visit with Lupillo Lenz MD   [facility] ([facility])    [address] [place] [id number]   [phone]            Mar 27, 2018  9:40 AM CDT   Office Visit with Courtney Jacobson MD   [facility]    [address] [place] [id number]   [phone]           Bring a current list of meds and any records pertaining to this visit. For Physicals, please bring immunization records and any forms needing to be filled out. Please arrive 10 minutes early to complete paperwork.              Who to contact     If you have questions or need follow up information about today's clinic visit or your schedule please contact [facility] [place] [facility] directly at [phone].  Normal or non-critical lab and imaging results will be communicated to you by MyChart, letter or phone within 4 business days after the clinic has received the results. If you do not hear from us within 7 days, please contact the clinic through MyChart or phone. If you have a critical or abnormal lab result, we will notify you by phone as soon as possible.  Submit refill requests through Upptalk or call your pharmacy and they will forward the refill request to us. Please allow 3 business days for your refill to be completed.          Additional Information About Your  "Visit        MyChart Information     Chirplyhart gives you secure access to your electronic health record. If you see a primary care provider, you can also send messages to your care team and make appointments. If you have questions, please call your primary care clinic.  If you do not have a primary care provider, please call [phone] and they will assist you.        Care EveryWhere ID     This is your Care EveryWhere ID. This could be used by other organizations to access your [place] medical records  [id number]        Your Vitals Were     Height BMI (Body Mass Index)                5' 6\" (1.676 m) 32.6 kg/m2           Blood Pressure from Last 3 Encounters:   03/20/18 106/70   03/17/18 102/68   12/19/17 117/80    Weight from Last 3 Encounters:   03/20/18 202 lb (91.6 kg)   03/17/18 202 lb (91.6 kg)   12/19/17 202 lb 12.8 oz (92 kg)              Today, you had the following     No orders found for display       Primary Care Provider Office Phone # Fax #    Kaci Francisco [phone]       XXX RESIGNED XXX  [facility] [id number]        Equal Access to Services     PENNY THIBODEAUX AH: Hadii raleigh Viera, waaxda luadrianadaha, qaybta kaalmada adeericda, prakash noguera. So [facility] [phone].    ATENCIÓN: Si habla español, tiene a edwards disposición servicios gratuitos de asistencia lingüística. Llame al [phone].    We comply with applicable federal civil rights laws and [place] laws. We do not discriminate on the basis of race, color, national origin, age, disability, sex, sexual orientation, or gender identity.            Thank you!     Thank you for choosing [facility]  for your care. Our goal is always to provide you with excellent care. Hearing back from our patients is one way we can continue to improve our services. Please take a few minutes to complete the written survey that you may receive in the mail after your visit " with us. Thank you!             Your Updated Medication List - Protect others around you: Learn how to safely use, store and throw away your medicines at www.disposemymeds.org.          This list is accurate as of 3/20/18 11:22 AM.  Always use your most recent med list.                   Brand Name Dispense Instructions for use Diagnosis    CALCIUM 1000 + D 1000-800 MG-UNIT Tabs   Generic drug:  Calcium Carb-Cholecalciferol     100 tablet    TAKE ONE TABLET BY MOUTH EVERY DAY WITH FOOD FOR BONE HEALTH AND VITAMIN D SUPPLEMENTATION    Chronic fatigue, Vitamin D deficiency       cetirizine 10 MG tablet    zyrTEC    90 tablet    TAKE ONE TABLET BY MOUTH EVERY DAY TO TREAT NASAL ALLERGIES    Seasonal allergic rhinitis       cholecalciferol 5000 UNITS Caps     90 capsule    Take 1 capsule (5,000 Units) by mouth daily INDICATION: LOW VITAMIN D, TAKE WITH FOOD    Chronic fatigue       clobetasol 0.05 % external solution    TEMOVATE    50 mL    Apply topically At Bedtime and rub into affected areas of scalp. Never to be used on face nor groin.    Lichen planopilaris       diclofenac 75 MG EC tablet    VOLTAREN    30 tablet    Take 1 tablet (75 mg) by mouth 2 times daily as needed for moderate pain    Acute right-sided low back pain without sciatica, DDD (degenerative disc disease), lumbar       ferrous fumarate 65 mg ([place]. FE)-Vitamin C 125 mg  MG Tabs tablet    VITRON C    100 tablet    Take 1 tablet by mouth every morning (before breakfast) INDICATION: IRON SUPPLEMENT    Chronic fatigue, Iron deficiency anemia, unspecified iron deficiency anemia type       fluticasone 50 MCG/ACT spray    FLONASE    16 g    Spray 2 sprays in nostril At Bedtime INDICATION: TO CONTROL NASAL ALLERGY SYMPTOMS    Seasonal allergic rhinitis       IBUPROFEN PO           methocarbamol 750 MG tablet    ROBAXIN    60 tablet    Take 1 tablet (750 mg) by mouth 3 times daily as needed for muscle spasms    Acute right-sided low back pain with  right-sided sciatica       methylPREDNISolone 4 MG tablet    MEDROL DOSEPAK    21 tablet    Follow package instructions    Acute right-sided low back pain with right-sided sciatica       PRENATAL LOW IRON 27-1 MG Tabs     90 tablet    Take 1 tablet by mouth daily INDICATION: VITAMIN SUPPLEMENTATION    Iron deficiency anemia, unspecified iron deficiency anemia type, Menorrhagia with regular cycle

## 2018-03-20 NOTE — LETTER
3/20/2018         RE: Melissa Painter  [address]  [place] [address]        Dear Colleague,    Thank you for referring your patient, Melissa Painter, to the [facility]. Please see a copy of my visit note below.    HPI    [facility]   Follow-up Visit s Mar 20, 2018    PCP: Kaci Francisco      Subjective:  Melissa is a 43 year old female who is seen in follow up for evaluation of   Chief Complaint   Patient presents with     RECHECK     low back pain     Her last visit was on 12/19/17.  Since that time, symptoms were almost resolved, then a week or so ago she had increased returning pain. Melissa Painter is accompanied today by self.       Pain is located right hip and into the right lower leg.  She was seen at the urgent care on 3/17/18.  Was prescribed medrol and robaxin.  The Rx are helping the pain in the legs, but not the hip and low back area.    DEBORA 8/17 for LBP/LLE radicular pain    Patient denies any new injuries.    Patient's past medical, surgical, social and family histories are reviewed today.    Social History: works as a CNA at [employer]     Past Medical History:   Diagnosis Date     Anemia      Chronic fatigue 4/7/2016     Urgency-frequency syndrome        Patient Active Problem List    Diagnosis Date Noted     Acute right-sided low back pain without sciatica 12/19/2017     Priority: Medium     Hepatitis B carrier (H) 05/30/2017     Priority: Medium     Cervicalgia 11/01/2016     Priority: Medium     Acute bilateral low back pain without sciatica 11/01/2016     Priority: Medium     Rectocele 05/05/2016     Priority: Medium     Cystocele, midline 05/05/2016     Priority: Medium     Urgency-frequency syndrome 05/05/2016     Priority: Medium     Iron deficiency anemia, unspecified iron deficiency 04/07/2016     Priority: Medium     Chronic fatigue 04/07/2016     Priority: Medium     Vitamin D deficiency 04/07/2016     Priority:  "Medium     Hair loss 04/07/2016     Priority: Medium     Immunity status testing 04/07/2016     Priority: Medium     Excessive or frequent menstruation 04/07/2016     Priority: Medium     CARDIOVASCULAR SCREENING; LDL GOAL LESS THAN 160 04/07/2016     Priority: Medium     Seasonal allergic rhinitis 04/07/2016     Priority: Medium       Family History   Problem Relation Age of Onset     Hypertension Mother        Social History     Social History     Marital Status: Single     Spouse Name: N/A     Number of Children: N/A     Years of Education: N/A     Occupational History     CNA      Social History Main Topics     Smoking status: Never Smoker      Smokeless tobacco: Never Used     Alcohol Use: 0.0 oz/week     0 Standard drinks or equivalent per week      Comment: 2-3 glasses wine/yr     Drug Use: No       Past Surgical History:   Procedure Laterality Date     AS HYSTEROS W PERMANENT FALLOPAIN IMPLANT  2013    ESSURE     wisdom teeth         Review of Systems   Musculoskeletal: Positive for myalgias.   Neurological: Negative for sensory change and focal weakness.   All other systems reviewed and are negative.        Physical Exam   Musculoskeletal:        Right hip: Normal.     /70  Ht 5' 6\" (1.676 m)  Wt 202 lb (91.6 kg)  BMI 32.6 kg/m2  Constitutional:well-developed, well-nourished, and in no distress.   Cardiovascular: Intact distal pulses.    Neurological: alert. Gait antalgic  Skin: Skin is warm and dry.   Psychiatric: Mood and affect normal.   Respiratory: unlabored, speaks in full sentences  Lymph: no LAD, no lymphangitis      Right Hip Exam   Right hip exam is normal.    Tenderness   None    Range of Motion   Normal right hip ROM    Muscle Strength   Normal right hip strength    Back Exam   Sensation: Normal.  Gait: Antalgic.    Tenderness   None    Range of Motion   Flexion:                    Abnormal  Extension:                Normal  Lateral Bend Left:    Normal  Lateral Bend Right:  " Normal  Rotation Right:         Normal  Rotation Left:           Normal  SLR    Right: Negative  Left:    Negative    Muscle Strength   Normal back strength    Tests   Toe Walk: Normal  Heel Walk: Normal    Reflexes   Patellar:  Normal  Achilles:  Normal    Comments:  FROM, pain at end range of flexion, mild          MR LUMBAR SPINE WITHOUT CONTRAST August 11, 2017 8:27 AM      HISTORY: Eight months low back pain, not resolving with medication and  physical therapy, evaluate for discogenic injury. Low back pain. Left  leg pain.     TECHNIQUE: Sagittal T1 and STIR. Sagittal T2 and axial dual-echo T2.      FINDINGS: Five lumbar vertebrae are assumed. Disc dehydration and  discogenic marrow change at L4-L5. Disc dehydration and high signal  posterior annular fissuring at L5-S1. There is a greater percentage of  red marrow than typically seen. This can be seen as a normal variant,  but also in association with various forms of anemia and clinical  correlation (including CBC) is recommended.      Findings by specific level:     There is minimal to mild multilevel facet degenerative change.     T12-L1, L1-L2, L2-L3, L3-L4: No disc herniation or stenosis.     L4-L5: Disc bulging without central stenosis. Mild bilateral foraminal  stenosis.     L5-S1: Disc bulging without central stenosis. The neural foramina  appear adequate.         IMPRESSION:  1. Disc bulging at L4-L5 and L5-S1, without central stenosis or  high-grade foraminal stenosis.  2. Nonspecific red marrow pattern, as above.     JOHANA ADAMS MD        ICD-10-CM    1. Acute right-sided low back pain without sciatica M54.5         Too soon for DEBORA as she is still on Dosepak. Has voltaren available after that.   Has a good understanding of back exercises form last year, will resume. Went to [facility] last year as well.   If not better in another 1-2 weeks, will consider DEBORA.  Currently off work for one week from .           Again, thank you for allowing me to  participate in the care of your patient.        Sincerely,        Lupillo Lenz MD

## 2018-03-20 NOTE — PROGRESS NOTES
[facility]   Follow-up Visit s Mar 20, 2018    PCP: Kaci Francisco      Subjective:  Melissa is a 43 year old female who is seen in follow up for evaluation of   Chief Complaint   Patient presents with     RECHECK     low back pain     Her last visit was on 12/19/17.  Since that time, symptoms were almost resolved, then a week or so ago she had increased returning pain. Melissa Painter is accompanied today by self.       Pain is located right hip and into the right lower leg.  She was seen at the urgent care on 3/17/18.  Was prescribed medrol and robaxin.  The Rx are helping the pain in the legs, but not the hip and low back area.    DEBORA 8/17 for LBP/LLE radicular pain    Patient denies any new injuries.    Patient's past medical, surgical, social and family histories are reviewed today.    Social History: works as a CNA at [employer]     Past Medical History:   Diagnosis Date     Anemia      Chronic fatigue 4/7/2016     Urgency-frequency syndrome        Patient Active Problem List    Diagnosis Date Noted     Acute right-sided low back pain without sciatica 12/19/2017     Priority: Medium     Hepatitis B carrier (H) 05/30/2017     Priority: Medium     Cervicalgia 11/01/2016     Priority: Medium     Acute bilateral low back pain without sciatica 11/01/2016     Priority: Medium     Rectocele 05/05/2016     Priority: Medium     Cystocele, midline 05/05/2016     Priority: Medium     Urgency-frequency syndrome 05/05/2016     Priority: Medium     Iron deficiency anemia, unspecified iron deficiency 04/07/2016     Priority: Medium     Chronic fatigue 04/07/2016     Priority: Medium     Vitamin D deficiency 04/07/2016     Priority: Medium     Hair loss 04/07/2016     Priority: Medium     Immunity status testing 04/07/2016     Priority: Medium     Excessive or frequent menstruation 04/07/2016     Priority: Medium     CARDIOVASCULAR SCREENING; LDL GOAL LESS THAN 160 04/07/2016      "Priority: Medium     Seasonal allergic rhinitis 04/07/2016     Priority: Medium       Family History   Problem Relation Age of Onset     Hypertension Mother        Social History     Social History     Marital Status: Single     Spouse Name: N/A     Number of Children: N/A     Years of Education: N/A     Occupational History     CNA      Social History Main Topics     Smoking status: Never Smoker      Smokeless tobacco: Never Used     Alcohol Use: 0.0 oz/week     0 Standard drinks or equivalent per week      Comment: 2-3 glasses wine/yr     Drug Use: No       Past Surgical History:   Procedure Laterality Date     AS HYSTEROS W PERMANENT FALLOPAIN IMPLANT  2013    ESSURE     wisdom teeth         Review of Systems   Musculoskeletal: Positive for myalgias.   Neurological: Negative for sensory change and focal weakness.   All other systems reviewed and are negative.        Physical Exam   Musculoskeletal:        Right hip: Normal.     /70  Ht 5' 6\" (1.676 m)  Wt 202 lb (91.6 kg)  BMI 32.6 kg/m2  Constitutional:well-developed, well-nourished, and in no distress.   Cardiovascular: Intact distal pulses.    Neurological: alert. Gait antalgic  Skin: Skin is warm and dry.   Psychiatric: Mood and affect normal.   Respiratory: unlabored, speaks in full sentences  Lymph: no LAD, no lymphangitis      Right Hip Exam   Right hip exam is normal.    Tenderness   None    Range of Motion   Normal right hip ROM    Muscle Strength   Normal right hip strength    Back Exam   Sensation: Normal.  Gait: Antalgic.    Tenderness   None    Range of Motion   Flexion:                    Abnormal  Extension:                Normal  Lateral Bend Left:    Normal  Lateral Bend Right:  Normal  Rotation Right:         Normal  Rotation Left:           Normal  SLR    Right: Negative  Left:    Negative    Muscle Strength   Normal back strength    Tests   Toe Walk: Normal  Heel Walk: Normal    Reflexes   Patellar:  Normal  Achilles:  " Normal    Comments:  FROM, pain at end range of flexion, mild          MR LUMBAR SPINE WITHOUT CONTRAST August 11, 2017 8:27 AM      HISTORY: Eight months low back pain, not resolving with medication and  physical therapy, evaluate for discogenic injury. Low back pain. Left  leg pain.     TECHNIQUE: Sagittal T1 and STIR. Sagittal T2 and axial dual-echo T2.      FINDINGS: Five lumbar vertebrae are assumed. Disc dehydration and  discogenic marrow change at L4-L5. Disc dehydration and high signal  posterior annular fissuring at L5-S1. There is a greater percentage of  red marrow than typically seen. This can be seen as a normal variant,  but also in association with various forms of anemia and clinical  correlation (including CBC) is recommended.      Findings by specific level:     There is minimal to mild multilevel facet degenerative change.     T12-L1, L1-L2, L2-L3, L3-L4: No disc herniation or stenosis.     L4-L5: Disc bulging without central stenosis. Mild bilateral foraminal  stenosis.     L5-S1: Disc bulging without central stenosis. The neural foramina  appear adequate.         IMPRESSION:  1. Disc bulging at L4-L5 and L5-S1, without central stenosis or  high-grade foraminal stenosis.  2. Nonspecific red marrow pattern, as above.     JHOANA ADAMS MD        ICD-10-CM    1. Acute right-sided low back pain without sciatica M54.5         Too soon for DEBORA as she is still on Dosepak. Has voltaren available after that.   Has a good understanding of back exercises form last year, will resume. Went to [facility] last year as well.   If not better in another 1-2 weeks, will consider DEBORA.  Currently off work for one week from .

## 2018-03-27 ENCOUNTER — OFFICE VISIT (OUTPATIENT)
Dept: FAMILY MEDICINE | Facility: CLINIC | Age: 43
End: 2018-03-27
Payer: OTHER GOVERNMENT

## 2018-03-27 ENCOUNTER — OFFICE VISIT (OUTPATIENT)
Dept: ORTHOPEDICS | Facility: CLINIC | Age: 43
End: 2018-03-27
Payer: OTHER GOVERNMENT

## 2018-03-27 VITALS — WEIGHT: 202 LBS | BODY MASS INDEX: 32.47 KG/M2 | HEIGHT: 66 IN

## 2018-03-27 VITALS — DIASTOLIC BLOOD PRESSURE: 78 MMHG | SYSTOLIC BLOOD PRESSURE: 120 MMHG

## 2018-03-27 DIAGNOSIS — L66.10 LICHEN PLANOPILARIS: Primary | ICD-10-CM

## 2018-03-27 DIAGNOSIS — M54.50 ACUTE RIGHT-SIDED LOW BACK PAIN WITHOUT SCIATICA: Primary | ICD-10-CM

## 2018-03-27 PROCEDURE — 99213 OFFICE O/P EST LOW 20 MIN: CPT | Performed by: FAMILY MEDICINE

## 2018-03-27 PROCEDURE — 11900 INJECT SKIN LESIONS </W 7: CPT | Performed by: FAMILY MEDICINE

## 2018-03-27 PROCEDURE — 99213 OFFICE O/P EST LOW 20 MIN: CPT | Mod: 25 | Performed by: FAMILY MEDICINE

## 2018-03-27 ASSESSMENT — ENCOUNTER SYMPTOMS
SENSORY CHANGE: 0
FOCAL WEAKNESS: 0
MYALGIAS: 1

## 2018-03-27 NOTE — LETTER
3/27/2018         RE: Melissa Painter  [address]  [place] [address]        Dear Colleague,    Thank you for referring your patient, Melissa Painter, to the [facility] [place]. Please see a copy of my visit note below.    [facility] (formerly [facility]3] - PRIMARY CARE SKIN    CC : Hair loss   SUBJECTIVE:                                                    Melissa Painter is a 43 year old female who presents to clinic today for follow-up of hair loss on the scalp, first beginning 4 years ago, recurring without any noticeable pattern every 4-6 months.    Last treatment : 2/27/18  Treatment type : triamcinolone 10 mg injected  Treatment number : 1  Other treatments : clobetasol propionate 0.05% solution applied nightly  Side effects : Itchiness and tenderness from bumps on scalp have continued even with use of topical steroid.    Tissue Pathology Report from 2/20/2018       Personal Medical History  Skin Cancer : NO  Eczema Psoriasis Rosacea Autoimmune   NO NO NO NO     Family Medical History  Connective tissue disease : NO  Thyroid disease : NO  Hair Loss : NO  Skin Cancer : NO  Eczema Psoriasis Rosacea Autoimmune   ?YES  NO NO NO   No family history of sickle cell disease.    Occupation : CNA in a hospital (indoor).    Patient Active Problem List   Diagnosis     Iron deficiency anemia, unspecified iron deficiency     Chronic fatigue     Vitamin D deficiency     Hair loss     Immunity status testing     Excessive or frequent menstruation     CARDIOVASCULAR SCREENING; LDL GOAL LESS THAN 160     Seasonal allergic rhinitis     Rectocele     Cystocele, midline     Urgency-frequency syndrome     Cervicalgia     Acute bilateral low back pain without sciatica     Hepatitis B carrier (H)     Acute right-sided low back pain without sciatica       Past Medical History:   Diagnosis Date     Anemia      Chronic fatigue 4/7/2016     Urgency-frequency syndrome     Past Surgical History:   Procedure Laterality Date     AS HYSTEROS W  PERMANENT FALLOPAIN IMPLANT  2013    ESSURE     wisdom teeth        Social History   Substance Use Topics     Smoking status: Never Smoker     Smokeless tobacco: Never Used     Alcohol use 0.0 oz/week     0 Standard drinks or equivalent per week      Comment: 2-3 glasses wine/yr    Family History     Problem (# of Occurrences) Relation (Name,Age of Onset)    Hypertension (1) Mother           Prescription Medications as of 3/27/2018             methylPREDNISolone (MEDROL DOSEPAK) 4 MG tablet Follow package instructions    methocarbamol (ROBAXIN) 750 MG tablet Take 1 tablet (750 mg) by mouth 3 times daily as needed for muscle spasms    clobetasol (TEMOVATE) 0.05 % external solution Apply topically At Bedtime and rub into affected areas of scalp. Never to be used on face nor groin.    diclofenac (VOLTAREN) 75 MG EC tablet Take 1 tablet (75 mg) by mouth 2 times daily as needed for moderate pain    CALCIUM 1000 + D 1000-800 MG-UNIT TABS TAKE ONE TABLET BY MOUTH EVERY DAY WITH FOOD FOR BONE HEALTH AND VITAMIN D SUPPLEMENTATION    Prenatal Vit-Fe Fumarate-FA (PRENATAL LOW IRON) 27-1 MG TABS Take 1 tablet by mouth daily INDICATION: VITAMIN SUPPLEMENTATION    ferrous fumarate 65 mg, [place]. FE,-Vitamin C 125 mg (VITRON C)  MG TABS tablet Take 1 tablet by mouth every morning (before breakfast) INDICATION: IRON SUPPLEMENT    cholecalciferol 5000 UNITS CAPS Take 1 capsule (5,000 Units) by mouth daily INDICATION: LOW VITAMIN D, TAKE WITH FOOD    cetirizine (ZYRTEC) 10 MG tablet TAKE ONE TABLET BY MOUTH EVERY DAY TO TREAT NASAL ALLERGIES    IBUPROFEN PO     fluticasone (FLONASE) 50 MCG/ACT nasal spray Spray 2 sprays in nostril At Bedtime INDICATION: TO CONTROL NASAL ALLERGY SYMPTOMS            Allergies   Allergen Reactions     Levaquin [Levofloxacin] Itching     Influenza Vaccine Live         INTEGUMENTARY/SKIN: POSITIVE for hair loss, pruritus  ROS : 14 point review of systems was negative except the symptoms listed above  in the [facility].    This document serves as a record of the services and decisions personally performed and made by Norma Jacobson MD. It was created on her behalf by Greg Marie, a trained medical scribe.  The creation of this document is based on the scribe's personal observations and the provider's statements to the medical scribe.  Greg Marie, March 27, 2018 9:38 AM      OBJECTIVE:                                                    GENERAL: healthy, alert and no distress  SKIN: Siddiqi Skin Type - VI.  Scalp and Face were examined. The dermatoscope was used to help evaluate pigmented lesions.  Skin Pertinent Findings:  Patchy hair loss on crown of scalp.   Patch of some induration on left lateral frontal scalp, right lateral scalp and right parietal scalp  No skin atrophy at sites of previous injection.  Name : Triamcinolone acetonide (Kenalog) injection.  Indication : lichen planopilaris  Location(s) : scalp.  Completed by : Norma Jacobson MD  Note : Prior to the procedure, we discussed possible hypo- and hyperpigmentation or depression of the skin post-procedure. Explained that more then one injection may be required, that these injections are distributed 4-6 weeks apart, and that up to a total of six injections may be needed.    Skin was cleansed with alcohol. 2.0 mL triamcinolone acetonide 10 mg/mL drawn up.   Injected in 0.1 mL aliquots in the area of alopecia.  Total injected :   1.8 mL = 18 mg into Right parietal scalp, Crown of scalp, and Left lateral frontal scalp  Lot # : [id number]. Expiration Date : Apr 2019  Lot # : [id number]. Expiration Date : Apr 2019    Blanching was present during the injection. Minimal bleeding occurred. Patient left in satisfactory condition.  Treatment number : 2.    MDM : discussed treatment options for lichen planopilaris, expectations of treatment to stop current inflammatory process so less scarring alopecia.      ASSESSMENT:                                                       Encounter Diagnosis   Name Primary?     Lichen planopilaris Yes         PLAN:                                                    Patient Instructions   FUTURE APPOINTMENTS  Follow up in 4-6 weeks.    Discontinue using clobetasol propionate 0.05% solution at this time, but not discard it at this time.        PROCEDURES:                                                    None.    TT: 20 minutes.  CT: 15 minutes.      The information in this document, created by the medical scribe for me, accurately reflects the services I personally performed and the decisions made by me. I have reviewed and approved this document for accuracy prior to leaving the patient care area.  Norma Jacobson MD March 27, 2018 9:32 AM  [facility]    Again, thank you for allowing me to participate in the care of your patient.        Sincerely,        Courtney Jacobson MD

## 2018-03-27 NOTE — LETTER
3/27/2018         RE: Melissa Painter  [address]  [place] [address]        Dear Colleague,    Thank you for referring your patient, Melissa Painter, to the [facility]. Please see a copy of my visit note below.    HPI    [facility]   Follow-up Visit s Mar 27, 2018    PCP: Kaci Francisco      Subjective:  Melissa is a 43 year old female who is seen in follow up for evaluation of   Chief Complaint   Patient presents with     RECHECK     Her last visit was on 3/20/2018.  Since that time, symptoms have been a good deal better. Melissa Painter is accompanied today by self.     Patient reports 80% improvement since last visit.  Patient has noticed improved symptoms with rest and home exercise program treatment.    Pain is located deep right hip and low back, waxing and waning.  Patient is using no aids.    Patient denies any new injuries.    Patient's past medical, surgical, social and family histories are reviewed today.    History from previous visit on 3/20/2018  Her last visit was on 12/19/17.  Since that time, symptoms were almost resolved, then a week or so ago she had increased returning pain. Melissa Painter is accompanied today by self.       Pain is located right hip and into the right lower leg.  She was seen at the urgent care on 3/17/18.  Was prescribed medrol and robaxin.  The Rx are helping the pain in the legs, but not the hip and low back area.    [facility] 8/17 for LBP/LLE radicular pain    Patient denies any new injuries.    Patient's past medical, surgical, social and family histories are reviewed today.    Social History: works as a CNA at [employer]     Past Medical History:   Diagnosis Date     Anemia      Chronic fatigue 4/7/2016     Urgency-frequency syndrome        Patient Active Problem List    Diagnosis Date Noted     Acute right-sided low back pain without sciatica 12/19/2017     Priority: Medium     Hepatitis B carrier (H)  "05/30/2017     Priority: Medium     Cervicalgia 11/01/2016     Priority: Medium     Acute bilateral low back pain without sciatica 11/01/2016     Priority: Medium     Rectocele 05/05/2016     Priority: Medium     Cystocele, midline 05/05/2016     Priority: Medium     Urgency-frequency syndrome 05/05/2016     Priority: Medium     Iron deficiency anemia, unspecified iron deficiency 04/07/2016     Priority: Medium     Chronic fatigue 04/07/2016     Priority: Medium     Vitamin D deficiency 04/07/2016     Priority: Medium     Hair loss 04/07/2016     Priority: Medium     Immunity status testing 04/07/2016     Priority: Medium     Excessive or frequent menstruation 04/07/2016     Priority: Medium     CARDIOVASCULAR SCREENING; LDL GOAL LESS THAN 160 04/07/2016     Priority: Medium     Seasonal allergic rhinitis 04/07/2016     Priority: Medium       Family History   Problem Relation Age of Onset     Hypertension Mother        Social History     Social History     Marital Status: Single     Spouse Name: N/A     Number of Children: N/A     Years of Education: N/A     Occupational History     CNA      Social History Main Topics     Smoking status: Never Smoker      Smokeless tobacco: Never Used     Alcohol Use: 0.0 oz/week     0 Standard drinks or equivalent per week      Comment: 2-3 glasses wine/yr     Drug Use: No       Past Surgical History:   Procedure Laterality Date     AS HYSTEROS W PERMANENT FALLOPAIN IMPLANT  2013    ESSURE     wisdom teeth         Review of Systems   Musculoskeletal: Positive for myalgias.   Neurological: Negative for sensory change and focal weakness.   All other systems reviewed and are negative.        Physical Exam     Ht 5' 6\" (1.676 m)  Wt 202 lb (91.6 kg)  BMI 32.6 kg/m2  Constitutional:well-developed, well-nourished, and in no distress.   Cardiovascular: Intact distal pulses.    Neurological: alert. Gait normal  Skin: Skin is warm and dry.   Psychiatric: Mood and affect normal. "   Respiratory: unlabored, speaks in full sentences  Lymph: no LAD, no lymphangitis      Back Exam   Sensation: Normal.  Gait: Normal.    Tenderness   None    Range of Motion   Flexion:                    Abnormal  Extension:                Normal  Lateral Bend Left:    Normal  Lateral Bend Right:  Normal  Rotation Right:         Normal  Rotation Left:           Normal  SLR    Right: Negative  Left:    Negative    Muscle Strength   Normal back strength    Tests   Toe Walk: Normal  Heel Walk: Normal    Reflexes   Patellar:  Normal  Achilles:  Normal    Comments:  FROM, pain at end range of flexion, mild          MR LUMBAR SPINE WITHOUT CONTRAST August 11, 2017 8:27 AM      HISTORY: Eight months low back pain, not resolving with medication and  physical therapy, evaluate for discogenic injury. Low back pain. Left  leg pain.     TECHNIQUE: Sagittal T1 and STIR. Sagittal T2 and axial dual-echo T2.      FINDINGS: Five lumbar vertebrae are assumed. Disc dehydration and  discogenic marrow change at L4-L5. Disc dehydration and high signal  posterior annular fissuring at L5-S1. There is a greater percentage of  red marrow than typically seen. This can be seen as a normal variant,  but also in association with various forms of anemia and clinical  correlation (including CBC) is recommended.      Findings by specific level:     There is minimal to mild multilevel facet degenerative change.     T12-L1, L1-L2, L2-L3, L3-L4: No disc herniation or stenosis.     L4-L5: Disc bulging without central stenosis. Mild bilateral foraminal  stenosis.     L5-S1: Disc bulging without central stenosis. The neural foramina  appear adequate.         IMPRESSION:  1. Disc bulging at L4-L5 and L5-S1, without central stenosis or  high-grade foraminal stenosis.  2. Nonspecific red marrow pattern, as above.     JOHANA ADAMS MD        ICD-10-CM    1. Acute right-sided low back pain without sciatica M54.5           Doing much better with rest, oral  steroids, and muscle relaxers.  Will return to work later this week.  Emphasized long-standing home exercise program and gave detailed instructions for core strengthening using physio ball.  These will probably be necessary lifelong.  Return as needed, work form completed to return to work without restrictions as of Friday this week.      Again, thank you for allowing me to participate in the care of your patient.        Sincerely,        Lupillo Lenz MD

## 2018-03-27 NOTE — PROGRESS NOTES
[facility]   Follow-up Visit s Mar 27, 2018    PCP: Kaci Frnacisco      Subjective:  Melissa is a 43 year old female who is seen in follow up for evaluation of   Chief Complaint   Patient presents with     RECHECK     Her last visit was on 3/20/2018.  Since that time, symptoms have been a good deal better. Melissa Painter is accompanied today by self.     Patient reports 80% improvement since last visit.  Patient has noticed improved symptoms with rest and home exercise program treatment.    Pain is located deep right hip and low back, waxing and waning.  Patient is using no aids.    Patient denies any new injuries.    Patient's past medical, surgical, social and family histories are reviewed today.    History from previous visit on 3/20/2018  Her last visit was on 12/19/17.  Since that time, symptoms were almost resolved, then a week or so ago she had increased returning pain. Melissa Painter is accompanied today by self.       Pain is located right hip and into the right lower leg.  She was seen at the urgent care on 3/17/18.  Was prescribed medrol and robaxin.  The Rx are helping the pain in the legs, but not the hip and low back area.    [facility] 8/17 for LBP/LLE radicular pain    Patient denies any new injuries.    Patient's past medical, surgical, social and family histories are reviewed today.    Social History: works as a CNA at [employer]     Past Medical History:   Diagnosis Date     Anemia      Chronic fatigue 4/7/2016     Urgency-frequency syndrome        Patient Active Problem List    Diagnosis Date Noted     Acute right-sided low back pain without sciatica 12/19/2017     Priority: Medium     Hepatitis B carrier (H) 05/30/2017     Priority: Medium     Cervicalgia 11/01/2016     Priority: Medium     Acute bilateral low back pain without sciatica 11/01/2016     Priority: Medium     Rectocele 05/05/2016     Priority: Medium     Cystocele, midline 05/05/2016     Priority:  "Medium     Urgency-frequency syndrome 05/05/2016     Priority: Medium     Iron deficiency anemia, unspecified iron deficiency 04/07/2016     Priority: Medium     Chronic fatigue 04/07/2016     Priority: Medium     Vitamin D deficiency 04/07/2016     Priority: Medium     Hair loss 04/07/2016     Priority: Medium     Immunity status testing 04/07/2016     Priority: Medium     Excessive or frequent menstruation 04/07/2016     Priority: Medium     CARDIOVASCULAR SCREENING; LDL GOAL LESS THAN 160 04/07/2016     Priority: Medium     Seasonal allergic rhinitis 04/07/2016     Priority: Medium       Family History   Problem Relation Age of Onset     Hypertension Mother        Social History     Social History     Marital Status: Single     Spouse Name: N/A     Number of Children: N/A     Years of Education: N/A     Occupational History     CNA      Social History Main Topics     Smoking status: Never Smoker      Smokeless tobacco: Never Used     Alcohol Use: 0.0 oz/week     0 Standard drinks or equivalent per week      Comment: 2-3 glasses wine/yr     Drug Use: No       Past Surgical History:   Procedure Laterality Date     AS HYSTEROS W PERMANENT FALLOPAIN IMPLANT  2013    ESSURE     wisdom teeth         Review of Systems   Musculoskeletal: Positive for myalgias.   Neurological: Negative for sensory change and focal weakness.   All other systems reviewed and are negative.        Physical Exam     Ht 5' 6\" (1.676 m)  Wt 202 lb (91.6 kg)  BMI 32.6 kg/m2  Constitutional:well-developed, well-nourished, and in no distress.   Cardiovascular: Intact distal pulses.    Neurological: alert. Gait normal  Skin: Skin is warm and dry.   Psychiatric: Mood and affect normal.   Respiratory: unlabored, speaks in full sentences  Lymph: no LAD, no lymphangitis      Back Exam   Sensation: Normal.  Gait: Normal.    Tenderness   None    Range of Motion   Flexion:                    Abnormal  Extension:                Normal  Lateral Bend Left:  "   Normal  Lateral Bend Right:  Normal  Rotation Right:         Normal  Rotation Left:           Normal  SLR    Right: Negative  Left:    Negative    Muscle Strength   Normal back strength    Tests   Toe Walk: Normal  Heel Walk: Normal    Reflexes   Patellar:  Normal  Achilles:  Normal    Comments:  FROM, pain at end range of flexion, mild          MR LUMBAR SPINE WITHOUT CONTRAST August 11, 2017 8:27 AM      HISTORY: Eight months low back pain, not resolving with medication and  physical therapy, evaluate for discogenic injury. Low back pain. Left  leg pain.     TECHNIQUE: Sagittal T1 and STIR. Sagittal T2 and axial dual-echo T2.      FINDINGS: Five lumbar vertebrae are assumed. Disc dehydration and  discogenic marrow change at L4-L5. Disc dehydration and high signal  posterior annular fissuring at L5-S1. There is a greater percentage of  red marrow than typically seen. This can be seen as a normal variant,  but also in association with various forms of anemia and clinical  correlation (including CBC) is recommended.      Findings by specific level:     There is minimal to mild multilevel facet degenerative change.     T12-L1, L1-L2, L2-L3, L3-L4: No disc herniation or stenosis.     L4-L5: Disc bulging without central stenosis. Mild bilateral foraminal  stenosis.     L5-S1: Disc bulging without central stenosis. The neural foramina  appear adequate.         IMPRESSION:  1. Disc bulging at L4-L5 and L5-S1, without central stenosis or  high-grade foraminal stenosis.  2. Nonspecific red marrow pattern, as above.     JOHANA ADAMS MD        ICD-10-CM    1. Acute right-sided low back pain without sciatica M54.5           Doing much better with rest, oral steroids, and muscle relaxers.  Will return to work later this week.  Emphasized long-standing home exercise program and gave detailed instructions for core strengthening using physio ball.  These will probably be necessary lifelong.  Return as needed, work form completed  to return to work without restrictions as of Friday this week.

## 2018-03-27 NOTE — MR AVS SNAPSHOT
After Visit Summary   3/27/2018    Melissa Painter    MRN: [id number]           Patient Information     Date Of Birth          1975        Visit Information        Provider Department      3/27/2018 9:40 AM Courtney Jacobson MD [facility] [place]        Today's Diagnoses     Lichen planopilaris    -  1      Care Instructions    FUTURE APPOINTMENTS  Follow up in 4-6 weeks.    Discontinue using clobetasol propionate 0.05% solution at this time, but not discard it at this time.    You may continue using the same shampoos and hair products.          Follow-ups after your visit        Who to contact     If you have questions or need follow up information about today's clinic visit or your schedule please contact [facility] [place] directly at [phone].  Normal or non-critical lab and imaging results will be communicated to you by MyChart, letter or phone within 4 business days after the clinic has received the results. If you do not hear from us within 7 days, please contact the clinic through MyChart or phone. If you have a critical or abnormal lab result, we will notify you by phone as soon as possible.  Submit refill requests through [employer] or call your pharmacy and they will forward the refill request to us. Please allow 3 business days for your refill to be completed.          Additional Information About Your Visit        MyChart Information     [employer] gives you secure access to your electronic health record. If you see a primary care provider, you can also send messages to your care team and make appointments. If you have questions, please call your primary care clinic.  If you do not have a primary care provider, please call [phone] and they will assist you.        Care EveryWhere ID     This is your Care EveryWhere ID. This could be used by other organizations to access your [place] medical records  [id number]         Blood Pressure from Last 3  Encounters:   03/27/18 120/78   03/20/18 106/70   03/17/18 102/68    Weight from Last 3 Encounters:   03/27/18 202 lb (91.6 kg)   03/20/18 202 lb (91.6 kg)   03/17/18 202 lb (91.6 kg)              Today, you had the following     No orders found for display       Primary Care Provider Office Phone # Fax #    Kaci Francisco [phone]       XXX RESIGNED XXX  [facility] [address]        Equal Access to Services     [facility]: Hadii raleigh ku hadasho Soomaali, waaxda luqadaha, qaybta kaalmada nicho, prakash munoz . So [facility] [phone].    ATENCIÓN: Si habla español, tiene a edwards disposición servicios gratuitos de asistencia lingüística. [facility] [phone].    We comply with applicable federal civil rights laws and [place] laws. We do not discriminate on the basis of race, color, national origin, age, disability, sex, sexual orientation, or gender identity.            Thank you!     Thank you for choosing [facility]  for your care. Our goal is always to provide you with excellent care. Hearing back from our patients is one way we can continue to improve our services. Please take a few minutes to complete the written survey that you may receive in the mail after your visit with us. Thank you!             Your Updated Medication List - Protect others around you: Learn how to safely use, store and throw away your medicines at www.disposemymeds.org.          This list is accurate as of 3/27/18  9:50 AM.  Always use your most recent med list.                   Brand Name Dispense Instructions for use Diagnosis    CALCIUM 1000 + D 1000-800 MG-UNIT Tabs   Generic drug:  Calcium Carb-Cholecalciferol     100 tablet    TAKE ONE TABLET BY MOUTH EVERY DAY WITH FOOD FOR BONE HEALTH AND VITAMIN D SUPPLEMENTATION    Chronic fatigue, Vitamin D deficiency       cetirizine 10 MG tablet    zyrTEC    90 tablet    TAKE ONE TABLET BY MOUTH EVERY DAY TO TREAT NASAL  ALLERGIES    Seasonal allergic rhinitis       cholecalciferol 5000 UNITS Caps     90 capsule    Take 1 capsule (5,000 Units) by mouth daily INDICATION: LOW VITAMIN D, TAKE WITH FOOD    Chronic fatigue       clobetasol 0.05 % external solution    TEMOVATE    50 mL    Apply topically At Bedtime and rub into affected areas of scalp. Never to be used on face nor groin.    Lichen planopilaris       diclofenac 75 MG EC tablet    VOLTAREN    30 tablet    Take 1 tablet (75 mg) by mouth 2 times daily as needed for moderate pain    Acute right-sided low back pain without sciatica, DDD (degenerative disc disease), lumbar       ferrous fumarate 65 mg ([place]. FE)-Vitamin C 125 mg  MG Tabs tablet    VITRON C    100 tablet    Take 1 tablet by mouth every morning (before breakfast) INDICATION: IRON SUPPLEMENT    Chronic fatigue, Iron deficiency anemia, unspecified iron deficiency anemia type       fluticasone 50 MCG/ACT spray    FLONASE    16 g    Spray 2 sprays in nostril At Bedtime INDICATION: TO CONTROL NASAL ALLERGY SYMPTOMS    Seasonal allergic rhinitis       IBUPROFEN PO           methocarbamol 750 MG tablet    ROBAXIN    60 tablet    Take 1 tablet (750 mg) by mouth 3 times daily as needed for muscle spasms    Acute right-sided low back pain with right-sided sciatica       methylPREDNISolone 4 MG tablet    MEDROL DOSEPAK    21 tablet    Follow package instructions    Acute right-sided low back pain with right-sided sciatica       PRENATAL LOW IRON 27-1 MG Tabs     90 tablet    Take 1 tablet by mouth daily INDICATION: VITAMIN SUPPLEMENTATION    Iron deficiency anemia, unspecified iron deficiency anemia type, Menorrhagia with regular cycle

## 2018-03-27 NOTE — PATIENT INSTRUCTIONS
FUTURE APPOINTMENTS  Follow up in 4-6 weeks.    Discontinue using clobetasol propionate 0.05% solution at this time, but not discard it at this time.    You may continue using the same shampoos and hair products.

## 2018-03-27 NOTE — MR AVS SNAPSHOT
After Visit Summary   3/27/2018    Melissa Painter    MRN: [id number]           Patient Information     Date Of Birth          1975        Visit Information        Provider Department      3/27/2018 9:00 AM Lupillo Lenz MD [facility]        Today's Diagnoses     Acute right-sided low back pain without sciatica    -  1       Follow-ups after your visit        Your next 10 appointments already scheduled     Apr 24, 2018  9:40 AM CDT   Office Visit with Courtney Jacobson MD   [facility]    [address] [place] [id number]   [phone]           Bring a current list of meds and any records pertaining to this visit. For Physicals, please bring immunization records and any forms needing to be filled out. Please arrive 10 minutes early to complete paperwork.              Who to contact     If you have questions or need follow up information about today's clinic visit or your schedule please contact [facility] directly at [phone].  Normal or non-critical lab and imaging results will be communicated to you by [employer] Sciences[employer], letter or phone within 4 business days after the clinic has received the results. If you do not hear from us within 7 days, please contact the clinic through [employer] or phone. If you have a critical or abnormal lab result, we will notify you by phone as soon as possible.  Submit refill requests through H[employer]us or call your pharmacy and they will forward the refill request to us. Please allow 3 business days for your refill to be completed.          Additional Information About Your Visit        MyChart Information     [employer] gives you secure access to your electronic health record. If you see a primary care provider, you can also send messages to your care team and make appointments. If you have questions, please call  "your primary care clinic.  If you do not have a primary care provider, please call [phone] and they will assist you.        Care EveryWhere ID     This is your Care EveryWhere ID. This could be used by other organizations to access your [place] medical records  [id number]        Your Vitals Were     Height BMI (Body Mass Index)                5' 6\" (1.676 m) 32.6 kg/m2           Blood Pressure from Last 3 Encounters:   03/27/18 120/78   03/20/18 106/70   03/17/18 102/68    Weight from Last 3 Encounters:   03/27/18 202 lb (91.6 kg)   03/20/18 202 lb (91.6 kg)   03/17/18 202 lb (91.6 kg)              Today, you had the following     No orders found for display         Today's Medication Changes          These changes are accurate as of 3/27/18  4:43 PM.  If you have any questions, ask your nurse or doctor.               Start taking these medicines.        Dose/Directions    triamcinolone acetonide 10 MG/ML injection   Commonly known as:  KENALOG   Used for:  Lichen planopilaris   Started by:  Courtney Jacobson MD        Dose:  18 mg   Inject 1.8 mLs (18 mg) into the skin once for 1 dose   Quantity:  1.8 mL   Refills:  0            Where to get your medicines      Some of these will need a paper prescription and others can be bought over the counter.  Ask your nurse if you have questions.     You don't need a prescription for these medications     triamcinolone acetonide 10 MG/ML injection                Primary Care Provider Office Phone # Fax #    Kaci Francisco [phone]       XXX RESIGNED XXX  [facility] [address]        Equal Access to Services     BERT THIBODEAUX : Hadgabriela Doss, prakash pond. So [facility] [phone].    ATENCIÓN: Si habla español, tiene a edwards disposición servicios gratuitos de asistencia lingüística. Llame al [phone].    We comply with applicable federal civil rights " laws and [place] laws. We do not discriminate on the basis of race, color, national origin, age, disability, sex, sexual orientation, or gender identity.            Thank you!     Thank you for choosing [facility] [place]  for your care. Our goal is always to provide you with excellent care. Hearing back from our patients is one way we can continue to improve our services. Please take a few minutes to complete the written survey that you may receive in the mail after your visit with us. Thank you!             Your Updated Medication List - Protect others around you: Learn how to safely use, store and throw away your medicines at www.disposemymeds.org.          This list is accurate as of 3/27/18  4:43 PM.  Always use your most recent med list.                   Brand Name Dispense Instructions for use Diagnosis    CALCIUM 1000 + D 1000-800 MG-UNIT Tabs   Generic drug:  Calcium Carb-Cholecalciferol     100 tablet    TAKE ONE TABLET BY MOUTH EVERY DAY WITH FOOD FOR BONE HEALTH AND VITAMIN D SUPPLEMENTATION    Chronic fatigue, Vitamin D deficiency       cetirizine 10 MG tablet    zyrTEC    90 tablet    TAKE ONE TABLET BY MOUTH EVERY DAY TO TREAT NASAL ALLERGIES    Seasonal allergic rhinitis       cholecalciferol 5000 UNITS Caps     90 capsule    Take 1 capsule (5,000 Units) by mouth daily INDICATION: LOW VITAMIN D, TAKE WITH FOOD    Chronic fatigue       clobetasol 0.05 % external solution    TEMOVATE    50 mL    Apply topically At Bedtime and rub into affected areas of scalp. Never to be used on face nor groin.    Lichen planopilaris       diclofenac 75 MG EC tablet    VOLTAREN    30 tablet    Take 1 tablet (75 mg) by mouth 2 times daily as needed for moderate pain    Acute right-sided low back pain without sciatica, DDD (degenerative disc disease), lumbar       ferrous fumarate 65 mg ([place]. FE)-Vitamin C 125 mg  MG Tabs tablet    VITRON C    100 tablet    Take 1 tablet by mouth  every morning (before breakfast) INDICATION: IRON SUPPLEMENT    Chronic fatigue, Iron deficiency anemia, unspecified iron deficiency anemia type       fluticasone 50 MCG/ACT spray    FLONASE    16 g    Spray 2 sprays in nostril At Bedtime INDICATION: TO CONTROL NASAL ALLERGY SYMPTOMS    Seasonal allergic rhinitis       IBUPROFEN PO           methocarbamol 750 MG tablet    ROBAXIN    60 tablet    Take 1 tablet (750 mg) by mouth 3 times daily as needed for muscle spasms    Acute right-sided low back pain with right-sided sciatica       methylPREDNISolone 4 MG tablet    MEDROL DOSEPAK    21 tablet    Follow package instructions    Acute right-sided low back pain with right-sided sciatica       PRENATAL LOW IRON 27-1 MG Tabs     90 tablet    Take 1 tablet by mouth daily INDICATION: VITAMIN SUPPLEMENTATION    Iron deficiency anemia, unspecified iron deficiency anemia type, Menorrhagia with regular cycle       triamcinolone acetonide 10 MG/ML injection    KENALOG    1.8 mL    Inject 1.8 mLs (18 mg) into the skin once for 1 dose    Lichen planopilaris

## 2018-04-24 ENCOUNTER — OFFICE VISIT (OUTPATIENT)
Dept: FAMILY MEDICINE | Facility: CLINIC | Age: 43
End: 2018-04-24
Payer: OTHER GOVERNMENT

## 2018-04-24 VITALS — DIASTOLIC BLOOD PRESSURE: 79 MMHG | HEART RATE: 72 BPM | OXYGEN SATURATION: 98 % | SYSTOLIC BLOOD PRESSURE: 120 MMHG

## 2018-04-24 DIAGNOSIS — L66.10 LICHEN PLANOPILARIS: Primary | ICD-10-CM

## 2018-04-24 PROCEDURE — 99213 OFFICE O/P EST LOW 20 MIN: CPT | Performed by: FAMILY MEDICINE

## 2018-04-24 NOTE — LETTER
4/24/2018         RE: Melissa Painter  [address]  [place] [address]        Dear Colleague,    Thank you for referring your patient, Melissa Painter, to the [facility] [place]. Please see a copy of my visit note below.    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    Last treatment : 3/27/18  Treatment type : triamcinolone 18 mg injected  Treatment number : 2  Other treatments : clobetasol propionate 0.05% solution applied nightly previously  Response : Itchiness has diminished. No tenderness reported.  Side effects : None    Products used : coconut oil used to wash hair. Srikanth hair conditioner.    Tissue Pathology Report from 2/20/2018       Personal Medical History  Skin Cancer : NO  Eczema Psoriasis Rosacea Autoimmune   NO NO NO NO     Family Medical History  Connective tissue disease : NO  Thyroid disease : NO  Hair Loss : NO  Skin Cancer : NO  Eczema Psoriasis Rosacea Autoimmune   ?YES  NO NO NO   No family history of sickle cell disease.    Occupation : CNA in a hospital (indoor).    Patient Active Problem List   Diagnosis     Iron deficiency anemia, unspecified iron deficiency     Chronic fatigue     Vitamin D deficiency     Hair loss     Immunity status testing     Excessive or frequent menstruation     CARDIOVASCULAR SCREENING; LDL GOAL LESS THAN 160     Seasonal allergic rhinitis     Rectocele     Cystocele, midline     Urgency-frequency syndrome     Cervicalgia     Acute bilateral low back pain without sciatica     Hepatitis B carrier (H)     Acute right-sided low back pain without sciatica       Past Medical History:   Diagnosis Date     Anemia      Chronic fatigue 4/7/2016      Urgency-frequency syndrome     Past Surgical History:   Procedure Laterality Date     AS HYSTEROS W PERMANENT FALLOPAIN IMPLANT  2013    ESSURE     wisdom teeth        Social History   Substance Use Topics     Smoking status: Never Smoker     Smokeless tobacco: Never Used     Alcohol use 0.0 oz/week     0 Standard drinks or equivalent per week      Comment: 2-3 glasses wine/yr    Family History     Problem (# of Occurrences) Relation (Name,Age of Onset)    Hypertension (1) Mother           Prescription Medications as of 4/24/2018             CALCIUM 1000 + D 1000-800 MG-UNIT TABS TAKE ONE TABLET BY MOUTH EVERY DAY WITH FOOD FOR BONE HEALTH AND VITAMIN D SUPPLEMENTATION    cetirizine (ZYRTEC) 10 MG tablet TAKE ONE TABLET BY MOUTH EVERY DAY TO TREAT NASAL ALLERGIES    cholecalciferol 5000 UNITS CAPS Take 1 capsule (5,000 Units) by mouth daily INDICATION: LOW VITAMIN D, TAKE WITH FOOD    clobetasol (TEMOVATE) 0.05 % external solution Apply topically At Bedtime and rub into affected areas of scalp. Never to be used on face nor groin.    diclofenac (VOLTAREN) 75 MG EC tablet Take 1 tablet (75 mg) by mouth 2 times daily as needed for moderate pain    ferrous fumarate 65 mg, [place]. FE,-Vitamin C 125 mg (VITRON C)  MG TABS tablet Take 1 tablet by mouth every morning (before breakfast) INDICATION: IRON SUPPLEMENT    fluticasone (FLONASE) 50 MCG/ACT nasal spray Spray 2 sprays in nostril At Bedtime INDICATION: TO CONTROL NASAL ALLERGY SYMPTOMS    IBUPROFEN PO     methocarbamol (ROBAXIN) 750 MG tablet Take 1 tablet (750 mg) by mouth 3 times daily as needed for muscle spasms    methylPREDNISolone (MEDROL DOSEPAK) 4 MG tablet Follow package instructions    Prenatal Vit-Fe Fumarate-FA (PRENATAL LOW IRON) 27-1 MG TABS Take 1 tablet by mouth daily INDICATION: VITAMIN SUPPLEMENTATION            Allergies   Allergen Reactions     Levaquin [Levofloxacin] Itching     Influenza Vaccine Live         INTEGUMENTARY/SKIN: POSITIVE for  hair loss  ROS : 14 point review of systems was negative except the symptoms listed above in the HPI.    This document serves as a record of the services and decisions personally performed and made by Norma Jacobson MD. It was created on her behalf by Greg Marie, a trained medical scribe.  The creation of this document is based on the scribe's personal observations and the provider's statements to the medical scribe.  Greg Marie, April 24, 2018 9:42 AM      OBJECTIVE:                                                    GENERAL: healthy, alert and no distress  SKIN: Siddiqi Skin Type - VI.  Scalp and Face were examined. The dermatoscope was used to help evaluate pigmented lesions.  Skin Pertinent Findings:  Mid-frontal scalp : Patchy hair loss with widest diameter of 44 mm, narrowest diameter of 10 mm. Scarring on mid-line of frontal scalp. No induration. No erythema.  No skin atrophy at sites of previous injection.    No new patches of alopecia      MDM : discussed treatment options for lichen planopilaris, expectations of treatment to stop current inflammatory process so less scarring alopecia.      ASSESSMENT:                                                      Encounter Diagnosis   Name Primary?     Lichen planopilaris Yes         PLAN:                                                    Patient Instructions   FUTURE APPOINTMENTS  Follow up in 3 month(s). Return to clinic earlier as needed if itchiness, tenderness, redness or scaling begin to recur.    Hair growth will not return at previous scarred areas of hair loss.        PROCEDURES:                                                    None.    TT: 20 minutes.  CT: 15 minutes.      The information in this document, created by the medical scribe for me, accurately reflects the services I personally performed and the decisions made by me. I have reviewed and approved this document for accuracy prior to leaving the patient care area.  Norma Jacobson MD April 24,  2018 9:40 AM  [facility]    Again, thank you for allowing me to participate in the care of your patient.        Sincerely,        Courtney Jacobson MD

## 2018-04-24 NOTE — PATIENT INSTRUCTIONS
FUTURE APPOINTMENTS  Follow up in 3 month(s). Return to clinic earlier as needed if itchiness, tenderness, redness or scaling begin to recur.    Hair growth will not return at previous scarred areas of hair loss.

## 2018-04-24 NOTE — MR AVS SNAPSHOT
After Visit Summary   4/24/2018    Melissa Painter    MRN: [id number]           Patient Information     Date Of Birth          1975        Visit Information        Provider Department      4/24/2018 9:40 AM Courtney Jacobson MD [facility] [place]        Today's Diagnoses     Lichen planopilaris    -  1      Care Instructions    FUTURE APPOINTMENTS  Follow up in 3 month(s). Return to clinic earlier as needed if itchiness, tenderness, redness or scaling begin to recur.    Hair growth will not return at previous scarred areas of hair loss.          Follow-ups after your visit        Who to contact     If you have questions or need follow up information about today's clinic visit or your schedule please contact [facility] [place] directly at [phone].  Normal or non-critical lab and imaging results will be communicated to you by MyChart, letter or phone within 4 business days after the clinic has received the results. If you do not hear from us within 7 days, please contact the clinic through MyChart or phone. If you have a critical or abnormal lab result, we will notify you by phone as soon as possible.  Submit refill requests through [employer] or call your pharmacy and they will forward the refill request to us. Please allow 3 business days for your refill to be completed.          Additional Information About Your Visit        MyChart Information     [employer] gives you secure access to your electronic health record. If you see a primary care provider, you can also send messages to your care team and make appointments. If you have questions, please call your primary care clinic.  If you do not have a primary care provider, please call [phone] and they will assist you.        Care EveryWhere ID     This is your Care EveryWhere ID. This could be used by other organizations to access your [facility] medical records  [id number]        Your Vitals Were     Pulse Pulse  Oximetry                72 98%           Blood Pressure from Last 3 Encounters:   04/24/18 120/79   03/27/18 120/78   03/20/18 106/70    Weight from Last 3 Encounters:   03/27/18 202 lb (91.6 kg)   03/20/18 202 lb (91.6 kg)   03/17/18 202 lb (91.6 kg)              Today, you had the following     No orders found for display       Primary Care Provider Office Phone # Fax #    Kaci Francisco [phone]       XXX RESIGNED XXX  [facility] [id number]        Equal Access to Services     [facility]: Hadii raleigh jean-baptiste hadkashif Sol[place], waaxda [place], qaybbasilio kaalmafilomena torre, prakash munoz . So [facility] [phone].    ATENCIÓN: Si habla español, tiene a edwards disposición servicios gratuitos de asistencia lingüística. [facility] [phone].    We comply with applicable federal civil rights laws and [place] laws. We do not discriminate on the basis of race, color, national origin, age, disability, sex, sexual orientation, or gender identity.            Thank you!     Thank you for choosing [facility]  for your care. Our goal is always to provide you with excellent care. Hearing back from our patients is one way we can continue to improve our services. Please take a few minutes to complete the written survey that you may receive in the mail after your visit with us. Thank you!             Your Updated Medication List - Protect others around you: Learn how to safely use, store and throw away your medicines at www.disposemymeds.org.          This list is accurate as of 4/24/18  9:55 AM.  Always use your most recent med list.                   Brand Name Dispense Instructions for use Diagnosis    CALCIUM 1000 + D 1000-800 MG-UNIT Tabs   Generic drug:  Calcium Carb-Cholecalciferol     100 tablet    TAKE ONE TABLET BY MOUTH EVERY DAY WITH FOOD FOR BONE HEALTH AND VITAMIN D SUPPLEMENTATION    Chronic fatigue, Vitamin D deficiency       cetirizine 10 MG tablet    zyrTEC     90 tablet    TAKE ONE TABLET BY MOUTH EVERY DAY TO TREAT NASAL ALLERGIES    Seasonal allergic rhinitis       cholecalciferol 5000 units Caps     90 capsule    Take 1 capsule (5,000 Units) by mouth daily INDICATION: LOW VITAMIN D, TAKE WITH FOOD    Chronic fatigue       clobetasol 0.05 % external solution    TEMOVATE    50 mL    Apply topically At Bedtime and rub into affected areas of scalp. Never to be used on face nor groin.    Lichen planopilaris       diclofenac 75 MG EC tablet    VOLTAREN    30 tablet    Take 1 tablet (75 mg) by mouth 2 times daily as needed for moderate pain    Acute right-sided low back pain without sciatica, DDD (degenerative disc disease), lumbar       ferrous fumarate 65 mg ([place]. FE)-Vitamin C 125 mg  MG Tabs tablet    VITRON C    100 tablet    Take 1 tablet by mouth every morning (before breakfast) INDICATION: IRON SUPPLEMENT    Chronic fatigue, Iron deficiency anemia, unspecified iron deficiency anemia type       fluticasone 50 MCG/ACT spray    FLONASE    16 g    Spray 2 sprays in nostril At Bedtime INDICATION: TO CONTROL NASAL ALLERGY SYMPTOMS    Seasonal allergic rhinitis       IBUPROFEN PO           methocarbamol 750 MG tablet    ROBAXIN    60 tablet    Take 1 tablet (750 mg) by mouth 3 times daily as needed for muscle spasms    Acute right-sided low back pain with right-sided sciatica       methylPREDNISolone 4 MG tablet    MEDROL DOSEPAK    21 tablet    Follow package instructions    Acute right-sided low back pain with right-sided sciatica       PRENATAL LOW IRON 27-1 MG Tabs     90 tablet    Take 1 tablet by mouth daily INDICATION: VITAMIN SUPPLEMENTATION    Iron deficiency anemia, unspecified iron deficiency anemia type, Menorrhagia with regular cycle

## 2018-04-24 NOTE — PROGRESS NOTES
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    Last treatment : 3/27/18  Treatment type : triamcinolone 18 mg injected  Treatment number : 2  Other treatments : clobetasol propionate 0.05% solution applied nightly previously  Response : Itchiness has diminished. No tenderness reported.  Side effects : None    Products used : coconut oil used to wash hair. Srikanth hair conditioner.    Tissue Pathology Report from 2/20/2018       Personal Medical History  Skin Cancer : NO  Eczema Psoriasis Rosacea Autoimmune   NO NO NO NO     Family Medical History  Connective tissue disease : NO  Thyroid disease : NO  Hair Loss : NO  Skin Cancer : NO  Eczema Psoriasis Rosacea Autoimmune   ?YES  NO NO NO   No family history of sickle cell disease.    Occupation : CNA in a hospital (indoor).    Patient Active Problem List   Diagnosis     Iron deficiency anemia, unspecified iron deficiency     Chronic fatigue     Vitamin D deficiency     Hair loss     Immunity status testing     Excessive or frequent menstruation     CARDIOVASCULAR SCREENING; LDL GOAL LESS THAN 160     Seasonal allergic rhinitis     Rectocele     Cystocele, midline     Urgency-frequency syndrome     Cervicalgia     Acute bilateral low back pain without sciatica     Hepatitis B carrier (H)     Acute right-sided low back pain without sciatica       Past Medical History:   Diagnosis Date     Anemia      Chronic fatigue 4/7/2016     Urgency-frequency syndrome     Past Surgical History:   Procedure Laterality Date     AS HYSTEROS W PERMANENT FALLOPAIN IMPLANT  2013    ESSURE     wisdom teeth        Social History   Substance Use Topics     Smoking status: Never Smoker     Smokeless  tobacco: Never Used     Alcohol use 0.0 oz/week     0 Standard drinks or equivalent per week      Comment: 2-3 glasses wine/yr    Family History     Problem (# of Occurrences) Relation (Name,Age of Onset)    Hypertension (1) Mother           Prescription Medications as of 4/24/2018             CALCIUM 1000 + D 1000-800 MG-UNIT TABS TAKE ONE TABLET BY MOUTH EVERY DAY WITH FOOD FOR BONE HEALTH AND VITAMIN D SUPPLEMENTATION    cetirizine (ZYRTEC) 10 MG tablet TAKE ONE TABLET BY MOUTH EVERY DAY TO TREAT NASAL ALLERGIES    cholecalciferol 5000 UNITS CAPS Take 1 capsule (5,000 Units) by mouth daily INDICATION: LOW VITAMIN D, TAKE WITH FOOD    clobetasol (TEMOVATE) 0.05 % external solution Apply topically At Bedtime and rub into affected areas of scalp. Never to be used on face nor groin.    diclofenac (VOLTAREN) 75 MG EC tablet Take 1 tablet (75 mg) by mouth 2 times daily as needed for moderate pain    ferrous fumarate 65 mg, [place]. FE,-Vitamin C 125 mg (VITRON C)  MG TABS tablet Take 1 tablet by mouth every morning (before breakfast) INDICATION: IRON SUPPLEMENT    fluticasone (FLONASE) 50 MCG/ACT nasal spray Spray 2 sprays in nostril At Bedtime INDICATION: TO CONTROL NASAL ALLERGY SYMPTOMS    IBUPROFEN PO     methocarbamol (ROBAXIN) 750 MG tablet Take 1 tablet (750 mg) by mouth 3 times daily as needed for muscle spasms    methylPREDNISolone (MEDROL DOSEPAK) 4 MG tablet Follow package instructions    Prenatal Vit-Fe Fumarate-FA (PRENATAL LOW IRON) 27-1 MG TABS Take 1 tablet by mouth daily INDICATION: VITAMIN SUPPLEMENTATION            Allergies   Allergen Reactions     Levaquin [Levofloxacin] Itching     Influenza Vaccine Live         INTEGUMENTARY/SKIN: POSITIVE for hair loss  ROS : 14 point review of systems was negative except the symptoms listed above in the HPI.    This document serves as a record of the services and decisions personally performed and made by Norma Jacobson MD. It was created on her behalf by  Greg Marie, a trained medical scribe.  The creation of this document is based on the scribe's personal observations and the provider's statements to the medical scribe.  Greg Marie, April 24, 2018 9:42 AM      OBJECTIVE:                                                    GENERAL: healthy, alert and no distress  SKIN: Siddiqi Skin Type - VI.  Scalp and Face were examined. The dermatoscope was used to help evaluate pigmented lesions.  Skin Pertinent Findings:  Mid-frontal scalp : Patchy hair loss with widest diameter of 44 mm, narrowest diameter of 10 mm. Scarring on mid-line of frontal scalp. No induration. No erythema.  No skin atrophy at sites of previous injection.    No new patches of alopecia      MDM : discussed treatment options for lichen planopilaris, expectations of treatment to stop current inflammatory process so less scarring alopecia.      ASSESSMENT:                                                      Encounter Diagnosis   Name Primary?     Lichen planopilaris Yes         PLAN:                                                    Patient Instructions   FUTURE APPOINTMENTS  Follow up in 3 month(s). Return to clinic earlier as needed if itchiness, tenderness, redness or scaling begin to recur.    Hair growth will not return at previous scarred areas of hair loss.        PROCEDURES:                                                    None.    TT: 20 minutes.  CT: 15 minutes.      The information in this document, created by the medical scribe for me, accurately reflects the services I personally performed and the decisions made by me. I have reviewed and approved this document for accuracy prior to leaving the patient care area.  Norma Jacobson MD April 24, 2018 9:40 AM  [facility]

## 2018-04-27 ENCOUNTER — OFFICE VISIT (OUTPATIENT)
Dept: ORTHOPEDICS | Facility: CLINIC | Age: 43
End: 2018-04-27
Payer: OTHER GOVERNMENT

## 2018-04-27 ENCOUNTER — TELEPHONE (OUTPATIENT)
Dept: ORTHOPEDICS | Facility: CLINIC | Age: 43
End: 2018-04-27

## 2018-04-27 VITALS
DIASTOLIC BLOOD PRESSURE: 85 MMHG | SYSTOLIC BLOOD PRESSURE: 126 MMHG | WEIGHT: 202 LBS | HEIGHT: 66 IN | BODY MASS INDEX: 32.47 KG/M2

## 2018-04-27 DIAGNOSIS — M54.50 ACUTE RIGHT-SIDED LOW BACK PAIN WITHOUT SCIATICA: ICD-10-CM

## 2018-04-27 DIAGNOSIS — M53.3 SI (SACROILIAC) JOINT DYSFUNCTION: Primary | ICD-10-CM

## 2018-04-27 PROCEDURE — 99214 OFFICE O/P EST MOD 30 MIN: CPT | Performed by: FAMILY MEDICINE

## 2018-04-27 RX ORDER — DICLOFENAC SODIUM 75 MG/1
75 TABLET, DELAYED RELEASE ORAL 2 TIMES DAILY PRN
Qty: 30 TABLET | Refills: 1 | Status: SHIPPED | OUTPATIENT
Start: 2018-04-27 | End: 2018-07-11

## 2018-04-27 ASSESSMENT — ENCOUNTER SYMPTOMS
BACK PAIN: 1
FOCAL WEAKNESS: 0
SENSORY CHANGE: 0

## 2018-04-27 NOTE — TELEPHONE ENCOUNTER
Reason for Call:  Other call back    Detailed comments: Pt states note to be off work    Phone Number Patient can be reached at: Home number on file [phone] (home)    Best Time: anytime     Can we leave a detailed message on this number? YES    Call taken on 4/27/2018 at 2:08 PM by Courtney Leigh

## 2018-04-27 NOTE — PROGRESS NOTES
[facility]   Follow-up Visit s Apr 27, 2018    PCP: Kaci Francisco      Subjective:  Melissa is a 43 year old female who is seen in follow up for evaluation of   Chief Complaint   Patient presents with     RECHECK     Her last visit was on 3/27/2018.  Since that time, symptoms have been  a good deal worse. Melissa Painter is accompanied today by friend.       Patient has noticed a fluctuating course of symptoms with HEP treatment.    Pain is located midline low back and right posterior hip, waxing and waning.  Patient is using no aids.    Patient denies any new injuries.    DEBORA 8/17 for LBP/LLE radicular pain    Patient denies any new injuries.    Patient's past medical, surgical, social and family histories are reviewed today.    Social History: works as a CNA at  [employer]     Past Medical History:   Diagnosis Date     Anemia      Chronic fatigue 4/7/2016     Urgency-frequency syndrome        Patient Active Problem List    Diagnosis Date Noted     Acute right-sided low back pain without sciatica 12/19/2017     Priority: Medium     Hepatitis B carrier (H) 05/30/2017     Priority: Medium     Cervicalgia 11/01/2016     Priority: Medium     Acute bilateral low back pain without sciatica 11/01/2016     Priority: Medium     Rectocele 05/05/2016     Priority: Medium     Cystocele, midline 05/05/2016     Priority: Medium     Urgency-frequency syndrome 05/05/2016     Priority: Medium     Iron deficiency anemia, unspecified iron deficiency 04/07/2016     Priority: Medium     Chronic fatigue 04/07/2016     Priority: Medium     Vitamin D deficiency 04/07/2016     Priority: Medium     Hair loss 04/07/2016     Priority: Medium     Immunity status testing 04/07/2016     Priority: Medium     Excessive or frequent menstruation 04/07/2016     Priority: Medium     CARDIOVASCULAR SCREENING; LDL GOAL LESS THAN 160 04/07/2016     Priority: Medium     Seasonal allergic rhinitis 04/07/2016      "Priority: Medium       Family History   Problem Relation Age of Onset     Hypertension Mother        Social History     Social History     Marital Status: Single     Spouse Name: N/A     Number of Children: N/A     Years of Education: N/A     Occupational History     CNA      Social History Main Topics     Smoking status: Never Smoker      Smokeless tobacco: Never Used     Alcohol Use: 0.0 oz/week     0 Standard drinks or equivalent per week      Comment: 2-3 glasses wine/yr     Drug Use: No       Past Surgical History:   Procedure Laterality Date     AS HYSTEROS W PERMANENT FALLOPAIN IMPLANT  2013    ESSURE     wisdom teeth         Review of Systems   Musculoskeletal: Positive for back pain.   Neurological: Negative for sensory change and focal weakness.   All other systems reviewed and are negative.        Physical Exam  /85  Ht 5' 6\" (1.676 m)  Wt 202 lb (91.6 kg)  BMI 32.6 kg/m2  Constitutional:well-developed, well-nourished, and in no distress.   Cardiovascular: Intact distal pulses.    Neurological: alert. Gait normal  Skin: Skin is warm and dry.   Psychiatric: Mood and affect normal.   Respiratory: unlabored, speaks in full sentences  Lymph: no LAD, no lymphangitis    Has difficulty getting on and off exam table.     Back Exam   Sensation: Normal.  Gait: Normal.    Tenderness   The patient is experiencing tenderness in the lumbar, sacroiliac tenderness.    Range of Motion   Flexion:                    Abnormal  Extension:                Normal  Lateral Bend Left:    Normal  Lateral Bend Right:  Normal  Rotation Right:         Normal  Rotation Left:           Normal  SLR    Right: Negative  Left:    Negative    Muscle Strength   Normal back strength    Tests   Toe Walk: Normal  Heel Walk: Normal    Reflexes   Patellar:  Normal  Achilles:  Normal    Comments:  FROM, pain mostly with returning to standing from FF position    Pain with diagonal extension, RIGHT more than LEFT     Figure 4, pelvic " rocking, painful at RIGHT SI area          MR LUMBAR SPINE WITHOUT CONTRAST August 11, 2017 8:27 AM      HISTORY: Eight months low back pain, not resolving with medication and  physical therapy, evaluate for discogenic injury. Low back pain. Left  leg pain.     TECHNIQUE: Sagittal T1 and STIR. Sagittal T2 and axial dual-echo T2.      FINDINGS: Five lumbar vertebrae are assumed. Disc dehydration and  discogenic marrow change at L4-L5. Disc dehydration and high signal  posterior annular fissuring at L5-S1. There is a greater percentage of  red marrow than typically seen. This can be seen as a normal variant,  but also in association with various forms of anemia and clinical  correlation (including CBC) is recommended.      Findings by specific level:     There is minimal to mild multilevel facet degenerative change.     T12-L1, L1-L2, L2-L3, L3-L4: No disc herniation or stenosis.     L4-L5: Disc bulging without central stenosis. Mild bilateral foraminal  stenosis.     L5-S1: Disc bulging without central stenosis. The neural foramina  appear adequate.         IMPRESSION:  1. Disc bulging at L4-L5 and L5-S1, without central stenosis or  high-grade foraminal stenosis.  2. Nonspecific red marrow pattern, as above.     JOHANA ADAMS MD        ICD-10-CM    1. SI (sacroiliac) joint dysfunction M53.3 XR Sacroiliac Therapeutic Injection Right     diclofenac (VOLTAREN) 75 MG EC tablet   2. Acute right-sided low back pain without sciatica M54.5 diclofenac (VOLTAREN) 75 MG EC tablet     More like RIGHT SI pain today.  Offered SI cortisone steroid injection. Pt eager to proceed.  Declines work restrictions.  Ok for refill of diclofenac.

## 2018-04-27 NOTE — MR AVS SNAPSHOT
After Visit Summary   4/27/2018    Melissa Painter    MRN: [id number]           Patient Information     Date Of Birth          1975        Visit Information        Provider Department      4/27/2018 10:00 AM Lupillo Lenz MD [facility] [place]        Today's Diagnoses     SI (sacroiliac) joint dysfunction    -  1    Acute right-sided low back pain without sciatica           Follow-ups after your visit        Future tests that were ordered for you today     Open Future Orders        Priority Expected Expires Ordered    XR Sacroiliac Therapeutic Injection Right Routine 4/27/2018 4/27/2019 4/27/2018            Who to contact     If you have questions or need follow up information about today's clinic visit or your schedule please contact [facility] [place] directly at [phone].  Normal or non-critical lab and imaging results will be communicated to you by [employer], letter or phone within 4 business days after the clinic has received the results. If you do not hear from us within 7 days, please contact the clinic through [employer] or phone. If you have a critical or abnormal lab result, we will notify you by phone as soon as possible.  Submit refill requests through [employer] or call your pharmacy and they will forward the refill request to us. Please allow 3 business days for your refill to be completed.          Additional Information About Your Visit        MyChart Information     [employer] gives you secure access to your electronic health record. If you see a primary care provider, you can also send messages to your care team and make appointments. If you have questions, please call your primary care clinic.  If you do not have a primary care provider, please call [phone] and they will assist you.        Care EveryWhere ID     This is your Care EveryWhere ID. This could be used by other organizations to access your [place]  "medical records  [id number]        Your Vitals Were     Height BMI (Body Mass Index)                5' 6\" (1.676 m) 32.6 kg/m2           Blood Pressure from Last 3 Encounters:   04/27/18 126/85   04/24/18 120/79   03/27/18 120/78    Weight from Last 3 Encounters:   04/27/18 202 lb (91.6 kg)   03/27/18 202 lb (91.6 kg)   03/20/18 202 lb (91.6 kg)                 Where to get your medicines      These medications were sent to [id number]  [facility] - [address]   [address] [address]     Phone:  [phone]     diclofenac 75 MG EC tablet          Primary Care Provider Office Phone # Fax #    Kaci Francisco [phone]       XXX RESIGNED XXX  [facility] [id number]        Equal Access to Services     [facility]: Hadii raleigh jean-baptiste hadasho Soomaali, waaxda luqadaha, qaybta kaalmada adeegyada, prakash munoz . So [facility] [phone].    ATENCIÓN: Si habla español, tiene a edwards disposición servicios gratuitos de asistencia lingüística. Llame al [phone].    We comply with applicable federal civil rights laws and [place] laws. We do not discriminate on the basis of race, color, national origin, age, disability, sex, sexual orientation, or gender identity.            Thank you!     Thank you for choosing [facility] [place]  for your care. Our goal is always to provide you with excellent care. Hearing back from our patients is one way we can continue to improve our services. Please take a few minutes to complete the written survey that you may receive in the mail after your visit with us. Thank you!             Your Updated Medication List - Protect others around you: Learn how to safely use, store and throw away your medicines at www.disposemymeds.org.          This list is accurate as of 4/27/18 10:36 AM.  Always use your most recent med list.                   Brand Name Dispense " Instructions for use Diagnosis    CALCIUM 1000 + D 1000-800 MG-UNIT Tabs   Generic drug:  Calcium Carb-Cholecalciferol     100 tablet    TAKE ONE TABLET BY MOUTH EVERY DAY WITH FOOD FOR BONE HEALTH AND VITAMIN D SUPPLEMENTATION    Chronic fatigue, Vitamin D deficiency       cetirizine 10 MG tablet    zyrTEC    90 tablet    TAKE ONE TABLET BY MOUTH EVERY DAY TO TREAT NASAL ALLERGIES    Seasonal allergic rhinitis       cholecalciferol 5000 units Caps     90 capsule    Take 1 capsule (5,000 Units) by mouth daily INDICATION: LOW VITAMIN D, TAKE WITH FOOD    Chronic fatigue       clobetasol 0.05 % external solution    TEMOVATE    50 mL    Apply topically At Bedtime and rub into affected areas of scalp. Never to be used on face nor groin.    Lichen planopilaris       diclofenac 75 MG EC tablet    VOLTAREN    30 tablet    Take 1 tablet (75 mg) by mouth 2 times daily as needed for moderate pain    Acute right-sided low back pain without sciatica, SI (sacroiliac) joint dysfunction       ferrous fumarate 65 mg ([place]. FE)-Vitamin C 125 mg  MG Tabs tablet    VITRON C    100 tablet    Take 1 tablet by mouth every morning (before breakfast) INDICATION: IRON SUPPLEMENT    Chronic fatigue, Iron deficiency anemia, unspecified iron deficiency anemia type       fluticasone 50 MCG/ACT spray    FLONASE    16 g    Spray 2 sprays in nostril At Bedtime INDICATION: TO CONTROL NASAL ALLERGY SYMPTOMS    Seasonal allergic rhinitis       IBUPROFEN PO           methocarbamol 750 MG tablet    ROBAXIN    60 tablet    Take 1 tablet (750 mg) by mouth 3 times daily as needed for muscle spasms    Acute right-sided low back pain with right-sided sciatica       PRENATAL LOW IRON 27-1 MG Tabs     90 tablet    Take 1 tablet by mouth daily INDICATION: VITAMIN SUPPLEMENTATION    Iron deficiency anemia, unspecified iron deficiency anemia type, Menorrhagia with regular cycle

## 2018-04-27 NOTE — TELEPHONE ENCOUNTER
Patient is requesting letter to be off work. She would like to rest from work, as it makes the pain worse, until she received the injection. She would like the letter emailed to her at [email or web address].    Melly Coronado M.Ed., ATR, ATC

## 2018-04-27 NOTE — LETTER
[facility] [place]  [address] [place] [address]  Phone: [phone]  Fax: [phone]    04/27/18    Melissa Painter  [address] [address]      To whom it may concern:     Melissa will need to be out of work due to back pain, awaiting an injection. This estimated to be one week or less.     Sincerely,      Lupillo Lenz MD

## 2018-04-27 NOTE — LETTER
4/27/2018         RE: Melissa Painter  [address]  [place] [address]        Dear Colleague,    Thank you for referring your patient, Melissa Painter, to the [facility] [place]. Please see a copy of my visit note below.    HPI    [facility]   Follow-up Visit s Apr 27, 2018    PCP: Kaci Francisco      Subjective:  Melissa is a 43 year old female who is seen in follow up for evaluation of   Chief Complaint   Patient presents with     RECHECK     Her last visit was on 3/27/2018.  Since that time, symptoms have been  a good deal worse. Melissa Painter is accompanied today by friend.       Patient has noticed a fluctuating course of symptoms with HEP treatment.    Pain is located midline low back and right posterior hip, waxing and waning.  Patient is using no aids.    Patient denies any new injuries.    DEBORA 8/17 for LBP/LLE radicular pain    Patient denies any new injuries.    Patient's past medical, surgical, social and family histories are reviewed today.    Social History: works as a CNA at [employer]     Past Medical History:   Diagnosis Date     Anemia      Chronic fatigue 4/7/2016     Urgency-frequency syndrome        Patient Active Problem List    Diagnosis Date Noted     Acute right-sided low back pain without sciatica 12/19/2017     Priority: Medium     Hepatitis B carrier (H) 05/30/2017     Priority: Medium     Cervicalgia 11/01/2016     Priority: Medium     Acute bilateral low back pain without sciatica 11/01/2016     Priority: Medium     Rectocele 05/05/2016     Priority: Medium     Cystocele, midline 05/05/2016     Priority: Medium     Urgency-frequency syndrome 05/05/2016     Priority: Medium     Iron deficiency anemia, unspecified iron deficiency 04/07/2016     Priority: Medium     Chronic fatigue 04/07/2016     Priority: Medium     Vitamin D deficiency 04/07/2016     Priority: Medium     Hair loss 04/07/2016     Priority: Medium      "Immunity status testing 04/07/2016     Priority: Medium     Excessive or frequent menstruation 04/07/2016     Priority: Medium     CARDIOVASCULAR SCREENING; LDL GOAL LESS THAN 160 04/07/2016     Priority: Medium     Seasonal allergic rhinitis 04/07/2016     Priority: Medium       Family History   Problem Relation Age of Onset     Hypertension Mother        Social History     Social History     Marital Status: Single     Spouse Name: N/A     Number of Children: N/A     Years of Education: N/A     Occupational History     CNA      Social History Main Topics     Smoking status: Never Smoker      Smokeless tobacco: Never Used     Alcohol Use: 0.0 oz/week     0 Standard drinks or equivalent per week      Comment: 2-3 glasses wine/yr     Drug Use: No       Past Surgical History:   Procedure Laterality Date     AS HYSTEROS W PERMANENT FALLOPAIN IMPLANT  2013    ESSURE     wisdom teeth         Review of Systems   Musculoskeletal: Positive for back pain.   Neurological: Negative for sensory change and focal weakness.   All other systems reviewed and are negative.        Physical Exam  /85  Ht 5' 6\" (1.676 m)  Wt 202 lb (91.6 kg)  BMI 32.6 kg/m2  Constitutional:well-developed, well-nourished, and in no distress.   Cardiovascular: Intact distal pulses.    Neurological: alert. Gait normal  Skin: Skin is warm and dry.   Psychiatric: Mood and affect normal.   Respiratory: unlabored, speaks in full sentences  Lymph: no LAD, no lymphangitis    Has difficulty getting on and off exam table.     Back Exam   Sensation: Normal.  Gait: Normal.    Tenderness   The patient is experiencing tenderness in the lumbar, sacroiliac tenderness.    Range of Motion   Flexion:                    Abnormal  Extension:                Normal  Lateral Bend Left:    Normal  Lateral Bend Right:  Normal  Rotation Right:         Normal  Rotation Left:           Normal  SLR    Right: Negative  Left:    Negative    Muscle Strength   Normal back " strength    Tests   Toe Walk: Normal  Heel Walk: Normal    Reflexes   Patellar:  Normal  Achilles:  Normal    Comments:  FROM, pain mostly with returning to standing from FF position    Pain with diagonal extension, RIGHT more than LEFT     Figure 4, pelvic rocking, painful at RIGHT SI area          MR LUMBAR SPINE WITHOUT CONTRAST August 11, 2017 8:27 AM      HISTORY: Eight months low back pain, not resolving with medication and  physical therapy, evaluate for discogenic injury. Low back pain. Left  leg pain.     TECHNIQUE: Sagittal T1 and STIR. Sagittal T2 and axial dual-echo T2.      FINDINGS: Five lumbar vertebrae are assumed. Disc dehydration and  discogenic marrow change at L4-L5. Disc dehydration and high signal  posterior annular fissuring at L5-S1. There is a greater percentage of  red marrow than typically seen. This can be seen as a normal variant,  but also in association with various forms of anemia and clinical  correlation (including CBC) is recommended.      Findings by specific level:     There is minimal to mild multilevel facet degenerative change.     T12-L1, L1-L2, L2-L3, L3-L4: No disc herniation or stenosis.     L4-L5: Disc bulging without central stenosis. Mild bilateral foraminal  stenosis.     L5-S1: Disc bulging without central stenosis. The neural foramina  appear adequate.         IMPRESSION:  1. Disc bulging at L4-L5 and L5-S1, without central stenosis or  high-grade foraminal stenosis.  2. Nonspecific red marrow pattern, as above.     JOHANA ADAMS MD        ICD-10-CM    1. SI (sacroiliac) joint dysfunction M53.3 XR Sacroiliac Therapeutic Injection Right     diclofenac (VOLTAREN) 75 MG EC tablet   2. Acute right-sided low back pain without sciatica M54.5 diclofenac (VOLTAREN) 75 MG EC tablet     More like RIGHT SI pain today.  Offered SI cortisone steroid injection. Pt eager to proceed.  Declines work restrictions.  Ok for refill of diclofenac.             Again, thank you for allowing  me to participate in the care of your patient.        Sincerely,        Lupillo Lenz MD

## 2018-05-07 ENCOUNTER — TELEPHONE (OUTPATIENT)
Dept: FAMILY MEDICINE | Facility: CLINIC | Age: 43
End: 2018-05-07

## 2018-05-07 NOTE — TELEPHONE ENCOUNTER
Pt states she was supposed to get a call back last week about helping her schedule a steriod injection.  Can someone please call her back regarding this.

## 2018-05-08 ENCOUNTER — TELEPHONE (OUTPATIENT)
Dept: ORTHOPEDICS | Facility: CLINIC | Age: 43
End: 2018-05-08

## 2018-05-08 NOTE — TELEPHONE ENCOUNTER
Melissa Painter is a 43 year old female who was called regarding her injeciton. States noone has called her to scheduled. I have her the imaging number to call and jarad.     The patient was satisfied with this answer and agreed to the plan.     Patient expecting call back: NO      Preferred contact number:  Can we leave a detailed message on this number: Not Applicable

## 2018-05-10 ENCOUNTER — HOSPITAL ENCOUNTER (OUTPATIENT)
Dept: GENERAL RADIOLOGY | Facility: CLINIC | Age: 43
Discharge: HOME | End: 2018-05-10
Attending: FAMILY MEDICINE | Admitting: FAMILY MEDICINE
Payer: OTHER GOVERNMENT

## 2018-05-10 DIAGNOSIS — M53.3 SI (SACROILIAC) JOINT DYSFUNCTION: ICD-10-CM

## 2018-05-10 PROCEDURE — 25500064 ZZH RX 255 OP 636: Performed by: PHYSICIAN ASSISTANT

## 2018-05-10 PROCEDURE — 27096 INJECT SACROILIAC JOINT: CPT | Mod: RT

## 2018-05-10 PROCEDURE — 25000128 H RX IP 250 OP 636: Performed by: PHYSICIAN ASSISTANT

## 2018-05-10 PROCEDURE — 25000125 ZZHC RX 250: Performed by: PHYSICIAN ASSISTANT

## 2018-05-10 RX ORDER — IOPAMIDOL 408 MG/ML
10 INJECTION, SOLUTION INTRATHECAL ONCE
Status: COMPLETED | OUTPATIENT
Start: 2018-05-10 | End: 2018-05-10

## 2018-05-10 RX ORDER — BUPIVACAINE HYDROCHLORIDE 5 MG/ML
30 INJECTION, SOLUTION PERINEURAL ONCE
Status: COMPLETED | OUTPATIENT
Start: 2018-05-10 | End: 2018-05-10

## 2018-05-10 RX ORDER — TRIAMCINOLONE ACETONIDE 40 MG/ML
40 INJECTION, SUSPENSION INTRA-ARTICULAR; INTRAMUSCULAR ONCE
Status: COMPLETED | OUTPATIENT
Start: 2018-05-10 | End: 2018-05-10

## 2018-05-10 RX ADMIN — IOPAMIDOL 1 ML: 408 INJECTION, SOLUTION INTRATHECAL at 12:13

## 2018-05-10 RX ADMIN — TRIAMCINOLONE ACETONIDE 40 MG: 40 INJECTION, SUSPENSION INTRA-ARTICULAR; INTRAMUSCULAR at 12:11

## 2018-05-10 RX ADMIN — BUPIVACAINE HYDROCHLORIDE 2 ML: 5 INJECTION, SOLUTION EPIDURAL; INTRACAUDAL; PERINEURAL at 12:11

## 2018-05-10 RX ADMIN — LIDOCAINE HYDROCHLORIDE 3 ML: 10 INJECTION, SOLUTION EPIDURAL; INFILTRATION; INTRACAUDAL; PERINEURAL at 12:12

## 2018-05-10 NOTE — IP AVS SNAPSHOT
MRN:[id number]                      After Visit Summary   5/10/2018    Melissa Painter    MRN: [id number]           Visit Information        Provider Department      5/10/2018 11:00 AM [facility] RAD; [facility] Radiology           Review of your medicines      UNREVIEWED medicines. Ask your doctor about these medicines        Dose / Directions    CALCIUM 1000 + D 1000-800 MG-UNIT Tabs   Used for:  Chronic fatigue, Vitamin D deficiency   Generic drug:  Calcium Carb-Cholecalciferol        TAKE ONE TABLET BY MOUTH EVERY DAY WITH FOOD FOR BONE HEALTH AND VITAMIN D SUPPLEMENTATION   Quantity:  100 tablet   Refills:  3       cetirizine 10 MG tablet   Commonly known as:  zyrTEC   Used for:  Seasonal allergic rhinitis        TAKE ONE TABLET BY MOUTH EVERY DAY TO TREAT NASAL ALLERGIES   Quantity:  90 tablet   Refills:  2       cholecalciferol 5000 units Caps   Used for:  Chronic fatigue        Dose:  1 capsule   Take 1 capsule (5,000 Units) by mouth daily INDICATION: LOW VITAMIN D, TAKE WITH FOOD   Quantity:  90 capsule   Refills:  3       clobetasol 0.05 % external solution   Commonly known as:  TEMOVATE   Used for:  Lichen planopilaris        Apply topically At Bedtime and rub into affected areas of scalp. Never to be used on face nor groin.   Quantity:  50 mL   Refills:  1       diclofenac 75 MG EC tablet   Commonly known as:  VOLTAREN   Used for:  Acute right-sided low back pain without sciatica, SI (sacroiliac) joint dysfunction        Dose:  75 mg   Take 1 tablet (75 mg) by mouth 2 times daily as needed for moderate pain   Quantity:  30 tablet   Refills:  1       ferrous fumarate 65 mg ([place]. FE)-Vitamin C 125 mg  MG Tabs tablet   Commonly known as:  VITRON C   Used for:  Chronic fatigue, Iron deficiency anemia, unspecified iron deficiency anemia type        Dose:  1 tablet   Take 1 tablet by mouth every morning (before breakfast) INDICATION: IRON SUPPLEMENT   Quantity:  100 tablet    Refills:  prn       fluticasone 50 MCG/ACT spray   Commonly known as:  FLONASE   Used for:  Seasonal allergic rhinitis        Dose:  2 spray   Spray 2 sprays in nostril At Bedtime INDICATION: TO CONTROL NASAL ALLERGY SYMPTOMS   Quantity:  16 g   Refills:  PRN       IBUPROFEN PO        Refills:  0       methocarbamol 750 MG tablet   Commonly known as:  ROBAXIN   Used for:  Acute right-sided low back pain with right-sided sciatica        Dose:  750 mg   Take 1 tablet (750 mg) by mouth 3 times daily as needed for muscle spasms   Quantity:  60 tablet   Refills:  0       PRENATAL LOW IRON 27-1 MG Tabs   Used for:  Iron deficiency anemia, unspecified iron deficiency anemia type, Menorrhagia with regular cycle        Dose:  1 tablet   Take 1 tablet by mouth daily INDICATION: VITAMIN SUPPLEMENTATION   Quantity:  90 tablet   Refills:  prn                Protect others around you: Learn how to safely use, store and throw away your medicines at www.disposemymeds.org.         Follow-ups after your visit         Care Instructions        Further instructions from your care team         Orthopedic Discharge Instructions:   After Your Injection or Aspiration  ________________________________________    Patient Name:  Melissa Painter  Today's Date:  May 10, 2018  The doctor who performed your INJECTION PROCEDURE was Abisai Chambers at [facility]  in the RADIOLOGY Department  Care of needle site    If you have new numbness down your leg, this may last up to 6 hours, but it should go away. You may need help with walking until your leg feels normal.     Over the next 24 to 48 hours, pain at the needle site may increase before it gets better.     For the next 48 hours, use ice packs for 15 minutes, three to four times a day for pain.    If you have a bandage, you may remove it the next morning.     No tub baths, hot tubs or swimming for 48 hours. You may shower the next day.   Activity    Do not drive until morning.      Limit your activity based on your pain level. Follow your doctor s orders for activity.     You may eat a normal diet.     If you had sedation,   - You may feel sleepy, forgetful or unsteady.   - Do not drink alcohol for 24 hours.  Medicines    If you take aspirin or platelet inhibitors, you can restart them tomorrow.     Restart all other medicines today at your regular dose, including Coumadin (warfarin).    If you are restarting Coumadin, talk to your doctor about having your INR checked.   If you had a steroid shot     The medicine should help reduce swelling and pain. This may take from 7 to 10 days.     Side effects from the shot will be mild and go away in 2 to 3 days. Common side effects may include:  -  Insomnia (trouble sleeping).  -  Heartburn.  -  Flushed face.  -  Water retention (bloating or fluid build-up).  -  Increased appetite (feeling more hungry than usual).  -  Increased blood sugar.  If you have diabetes, watch your blood sugar closely. If needed, call your doctor to help you control your blood sugar.  Some patients will get lasting relief from a single shot. Others may require up to three shots to get results. If you have more than one steroid shot, they should be given two weeks apart.  Some patients do not have relief of symptoms.    Follow-up:  NO FOLLOW-UP NEEDED     Call your doctor or go to the Emergency Room if you have severe pain, fever or problems with bowel or bladder control.      Additional Information About Your Visit        [facility] Information     [facility] gives you secure access to your electronic health record. If you see a primary care provider, you can also send messages to your care team and make appointments. If you have questions, please call your primary care clinic.  If you do not have a primary care provider, please call [phone] and they will assist you.        Care EveryWhere ID     This is your Care EveryWhere ID. This could be used by other organizations to  access your [facility] medical records  [id number]         Primary Care Provider Fax #    Physician No Ref-Primary [phone]      Equal Access to Services     PENNY THIBODEAUX : Hadii aad ku hadkimberlynina Viera, gabriela miltonpamela, gayathri torre, prakash lovinglaura noguera. So [facility] [phone].    ATENCIÓN: Si habla español, tiene a edwards disposición servicios gratuitos de asistencia lingüística. Llame al [phone].    We comply with applicable federal civil rights laws and [place] laws. We do not discriminate on the basis of race, color, national origin, age, disability, sex, sexual orientation, or gender identity.            Thank you!     Thank you for choosing [facility] for your care. Our goal is always to provide you with excellent care. Hearing back from our patients is one way we can continue to improve our services. Please take a few minutes to complete the written survey that you may receive in the mail after you visit with us. Thank you!             Medication List: This is a list of all your medications and when to take them. Check marks below indicate your daily home schedule. Keep this list as a reference.      Medications           Morning Afternoon Evening Bedtime As Needed    CALCIUM 1000 + D 1000-800 MG-UNIT Tabs   TAKE ONE TABLET BY MOUTH EVERY DAY WITH FOOD FOR BONE HEALTH AND VITAMIN D SUPPLEMENTATION   Generic drug:  Calcium Carb-Cholecalciferol                                cetirizine 10 MG tablet   Commonly known as:  zyrTEC   TAKE ONE TABLET BY MOUTH EVERY DAY TO TREAT NASAL ALLERGIES                                cholecalciferol 5000 units Caps   Take 1 capsule (5,000 Units) by mouth daily INDICATION: LOW VITAMIN D, TAKE WITH FOOD                                clobetasol 0.05 % external solution   Commonly known as:  TEMOVATE   Apply topically At Bedtime and rub into affected areas of scalp. Never to be used on face nor groin.                                 diclofenac 75 MG EC tablet   Commonly known as:  VOLTAREN   Take 1 tablet (75 mg) by mouth 2 times daily as needed for moderate pain                                ferrous fumarate 65 mg ([place]. FE)-Vitamin C 125 mg  MG Tabs tablet   Commonly known as:  VITRON C   Take 1 tablet by mouth every morning (before breakfast) INDICATION: IRON SUPPLEMENT                                fluticasone 50 MCG/ACT spray   Commonly known as:  FLONASE   Spray 2 sprays in nostril At Bedtime INDICATION: TO CONTROL NASAL ALLERGY SYMPTOMS                                IBUPROFEN PO                                methocarbamol 750 MG tablet   Commonly known as:  ROBAXIN   Take 1 tablet (750 mg) by mouth 3 times daily as needed for muscle spasms                                PRENATAL LOW IRON 27-1 MG Tabs   Take 1 tablet by mouth daily INDICATION: VITAMIN SUPPLEMENTATION

## 2018-05-10 NOTE — PROGRESS NOTES
RADIOLOGY PROCEDURE NOTE  Patient name: Melissa Painter  MRN: [id number]  : 1975    Pre-procedure diagnosis: Sacroiliitis  Post-procedure diagnosis: Same    Procedure Date/Time: May 10, 2018  12:32 PM  Procedure: Right SI joint steroid injection  Estimated blood loss: None  Specimen(s) collected with description: none  The patient tolerated the procedure well with no immediate complications.  Significant findings:none    See imaging dictation for procedural details.    Provider name: Abisai Chambers  Assistant(s):None

## 2018-05-10 NOTE — IP AVS SNAPSHOT
[facility]    [address] [address]    Phone:  [phone]                                       After Visit Summary   5/10/2018    Melissa Painter    MRN: [id number]           After Visit Summary Signature Page     I have received my discharge instructions, and my questions have been answered. I have discussed any challenges I see with this plan with the nurse or doctor.    ..........................................................................................................................................  Patient/Patient Representative Signature      ..........................................................................................................................................  Patient Representative Print Name and Relationship to Patient    ..................................................               ................................................  Date                                            Time    ..........................................................................................................................................  Reviewed by Signature/Title    ...................................................              ..............................................  Date                                                            Time

## 2018-05-10 NOTE — DISCHARGE INSTRUCTIONS
Orthopedic Discharge Instructions:   After Your Injection or Aspiration  ________________________________________    Patient Name:  Melissa Painter  Today's Date:  May 10, 2018  The doctor who performed your INJECTION PROCEDURE was Abisai Chambers at [facility]  in the RADIOLOGY Department  Care of needle site    If you have new numbness down your leg, this may last up to 6 hours, but it should go away. You may need help with walking until your leg feels normal.     Over the next 24 to 48 hours, pain at the needle site may increase before it gets better.     For the next 48 hours, use ice packs for 15 minutes, three to four times a day for pain.    If you have a bandage, you may remove it the next morning.     No tub baths, hot tubs or swimming for 48 hours. You may shower the next day.   Activity    Do not drive until morning.     Limit your activity based on your pain level. Follow your doctor s orders for activity.     You may eat a normal diet.     If you had sedation,   - You may feel sleepy, forgetful or unsteady.   - Do not drink alcohol for 24 hours.  Medicines    If you take aspirin or platelet inhibitors, you can restart them tomorrow.     Restart all other medicines today at your regular dose, including Coumadin (warfarin).    If you are restarting Coumadin, talk to your doctor about having your INR checked.   If you had a steroid shot     The medicine should help reduce swelling and pain. This may take from 7 to 10 days.     Side effects from the shot will be mild and go away in 2 to 3 days. Common side effects may include:  -  Insomnia (trouble sleeping).  -  Heartburn.  -  Flushed face.  -  Water retention (bloating or fluid build-up).  -  Increased appetite (feeling more hungry than usual).  -  Increased blood sugar.  If you have diabetes, watch your blood sugar closely. If needed, call your doctor to help you control your blood sugar.  Some patients will get lasting relief from a  single shot. Others may require up to three shots to get results. If you have more than one steroid shot, they should be given two weeks apart.  Some patients do not have relief of symptoms.    Follow-up:  NO FOLLOW-UP NEEDED     Call your doctor or go to the Emergency Room if you have severe pain, fever or problems with bowel or bladder control.

## 2018-05-11 ENCOUNTER — TELEPHONE (OUTPATIENT)
Dept: ORTHOPEDICS | Facility: CLINIC | Age: 43
End: 2018-05-11

## 2018-05-11 NOTE — TELEPHONE ENCOUNTER
Patient would like to return to work on Tuesday 5/15.    Plan per GM is to contact patient on Monday 5/14 and check to see how she is doing prior to sending out a letter.

## 2018-05-11 NOTE — TELEPHONE ENCOUNTER
Reason for Call:  Other Pt request note or letter    Detailed comments: Pt needs letter to be able to go back to work on Juan, May 13th   Fax to [facility]  [phone]     Phone Number Patient can be reached at: Home number on file [phone] (home)    Best Time: anytime    Can we leave a detailed message on this number? YES    Call taken on 5/11/2018 at 3:18 PM by Courtney Leigh

## 2018-05-11 NOTE — LETTER
[facility] [place]  [address] [place] [address]  Phone: [phone]  Fax: [phone]    May 14, 2018        Melissa Painter  [address] [facility] [address]          To whom it may concern:    RE: Melissa Painter    Patient may return to work 5/15/18 with the following:  No working or lifting restrictions    Please contact me for questions or concerns.      Sincerely,        Lupillo Lenz MD

## 2018-05-14 NOTE — TELEPHONE ENCOUNTER
Called to check and see how patient is doing. Asked her to call back with an update of how she is doing and if she is ready to return to work.    Awaiting call back from patient. Please ask if she is still having any symptoms or issues that could interfere with returning to work.

## 2018-05-14 NOTE — TELEPHONE ENCOUNTER
Patient states she is feeling much better today and would like to return to work without restrictions.

## 2018-05-14 NOTE — TELEPHONE ENCOUNTER
Patient left message returning call and needs RTW letter.     She can be reached at [phone].     JAVIER Gandara RN

## 2018-05-31 ENCOUNTER — OFFICE VISIT (OUTPATIENT)
Dept: FAMILY MEDICINE | Facility: CLINIC | Age: 43
End: 2018-05-31
Payer: OTHER GOVERNMENT

## 2018-05-31 VITALS — OXYGEN SATURATION: 97 % | SYSTOLIC BLOOD PRESSURE: 111 MMHG | HEART RATE: 73 BPM | DIASTOLIC BLOOD PRESSURE: 77 MMHG

## 2018-05-31 DIAGNOSIS — L25.9 CONTACT DERMATITIS, UNSPECIFIED CONTACT DERMATITIS TYPE, UNSPECIFIED TRIGGER: Primary | ICD-10-CM

## 2018-05-31 DIAGNOSIS — L66.10 LICHEN PLANOPILARIS: ICD-10-CM

## 2018-05-31 DIAGNOSIS — L29.9 LOCALIZED PRURITUS: ICD-10-CM

## 2018-05-31 PROCEDURE — 11900 INJECT SKIN LESIONS </W 7: CPT | Performed by: PHYSICIAN ASSISTANT

## 2018-05-31 PROCEDURE — 99214 OFFICE O/P EST MOD 30 MIN: CPT | Mod: 25 | Performed by: PHYSICIAN ASSISTANT

## 2018-05-31 RX ORDER — CLOBETASOL PROPIONATE 0.5 MG/G
CREAM TOPICAL
Qty: 60 G | Refills: 0 | Status: SHIPPED | OUTPATIENT
Start: 2018-05-31 | End: 2023-12-14

## 2018-05-31 NOTE — PATIENT INSTRUCTIONS
Today we injected Kenalog. Kenalog is an antiinflammatory medication. We can do injections every four weeks as needed. Atrophy (thinning of the skin) and pigment changes can occur.

## 2018-05-31 NOTE — MR AVS SNAPSHOT
After Visit Summary   5/31/2018    Melissa Painter    MRN: [id number]           Patient Information     Date Of Birth          1975        Visit Information        Provider Department      5/31/2018 9:00 AM Lauren Sanchez PA-C [facility]        Care Instructions    Today we injected Kenalog. Kenalog is an antiinflammatory medication. We can do injections every four weeks as needed. Atrophy (thinning of the skin) and pigment changes can occur.              Follow-ups after your visit        Who to contact     If you have questions or need follow up information about today's clinic visit or your schedule please contact [facility] directly at [phone].  Normal or non-critical lab and imaging results will be communicated to you by Btiqueshart, letter or phone within 4 business days after the clinic has received the results. If you do not hear from us within 7 days, please contact the clinic through Btiqueshart or phone. If you have a critical or abnormal lab result, we will notify you by phone as soon as possible.  Submit refill requests through NoWait or call your pharmacy and they will forward the refill request to us. Please allow 3 business days for your refill to be completed.          Additional Information About Your Visit        MyChart Information     NoWait gives you secure access to your electronic health record. If you see a primary care provider, you can also send messages to your care team and make appointments. If you have questions, please call your primary care clinic.  If you do not have a primary care provider, please call [phone] and they will assist you.        Care EveryWhere ID     This is your Care EveryWhere ID. This could be used by other organizations to access your [facility] [place] medical records  [id number]        Your Vitals Were     Pulse Last Period Pulse Oximetry Breastfeeding?          73 05/21/2018 (Exact Date) 97% No          Blood Pressure from Last 3 Encounters:   05/31/18 111/77   04/27/18 126/85   04/24/18 120/79    Weight from Last 3 Encounters:   04/27/18 202 lb (91.6 kg)   03/27/18 202 lb (91.6 kg)   03/20/18 202 lb (91.6 kg)              Today, you had the following     No orders found for display       Primary Care Provider Fax #    Physician No Ref-Primary [phone]       No address on file        Equal Access to Services     PENNY THIBODEAUX : Hadii aad ku hadasho Soomaali, waaxda luqadaha, qaybta kaalmada adeegyada, waxay gladisin haydarlinn tamar munoz . So [facility] [phone].    ATENCIÓN: Si habla español, tiene a edwards disposición servicios gratuitos de asistencia lingüística. [facility] [phone].    We comply with applicable federal civil rights laws and [place] laws. We do not discriminate on the basis of race, color, national origin, age, disability, sex, sexual orientation, or gender identity.            Thank you!     Thank you for choosing [facility]  for your care. Our goal is always to provide you with excellent care. Hearing back from our patients is one way we can continue to improve our services. Please take a few minutes to complete the written survey that you may receive in the mail after your visit with us. Thank you!             Your Updated Medication List - Protect others around you: Learn how to safely use, store and throw away your medicines at www.disposemymeds.org.          This list is accurate as of 5/31/18  9:23 AM.  Always use your most recent med list.                   Brand Name Dispense Instructions for use Diagnosis    CALCIUM 1000 + D 1000-800 MG-UNIT Tabs   Generic drug:  Calcium Carb-Cholecalciferol     100 tablet    TAKE ONE TABLET BY MOUTH EVERY DAY WITH FOOD FOR BONE HEALTH AND VITAMIN D SUPPLEMENTATION    Chronic fatigue, Vitamin D deficiency       cetirizine 10 MG tablet    zyrTEC    90 tablet    TAKE ONE TABLET BY MOUTH EVERY DAY TO TREAT NASAL ALLERGIES     Seasonal allergic rhinitis       cholecalciferol 5000 units Caps     90 capsule    Take 1 capsule (5,000 Units) by mouth daily INDICATION: LOW VITAMIN D, TAKE WITH FOOD    Chronic fatigue       clobetasol 0.05 % external solution    TEMOVATE    50 mL    Apply topically At Bedtime and rub into affected areas of scalp. Never to be used on face nor groin.    Lichen planopilaris       diclofenac 75 MG EC tablet    VOLTAREN    30 tablet    Take 1 tablet (75 mg) by mouth 2 times daily as needed for moderate pain    Acute right-sided low back pain without sciatica, SI (sacroiliac) joint dysfunction       ferrous fumarate 65 mg ([place]. FE)-Vitamin C 125 mg  MG Tabs tablet    VITRON C    100 tablet    Take 1 tablet by mouth every morning (before breakfast) INDICATION: IRON SUPPLEMENT    Chronic fatigue, Iron deficiency anemia, unspecified iron deficiency anemia type       fluticasone 50 MCG/ACT spray    FLONASE    16 g    Spray 2 sprays in nostril At Bedtime INDICATION: TO CONTROL NASAL ALLERGY SYMPTOMS    Seasonal allergic rhinitis       IBUPROFEN PO           PRENATAL LOW IRON 27-1 MG Tabs     90 tablet    Take 1 tablet by mouth daily INDICATION: VITAMIN SUPPLEMENTATION    Iron deficiency anemia, unspecified iron deficiency anemia type, Menorrhagia with regular cycle

## 2018-05-31 NOTE — LETTER
5/31/2018         RE: Melissa Painter  [address]  [place] [address]        Dear Colleague,    Thank you for referring your patient, Melissa Painter, to the [facility]. Please see a copy of my visit note below.    HPI:  Melissa Painter is a 43 year old year old female patient here today for follow up LPP. She had steroid injections into scalp, Kenalog 10-2ml on  3/27/18 with great improvement. Was seen on 4/24/18 and no treatment was needed at the time. Since that appt pt has developed itching of areas affected by LPP. She has tried washing scalp, tea tree oil and coconut oil with no relief. Pt would like injections today.     Alleviating/aggravating factors: no additional    Associated symptoms: none  Additional findings:itchy scaly hands. Works in medicine and wears gloves all day. Has not tried anything for this. Washing hands aggravates rash.   Patient has no other skin complaints today.  Remainder of the HPI, Meds, PMH, Allergies, FH, and SH was reviewed in chart.      Past Medical History:   Diagnosis Date     Anemia      Chronic fatigue 4/7/2016     Urgency-frequency syndrome        Past Surgical History:   Procedure Laterality Date     AS HYSTEROS W PERMANENT FALLOPAIN IMPLANT  2013    ESSURE     wisdom teeth          Family History   Problem Relation Age of Onset     Hypertension Mother        Social History     Social History     Marital status:      Spouse name: N/A     Number of children: N/A     Years of education: N/A     Occupational History     CNA      Social History Main Topics     Smoking status: Never Smoker     Smokeless tobacco: Never Used     Alcohol use 0.0 oz/week     0 Standard drinks or equivalent per week      Comment: 2-3 glasses wine/yr     Drug use: No     Sexual activity: Yes     Partners: Male      Comment: [facility]     Other Topics Concern     Not on file     Social History Narrative       Outpatient Encounter Prescriptions as of 5/31/2018   Medication  Sig Dispense Refill     CALCIUM 1000 + D 1000-800 MG-UNIT TABS TAKE ONE TABLET BY MOUTH EVERY DAY WITH FOOD FOR BONE HEALTH AND VITAMIN D SUPPLEMENTATION 100 tablet 3     cetirizine (ZYRTEC) 10 MG tablet TAKE ONE TABLET BY MOUTH EVERY DAY TO TREAT NASAL ALLERGIES 90 tablet 2     cholecalciferol 5000 UNITS CAPS Take 1 capsule (5,000 Units) by mouth daily INDICATION: LOW VITAMIN D, TAKE WITH FOOD 90 capsule 3     clobetasol (TEMOVATE) 0.05 % cream Apply a thin layer to aa on hands for 1-2 weeks. Tapering with improvement and using during flares. Do not use on face or body folds 60 g 0     clobetasol (TEMOVATE) 0.05 % external solution Apply topically At Bedtime and rub into affected areas of scalp. Never to be used on face nor groin. 50 mL 1     diclofenac (VOLTAREN) 75 MG EC tablet Take 1 tablet (75 mg) by mouth 2 times daily as needed for moderate pain 30 tablet 1     ferrous fumarate 65 mg, [place]. FE,-Vitamin C 125 mg (VITRON C)  MG TABS tablet Take 1 tablet by mouth every morning (before breakfast) INDICATION: IRON SUPPLEMENT 100 tablet prn     fluticasone (FLONASE) 50 MCG/ACT nasal spray Spray 2 sprays in nostril At Bedtime INDICATION: TO CONTROL NASAL ALLERGY SYMPTOMS 16 g PRN     IBUPROFEN PO        Prenatal Vit-Fe Fumarate-FA (PRENATAL LOW IRON) 27-1 MG TABS Take 1 tablet by mouth daily INDICATION: VITAMIN SUPPLEMENTATION 90 tablet prn     [DISCONTINUED] methocarbamol (ROBAXIN) 750 MG tablet Take 1 tablet (750 mg) by mouth 3 times daily as needed for muscle spasms (Patient not taking: Reported on 4/27/2018) 60 tablet 0     No facility-administered encounter medications on file as of 5/31/2018.        Review Of Systems:  Skin: As above  Eyes: negative  Ears/Nose/Throat: negative  Respiratory: No shortness of breath, dyspnea on exertion, cough, or hemoptysis  Cardiovascular: negative  Gastrointestinal: negative  Genitourinary: negative  Musculoskeletal: negative  Neurologic: negative  Psychiatric:  negative  Hematologic/Lymphatic/Immunologic: negative  Endocrine: negative      Objective:     /77  Pulse 73  LMP 05/21/2018 (Exact Date)  SpO2 97%  Breastfeeding? No  Eyes: Conjunctivae/lids: Normal   ENT: Lips:  Normal  MSK: Normal  Pulm: Breathing Normal  Neuro/Psych: Orientation: Normal; Mood/Affect: Normal, NAD, WDWN  Following areas examined: face, scalp, hands  Findings:    1) large dyspigmented smooth indurated patch on superior parietal and frontal scalp, small patch on right occipital scalp.   2) light brown scaly patches on dorsal hands    Assessment and Plan:  1) LPP  Injected 3.0cc of Kenalog 10 into scalp patches. Disc permanent AE such as hypopigmentation and atrophy. May need additional treatment. May not be effective.   Lot #:[id number]  Exp: April 2019  Can use topical clobetasol solution to scalp between steroid injection appts if needed for itch.   2) contact dermatitis and pruritis of hands  Disc etiology and course.  Begin clobetasol cream to affected area on hands BID x 1-2 weeks. Tapering with improvement.   Side effects of topical steroids including but not limited to atrophy (skin thinning), striae (stretch marks) telangiectasias, steroid acne, and others.       Follow up in 1-2 months      Again, thank you for allowing me to participate in the care of your patient.        Sincerely,        Lauren Sanchez PA-C

## 2018-05-31 NOTE — PROGRESS NOTES
HPI:  Melissa Painter is a 43 year old year old female patient here today for follow up LPP. She had steroid injections into scalp, Kenalog 10-2ml on  3/27/18 with great improvement. Was seen on 4/24/18 and no treatment was needed at the time. Since that appt pt has developed itching of areas affected by LPP. She has tried washing scalp, tea tree oil and coconut oil with no relief. Pt would like injections today.     Alleviating/aggravating factors: no additional    Associated symptoms: none  Additional findings:itchy scaly hands. Works in medicine and wears gloves all day. Has not tried anything for this. Washing hands aggravates rash.   Patient has no other skin complaints today.  Remainder of the HPI, Meds, PMH, Allergies, FH, and SH was reviewed in chart.      Past Medical History:   Diagnosis Date     Anemia      Chronic fatigue 4/7/2016     Urgency-frequency syndrome        Past Surgical History:   Procedure Laterality Date     AS HYSTEROS W PERMANENT FALLOPAIN IMPLANT  2013    ESSURE     wisdom teeth          Family History   Problem Relation Age of Onset     Hypertension Mother        Social History     Social History     Marital status:      Spouse name: N/A     Number of children: N/A     Years of education: N/A     Occupational History     CNA      Social History Main Topics     Smoking status: Never Smoker     Smokeless tobacco: Never Used     Alcohol use 0.0 oz/week     0 Standard drinks or equivalent per week      Comment: 2-3 glasses wine/yr     Drug use: No     Sexual activity: Yes     Partners: Male      Comment: [facility]     Other Topics Concern     Not on file     Social History Narrative       Outpatient Encounter Prescriptions as of 5/31/2018   Medication Sig Dispense Refill     CALCIUM 1000 + D 1000-800 MG-UNIT TABS TAKE ONE TABLET BY MOUTH EVERY DAY WITH FOOD FOR BONE HEALTH AND VITAMIN D SUPPLEMENTATION 100 tablet 3     cetirizine (ZYRTEC) 10 MG tablet TAKE ONE TABLET BY MOUTH EVERY DAY  TO TREAT NASAL ALLERGIES 90 tablet 2     cholecalciferol 5000 UNITS CAPS Take 1 capsule (5,000 Units) by mouth daily INDICATION: LOW VITAMIN D, TAKE WITH FOOD 90 capsule 3     clobetasol (TEMOVATE) 0.05 % cream Apply a thin layer to aa on hands for 1-2 weeks. Tapering with improvement and using during flares. Do not use on face or body folds 60 g 0     clobetasol (TEMOVATE) 0.05 % external solution Apply topically At Bedtime and rub into affected areas of scalp. Never to be used on face nor groin. 50 mL 1     diclofenac (VOLTAREN) 75 MG EC tablet Take 1 tablet (75 mg) by mouth 2 times daily as needed for moderate pain 30 tablet 1     ferrous fumarate 65 mg, [place]. FE,-Vitamin C 125 mg (VITRON C)  MG TABS tablet Take 1 tablet by mouth every morning (before breakfast) INDICATION: IRON SUPPLEMENT 100 tablet prn     fluticasone (FLONASE) 50 MCG/ACT nasal spray Spray 2 sprays in nostril At Bedtime INDICATION: TO CONTROL NASAL ALLERGY SYMPTOMS 16 g PRN     IBUPROFEN PO        Prenatal Vit-Fe Fumarate-FA (PRENATAL LOW IRON) 27-1 MG TABS Take 1 tablet by mouth daily INDICATION: VITAMIN SUPPLEMENTATION 90 tablet prn     [DISCONTINUED] methocarbamol (ROBAXIN) 750 MG tablet Take 1 tablet (750 mg) by mouth 3 times daily as needed for muscle spasms (Patient not taking: Reported on 4/27/2018) 60 tablet 0     No facility-administered encounter medications on file as of 5/31/2018.        Review Of Systems:  Skin: As above  Eyes: negative  Ears/Nose/Throat: negative  Respiratory: No shortness of breath, dyspnea on exertion, cough, or hemoptysis  Cardiovascular: negative  Gastrointestinal: negative  Genitourinary: negative  Musculoskeletal: negative  Neurologic: negative  Psychiatric: negative  Hematologic/Lymphatic/Immunologic: negative  Endocrine: negative      Objective:     /77  Pulse 73  LMP 05/21/2018 (Exact Date)  SpO2 97%  Breastfeeding? No  Eyes: Conjunctivae/lids: Normal   ENT: Lips:  Normal  MSK:  Normal  Pulm: Breathing Normal  Neuro/Psych: Orientation: Normal; Mood/Affect: Normal, NAD, WDWN  Following areas examined: face, scalp, hands  Findings:    1) large dyspigmented smooth indurated patch on superior parietal and frontal scalp, small patch on right occipital scalp.   2) light brown scaly patches on dorsal hands    Assessment and Plan:  1) LPP  Injected 3.0cc of Kenalog 10 into scalp patches. Disc permanent AE such as hypopigmentation and atrophy. May need additional treatment. May not be effective.   Lot #:[id number]  Exp: April 2019  Can use topical clobetasol solution to scalp between steroid injection appts if needed for itch.   2) contact dermatitis and pruritis of hands  Disc etiology and course.  Begin clobetasol cream to affected area on hands BID x 1-2 weeks. Tapering with improvement.   Side effects of topical steroids including but not limited to atrophy (skin thinning), striae (stretch marks) telangiectasias, steroid acne, and others.       Follow up in 1-2 months

## 2018-06-25 ENCOUNTER — OFFICE VISIT (OUTPATIENT)
Dept: OBGYN | Facility: CLINIC | Age: 43
End: 2018-06-25
Payer: OTHER GOVERNMENT

## 2018-06-25 VITALS
HEIGHT: 66 IN | DIASTOLIC BLOOD PRESSURE: 78 MMHG | BODY MASS INDEX: 35.36 KG/M2 | SYSTOLIC BLOOD PRESSURE: 96 MMHG | WEIGHT: 220 LBS

## 2018-06-25 DIAGNOSIS — N81.11 CYSTOCELE, MIDLINE: ICD-10-CM

## 2018-06-25 DIAGNOSIS — N92.6 IRREGULAR MENSTRUAL CYCLE: Primary | ICD-10-CM

## 2018-06-25 DIAGNOSIS — R63.5 ABNORMAL WEIGHT GAIN: ICD-10-CM

## 2018-06-25 DIAGNOSIS — N94.10 DYSPAREUNIA IN FEMALE: ICD-10-CM

## 2018-06-25 DIAGNOSIS — L65.9 HAIR LOSS: ICD-10-CM

## 2018-06-25 LAB
BASOPHILS # BLD AUTO: 0 10E9/L (ref 0–0.2)
BASOPHILS NFR BLD AUTO: 0.5 %
BETA HCG QUAL IFA URINE: NEGATIVE
DIFFERENTIAL METHOD BLD: ABNORMAL
EOSINOPHIL # BLD AUTO: 0.2 10E9/L (ref 0–0.7)
EOSINOPHIL NFR BLD AUTO: 3 %
ERYTHROCYTE [DISTWIDTH] IN BLOOD BY AUTOMATED COUNT: 17 % (ref 10–15)
HCT VFR BLD AUTO: 34.9 % (ref 35–47)
HGB BLD-MCNC: 11.5 G/DL (ref 11.7–15.7)
LYMPHOCYTES # BLD AUTO: 2.1 10E9/L (ref 0.8–5.3)
LYMPHOCYTES NFR BLD AUTO: 36.3 %
MCH RBC QN AUTO: 24.8 PG (ref 26.5–33)
MCHC RBC AUTO-ENTMCNC: 33 G/DL (ref 31.5–36.5)
MCV RBC AUTO: 75 FL (ref 78–100)
MONOCYTES # BLD AUTO: 0.5 10E9/L (ref 0–1.3)
MONOCYTES NFR BLD AUTO: 8.5 %
NEUTROPHILS # BLD AUTO: 2.9 10E9/L (ref 1.6–8.3)
NEUTROPHILS NFR BLD AUTO: 51.7 %
PLATELET # BLD AUTO: 260 10E9/L (ref 150–450)
RBC # BLD AUTO: 4.63 10E12/L (ref 3.8–5.2)
WBC # BLD AUTO: 5.7 10E9/L (ref 4–11)

## 2018-06-25 PROCEDURE — 36415 COLL VENOUS BLD VENIPUNCTURE: CPT | Performed by: ADVANCED PRACTICE MIDWIFE

## 2018-06-25 PROCEDURE — 85025 COMPLETE CBC W/AUTO DIFF WBC: CPT | Performed by: ADVANCED PRACTICE MIDWIFE

## 2018-06-25 PROCEDURE — 86376 MICROSOMAL ANTIBODY EACH: CPT | Performed by: ADVANCED PRACTICE MIDWIFE

## 2018-06-25 PROCEDURE — 87591 N.GONORRHOEAE DNA AMP PROB: CPT | Performed by: ADVANCED PRACTICE MIDWIFE

## 2018-06-25 PROCEDURE — 99214 OFFICE O/P EST MOD 30 MIN: CPT | Performed by: ADVANCED PRACTICE MIDWIFE

## 2018-06-25 PROCEDURE — 84703 CHORIONIC GONADOTROPIN ASSAY: CPT | Performed by: ADVANCED PRACTICE MIDWIFE

## 2018-06-25 PROCEDURE — 84439 ASSAY OF FREE THYROXINE: CPT | Performed by: ADVANCED PRACTICE MIDWIFE

## 2018-06-25 PROCEDURE — 84443 ASSAY THYROID STIM HORMONE: CPT | Performed by: ADVANCED PRACTICE MIDWIFE

## 2018-06-25 PROCEDURE — 87491 CHLMYD TRACH DNA AMP PROBE: CPT | Performed by: ADVANCED PRACTICE MIDWIFE

## 2018-06-25 NOTE — PROGRESS NOTES
SUBJECTIVE:                                                   Melissa Painter is a 43 year old  who presents to clinic today for the following health issue(s):  Patient presents with:  Consult: period for over a month    HPI: Patient reports ongoing problems since Essure sterilization in . States that she has had heavier than normal periods for the past 5 years. She also is experiencing weight gain (20 pounds since April) and hair loss. States weight gain is despite beginning exercise and eating normally. States that periods became irregular 2 months ago. She had a normal period on 2018 which lasted 3-4 days. Several days after the period ended, heavy dark red blood and clots started. States on the first few days of this bleeding, she soaked a pad in an  Hour, but it has since slowed down. She has been bleeding for the past month. Reports she has been tired. Denies dizziness and palpitations. Denies STI history. Denies abnormal Paps. Reports history of a small ovarian cyst that resolved, otherwise no GYN abnormalities. Has not had intercourse since bleeding started.      LMP: 2018 (normal period)  Menstrual History: irregular, see HPI  Patient is sexually active  .  Using tubal ligation for contraception.   Health maintenance updated:  yes  STI infx testing offered:  Accepted      Problem list and histories reviewed & adjusted, as indicated.  Additional history: as documented.    Patient Active Problem List   Diagnosis     Iron deficiency anemia, unspecified iron deficiency     Chronic fatigue     Vitamin D deficiency     Hair loss     Immunity status testing     Excessive or frequent menstruation     CARDIOVASCULAR SCREENING; LDL GOAL LESS THAN 160     Seasonal allergic rhinitis     Rectocele     Cystocele, midline     Urgency-frequency syndrome     Cervicalgia     Acute bilateral low back pain without sciatica     Hepatitis B carrier (H)     Acute right-sided low back pain without  sciatica     Past Surgical History:   Procedure Laterality Date     AS HYSTEROS W PERMANENT FALLOPAIN IMPLANT  2013    ESSURE     wisdom teeth        Social History   Substance Use Topics     Smoking status: Never Smoker     Smokeless tobacco: Never Used     Alcohol use 0.0 oz/week     0 Standard drinks or equivalent per week      Comment: 2-3 glasses wine/yr      Problem (# of Occurrences) Relation (Name,Age of Onset)    Hypertension (1) Mother            Current Outpatient Prescriptions   Medication Sig     CALCIUM 1000 + D 1000-800 MG-UNIT TABS TAKE ONE TABLET BY MOUTH EVERY DAY WITH FOOD FOR BONE HEALTH AND VITAMIN D SUPPLEMENTATION     cetirizine (ZYRTEC) 10 MG tablet TAKE ONE TABLET BY MOUTH EVERY DAY TO TREAT NASAL ALLERGIES     cholecalciferol 5000 UNITS CAPS Take 1 capsule (5,000 Units) by mouth daily INDICATION: LOW VITAMIN D, TAKE WITH FOOD     clobetasol (TEMOVATE) 0.05 % cream Apply a thin layer to aa on hands for 1-2 weeks. Tapering with improvement and using during flares. Do not use on face or body folds     clobetasol (TEMOVATE) 0.05 % external solution Apply topically At Bedtime and rub into affected areas of scalp. Never to be used on face nor groin.     diclofenac (VOLTAREN) 75 MG EC tablet Take 1 tablet (75 mg) by mouth 2 times daily as needed for moderate pain     ferrous fumarate 65 mg, [place]. FE,-Vitamin C 125 mg (VITRON C)  MG TABS tablet Take 1 tablet by mouth every morning (before breakfast) INDICATION: IRON SUPPLEMENT     fluticasone (FLONASE) 50 MCG/ACT nasal spray Spray 2 sprays in nostril At Bedtime INDICATION: TO CONTROL NASAL ALLERGY SYMPTOMS     IBUPROFEN PO      Prenatal Vit-Fe Fumarate-FA (PRENATAL LOW IRON) 27-1 MG TABS Take 1 tablet by mouth daily INDICATION: VITAMIN SUPPLEMENTATION     No current facility-administered medications for this visit.      Allergies   Allergen Reactions     Levaquin [Levofloxacin] Itching     Influenza Vaccine Live        ROS:  12 point review  "of systems negative other than symptoms noted below.  Constitutional: Fatigue and Weight Gain  Genitourinary: Heavy Bleeding with Period, Irregular Menses, Painful [place] and Inability to empty bladder completely  Endocrine: Loss of Hair    OBJECTIVE:     BP 96/78  Ht 5' 6\" (1.676 m)  Wt 220 lb (99.8 kg)  LMP  (LMP Unknown)  Breastfeeding? No  BMI 35.51 kg/m2  Body mass index is 35.51 kg/(m^2).    PHYSICAL EXAM:  Constitutional:  Appearance: Well nourished, well developed alert, in no acute distress  Neck:  Lymph Nodes:  No lymphadenopathy present; Thyroid:  Gland size normal, nontender, no nodules or masses present on palpation  Chest:  Respiratory Effort:  Breathing unlabored  Head: Bald patches noted on head  Neurologic/Psychiatric:  Mental Status:  Oriented X3     Pelvic:   Vagina: Smooth, pink, ruggaeted, moderate amount of dark red blood in the vaginal vault  Pelvic floor: Weakened pelvic floor, cystocele grade 2, no rectocele  Cervix: no lesions, no discharge, non-tender  Ovaries: no masses appreciated and nontender  Uterus: anteverted, smooth contour, without enlargement, mobile and without tenderness  Rectal:  deferred         ASSESSMENT/PLAN:                                                        ICD-10-CM    1. Irregular menstrual cycle N92.6 CBC with platelets and differential     Beta HCG qual IFA urine     US Pelvic Complete w Transvaginal     TSH     T4 free     Thyroid peroxidase antibody     Neisseria gonorrhoeae PCR     Chlamydia trachomatis PCR   2. Abnormal weight gain R63.5 TSH     T4 free     Thyroid peroxidase antibody   3. Hair loss L65.9 TSH     T4 free     Thyroid peroxidase antibody   4. Dyspareunia in female N94.10 PHYSICAL THERAPY REFERRAL   5. Cystocele, midline N81.11 PHYSICAL THERAPY REFERRAL     Will follow up with results to evaluate irregular menses. Possible referral to OB-GYN at that time.   Offered Depo Provera to stop bleeding today. Patient declines, stating she has " had it before and did not like it.   Pelvic US   CBC to assess for anemia  Full thyroid panel for endocrine symptoms- weight gain (20 pounds in 4 months) and hair loss  Recommended Kegels for cystocele. Referral to pelvic floor physical therapy.   Return to clinic if symptoms persist or worsen.       30 minutes was spent face to face with the patient today discussing her history, diagnosis, and follow-up plan as noted above. Over 50% of the visit was spent in counseling and coordination of care of irregular bleeding, cystocele, and endocrine symptoms-weight gain and hair loss.    Emily Zarco CNM, WHGIBSON-BC

## 2018-06-25 NOTE — MR AVS SNAPSHOT
"              After Visit Summary   6/25/2018    Melissa Painter    MRN: [id number]           Patient Information     Date Of Birth          1975        Visit Information        Provider Department      6/25/2018 6:15 PM Emily Zarco CNM [facility]        Today's Diagnoses     Irregular menstrual cycle    -  1    Abnormal weight gain        Hair loss        Dyspareunia in female        Cystocele, midline           Follow-ups after your visit        Additional Services     PHYSICAL THERAPY REFERRAL       *This therapy referral will be filtered to a centralized scheduling office at [facility] and the patient will receive a call to schedule an appointment at a [facility] location most convenient for them. *     [facility] provides Physical Therapy evaluation and treatment and many specialty services across the [facility] system.  If requesting a specialty program, please choose from the list below.    If you have not heard from the scheduling office within 2 business days, please call [phone] for all locations, with the exception of [place], please call [phone] and [facility], please call [phone]  Treatment: Evaluation & Treatment  Special Instructions/Modalities: Pelvic floor  Special Programs: Incontinence Pelvic Floor Program    Please be aware that coverage of these services is subject to the terms and limitations of your health insurance plan.  Call member services at your health plan with any benefit or coverage questions.      **Note to Provider:  If you are referring outside of [facility] for the therapy appointment, please list the name of the location in the \"special instructions\" above, print the referral and give to the patient to schedule the appointment.                  Follow-up notes from your care team     Return if symptoms worsen or fail to improve.      Future tests that were ordered for you today     " "Open Future Orders        Priority Expected Expires Ordered    US Pelvic Complete w Transvaginal Routine  6/25/2019 6/25/2018            Who to contact     If you have questions or need follow up information about today's clinic visit or your schedule please contact [facility] directly at [phone].  Normal or non-critical lab and imaging results will be communicated to you by MyChart, letter or phone within 4 business days after the clinic has received the results. If you do not hear from us within 7 days, please contact the clinic through "[employer]hart or phone. If you have a critical or abnormal lab result, we will notify you by phone as soon as possible.  Submit refill requests through [employer] or call your pharmacy and they will forward the refill request to us. Please allow 3 business days for your refill to be completed.          Additional Information About Your Visit        MyChart Information     [employer] gives you secure access to your electronic health record. If you see a primary care provider, you can also send messages to your care team and make appointments. If you have questions, please call your primary care clinic.  If you do not have a primary care provider, please call [phone] and they will assist you.        Care EveryWhere ID     This is your Care EveryWhere ID. This could be used by other organizations to access your [facility] medical records  [id number]        Your Vitals Were     Height Last Period Breastfeeding? BMI (Body Mass Index)          5' 6\" (1.676 m) (LMP Unknown) No 35.51 kg/m2         Blood Pressure from Last 3 Encounters:   06/25/18 96/78   05/31/18 111/77   04/27/18 126/85    Weight from Last 3 Encounters:   06/25/18 220 lb (99.8 kg)   04/27/18 202 lb (91.6 kg)   03/27/18 202 lb (91.6 kg)              We Performed the Following     Beta HCG qual IFA urine     CBC with platelets and differential     Chlamydia trachomatis PCR     Neisseria gonorrhoeae " PCR     PHYSICAL THERAPY REFERRAL     T4 free     Thyroid peroxidase antibody     TSH        Primary Care Provider Fax #    Physician No Ref-Primary [phone]       No address on file        Equal Access to Services     PENNY THIBODEAUX : Hadii aad ku hadkimberlynina Viera, annabellafilomena rosemikeha, gayathri mamadouterry torre, prakash lovinglaura noguera. So [facility] [phone].    ATENCIÓN: Si habla español, tiene a edwards disposición servicios gratuitos de asistencia lingüística. Llame al [phone].    We comply with applicable federal civil rights laws and [place] laws. We do not discriminate on the basis of race, color, national origin, age, disability, sex, sexual orientation, or gender identity.            Thank you!     Thank you for choosing [facility]  for your care. Our goal is always to provide you with excellent care. Hearing back from our patients is one way we can continue to improve our services. Please take a few minutes to complete the written survey that you may receive in the mail after your visit with us. Thank you!             Your Updated Medication List - Protect others around you: Learn how to safely use, store and throw away your medicines at www.disposemymeds.org.          This list is accurate as of 6/25/18  7:00 PM.  Always use your most recent med list.                   Brand Name Dispense Instructions for use Diagnosis    CALCIUM 1000 + D 1000-800 MG-UNIT Tabs   Generic drug:  Calcium Carb-Cholecalciferol     100 tablet    TAKE ONE TABLET BY MOUTH EVERY DAY WITH FOOD FOR BONE HEALTH AND VITAMIN D SUPPLEMENTATION    Chronic fatigue, Vitamin D deficiency       cetirizine 10 MG tablet    zyrTEC    90 tablet    TAKE ONE TABLET BY MOUTH EVERY DAY TO TREAT NASAL ALLERGIES    Seasonal allergic rhinitis       cholecalciferol 5000 units Caps     90 capsule    Take 1 capsule (5,000 Units) by mouth daily INDICATION: LOW VITAMIN D, TAKE WITH FOOD    Chronic fatigue       *  clobetasol 0.05 % external solution    TEMOVATE    50 mL    Apply topically At Bedtime and rub into affected areas of scalp. Never to be used on face nor groin.    Lichen planopilaris       * clobetasol 0.05 % cream    TEMOVATE    60 g    Apply a thin layer to aa on hands for 1-2 weeks. Tapering with improvement and using during flares. Do not use on face or body folds    Contact dermatitis, unspecified contact dermatitis type, unspecified trigger       diclofenac 75 MG EC tablet    VOLTAREN    30 tablet    Take 1 tablet (75 mg) by mouth 2 times daily as needed for moderate pain    Acute right-sided low back pain without sciatica, SI (sacroiliac) joint dysfunction       ferrous fumarate 65 mg ([place]. FE)-Vitamin C 125 mg  MG Tabs tablet    VITRON C    100 tablet    Take 1 tablet by mouth every morning (before breakfast) INDICATION: IRON SUPPLEMENT    Chronic fatigue, Iron deficiency anemia, unspecified iron deficiency anemia type       fluticasone 50 MCG/ACT spray    FLONASE    16 g    Spray 2 sprays in nostril At Bedtime INDICATION: TO CONTROL NASAL ALLERGY SYMPTOMS    Seasonal allergic rhinitis       IBUPROFEN PO           PRENATAL LOW IRON 27-1 MG Tabs     90 tablet    Take 1 tablet by mouth daily INDICATION: VITAMIN SUPPLEMENTATION    Iron deficiency anemia, unspecified iron deficiency anemia type, Menorrhagia with regular cycle       * Notice:  This list has 2 medication(s) that are the same as other medications prescribed for you. Read the directions carefully, and ask your doctor or other care provider to review them with you.

## 2018-06-25 NOTE — NURSING NOTE
"Chief Complaint   Patient presents with     Consult     period for over a month       Initial BP 96/78  Ht 5' 6\" (1.676 m)  Wt 220 lb (99.8 kg)  LMP  (LMP Unknown)  Breastfeeding? No  BMI 35.51 kg/m2 Estimated body mass index is 35.51 kg/(m^2) as calculated from the following:    Height as of this encounter: 5' 6\" (1.676 m).    Weight as of this encounter: 220 lb (99.8 kg).  BP completed using cuff size: large        Lauren Jacobsen CMA                "

## 2018-06-26 LAB
T4 FREE SERPL-MCNC: 0.99 NG/DL (ref 0.76–1.46)
TSH SERPL DL<=0.005 MIU/L-ACNC: 0.8 MU/L (ref 0.4–4)

## 2018-06-27 LAB
C TRACH DNA SPEC QL NAA+PROBE: NEGATIVE
N GONORRHOEA DNA SPEC QL NAA+PROBE: NEGATIVE
SPECIMEN SOURCE: NORMAL
SPECIMEN SOURCE: NORMAL
THYROPEROXIDASE AB SERPL-ACNC: <10 IU/ML

## 2018-07-05 ENCOUNTER — RADIANT APPOINTMENT (OUTPATIENT)
Dept: ULTRASOUND IMAGING | Facility: CLINIC | Age: 43
End: 2018-07-05
Attending: ADVANCED PRACTICE MIDWIFE
Payer: OTHER GOVERNMENT

## 2018-07-05 DIAGNOSIS — N92.6 IRREGULAR MENSTRUAL CYCLE: ICD-10-CM

## 2018-07-05 PROCEDURE — 76856 US EXAM PELVIC COMPLETE: CPT | Performed by: FAMILY MEDICINE

## 2018-07-05 PROCEDURE — 76830 TRANSVAGINAL US NON-OB: CPT | Performed by: FAMILY MEDICINE

## 2018-07-09 ENCOUNTER — MYC MEDICAL ADVICE (OUTPATIENT)
Dept: OBGYN | Facility: CLINIC | Age: 43
End: 2018-07-09

## 2018-07-09 NOTE — TELEPHONE ENCOUNTER
Called patient per MyChart request. Patient states she is having heavy vaginal bleeding with clots x 2 days. States she does soak a pad through in less than an hour. Feeling very fatigued. Strongly recommended to go to ED for evaluation today.     Reviewed pelvic US with patient and recommended follow up US in 8 weeks for resolution of simple ovarian cyst. Patient requesting reversal of Essure due to ongoing heavy bleeding. Recommended follow up with OB-GYN to discuss options.     Emily Zarco CNM, DENISE-BC

## 2018-07-11 ENCOUNTER — OFFICE VISIT (OUTPATIENT)
Dept: OBGYN | Facility: CLINIC | Age: 43
End: 2018-07-11
Payer: OTHER GOVERNMENT

## 2018-07-11 VITALS
SYSTOLIC BLOOD PRESSURE: 110 MMHG | BODY MASS INDEX: 34.07 KG/M2 | WEIGHT: 212 LBS | DIASTOLIC BLOOD PRESSURE: 60 MMHG | HEIGHT: 66 IN

## 2018-07-11 DIAGNOSIS — N92.0 MENORRHAGIA WITH REGULAR CYCLE: ICD-10-CM

## 2018-07-11 DIAGNOSIS — R53.83 FATIGUE, UNSPECIFIED TYPE: ICD-10-CM

## 2018-07-11 DIAGNOSIS — N92.1 METRORRHAGIA: ICD-10-CM

## 2018-07-11 DIAGNOSIS — D50.8 OTHER IRON DEFICIENCY ANEMIA: Primary | ICD-10-CM

## 2018-07-11 LAB
ERYTHROCYTE [DISTWIDTH] IN BLOOD BY AUTOMATED COUNT: 16.9 % (ref 10–15)
HCT VFR BLD AUTO: 35.3 % (ref 35–47)
HGB BLD-MCNC: 11.7 G/DL (ref 11.7–15.7)
MCH RBC QN AUTO: 24.4 PG (ref 26.5–33)
MCHC RBC AUTO-ENTMCNC: 33.1 G/DL (ref 31.5–36.5)
MCV RBC AUTO: 74 FL (ref 78–100)
PLATELET # BLD AUTO: 332 10E9/L (ref 150–450)
RBC # BLD AUTO: 4.79 10E12/L (ref 3.8–5.2)
WBC # BLD AUTO: 4.3 10E9/L (ref 4–11)

## 2018-07-11 PROCEDURE — 36415 COLL VENOUS BLD VENIPUNCTURE: CPT | Performed by: NURSE PRACTITIONER

## 2018-07-11 PROCEDURE — 99213 OFFICE O/P EST LOW 20 MIN: CPT | Performed by: NURSE PRACTITIONER

## 2018-07-11 PROCEDURE — 85027 COMPLETE CBC AUTOMATED: CPT | Performed by: NURSE PRACTITIONER

## 2018-07-11 NOTE — PATIENT INSTRUCTIONS
Iron Rich Foods  Iron is an important mineral for good health. Without enough iron you are more prone to getting sick and feeling tired.  Babies/children need iron for healthy brain development.    Recommended amount of Iron for Women  Ages 14-18  15 mg  Ages 19-49  18 mg   Over 50  8 mg  Pregnancy  27 mg  Breastfeeding  9 mg  Vegetarians   33 mg    There is extra iron in multivitamins and prenatal vitamins. Sometimes women can have a lower hemoglobin (part of your blood that carries oxygen) after menses and when pregnant. Adding some of these iron rich foods to your diet can help you feel better, bring your iron levels up without supplementing with iron pills or liquids. If you are already supplementing with iron these food will only help!    Iron Rich Foods:    Meat (2.5 oz servings range anywhere from 0.6-21 mg of Iron)  Clams      Liver (chicken/pork)     Oysters    Mussels       Beef liver    Beef       Shrimp     Sardines     Tuna/herring/mackerel   Chicken  Pork     Turkey/Lamb  [place]     Flounder/sole  Vegtables  Dark green leafy vegetables like kale/mixed greens /swiss chard  Broccoli      Asparagus  Green peas      Beets  Potato (with skin)  Beans   Chickpeas      [place] Beans  Soy beans      Kidney beans   Lentils       Refried beans   Grains  Whole wheat bread     Bagels  Pasta (enriched)     Quinoa  Cereal       Shredded wheat  Cream of wheat  (12 mg)    Oatmeal  Pearled barley      Wheat germ (toasted)  Other  pumpkin seeds     Tofu  Raisins       Peanut butter  Tahini        Eggs  Sour Cherries      Prune juice

## 2018-07-11 NOTE — PROGRESS NOTES
"  SUBJECTIVE:                                                   Melissa Painter is a 43 year old who presents to clinic today for the following health issue(s):  Patient presents with:  Abnormal Bleeding Problem      HPI:  Melissa presents with her sister.  She states that she is concerned about recent increase in vaginal bleeding and intermittent sharp pelvic pain that she is feeling.  She states that she has been bleeding daily for the past 60 days, states that she has 3-4 episodes of heavy bleeding daily that is accompanied by sharp pelvic pain.  She states that when she has a bleeding episode, she bleeds through 1-2 pads in less than 1 hour and she will also pass \"large clots\".  She becomes dizzy and lightheaded with bleeding episodes.  Bleeding episodes resolve on own with no intervention.  She has tried taking 600mg ibuprofen with no relief in signs and symptoms.      She states that she is concerned that bleeding and pain is due to her Essure and would like to possibly have Essure removed.     No LMP recorded (lmp unknown). Patient is not currently having periods (Reason: Irregular Periods).  Menstrual History: irregular, constant x 60 days  Patient is sexually active  .  Using Essure for contraception.   Health maintenance updated:  yes  STI infx testing offered:  Declined; recently done      Problem list and histories reviewed & adjusted, as indicated.  Additional history: as documented.    Patient Active Problem List   Diagnosis     Iron deficiency anemia, unspecified iron deficiency     Chronic fatigue     Vitamin D deficiency     Hair loss     Immunity status testing     Excessive or frequent menstruation     CARDIOVASCULAR SCREENING; LDL GOAL LESS THAN 160     Seasonal allergic rhinitis     Rectocele     Cystocele, midline     Urgency-frequency syndrome     Cervicalgia     Acute bilateral low back pain without sciatica     Hepatitis B carrier (H)     Acute right-sided low back pain without " sciatica     Past Surgical History:   Procedure Laterality Date     AS HYSTEROS W PERMANENT FALLOPAIN IMPLANT  2013    ESSURE     wisdom teeth        Social History   Substance Use Topics     Smoking status: Never Smoker     Smokeless tobacco: Never Used     Alcohol use 0.0 oz/week     0 Standard drinks or equivalent per week      Comment: 2-3 glasses wine/yr      Problem (# of Occurrences) Relation (Name,Age of Onset)    Hypertension (1) Mother            Current Outpatient Prescriptions   Medication Sig     CALCIUM 1000 + D 1000-800 MG-UNIT TABS TAKE ONE TABLET BY MOUTH EVERY DAY WITH FOOD FOR BONE HEALTH AND VITAMIN D SUPPLEMENTATION     cetirizine (ZYRTEC) 10 MG tablet TAKE ONE TABLET BY MOUTH EVERY DAY TO TREAT NASAL ALLERGIES     cholecalciferol 5000 UNITS CAPS Take 1 capsule (5,000 Units) by mouth daily INDICATION: LOW VITAMIN D, TAKE WITH FOOD     clobetasol (TEMOVATE) 0.05 % cream Apply a thin layer to aa on hands for 1-2 weeks. Tapering with improvement and using during flares. Do not use on face or body folds     clobetasol (TEMOVATE) 0.05 % external solution Apply topically At Bedtime and rub into affected areas of scalp. Never to be used on face nor groin.     ferrous fumarate 65 mg, [place]. FE,-Vitamin C 125 mg (VITRON C)  MG TABS tablet Take 1 tablet by mouth every morning (before breakfast) INDICATION: IRON SUPPLEMENT     fluticasone (FLONASE) 50 MCG/ACT nasal spray Spray 2 sprays in nostril At Bedtime INDICATION: TO CONTROL NASAL ALLERGY SYMPTOMS     IBUPROFEN PO      Prenatal Vit-Fe Fumarate-FA (PRENATAL LOW IRON) 27-1 MG TABS Take 1 tablet by mouth daily INDICATION: VITAMIN SUPPLEMENTATION     No current facility-administered medications for this visit.      Allergies   Allergen Reactions     Levaquin [Levofloxacin] Itching     Influenza Vaccine Live        ROS:  12 point review of systems negative other than symptoms noted below.  Genitourinary: Heavy Bleeding with Period    OBJECTIVE:     BP  "110/60  Ht 5' 6\" (1.676 m)  Wt 212 lb (96.2 kg)  LMP  (LMP Unknown)  BMI 34.22 kg/m2  Body mass index is 34.22 kg/(m^2).    PHYSICAL EXAM:  Constitutional:  Appearance: Well nourished, well developed alert, in no acute distress  Neurologic/Psychiatric:  Mental Status:  Oriented X3   No Pelvic Exam performed     In-Clinic Test Results:  Results for orders placed or performed in visit on 07/11/18 (from the past 24 hour(s))   CBC with platelets   Result Value Ref Range    WBC 4.3 4.0 - 11.0 10e9/L    RBC Count 4.79 3.8 - 5.2 10e12/L    Hemoglobin 11.7 11.7 - 15.7 g/dL    Hematocrit 35.3 35.0 - 47.0 %    MCV 74 (L) 78 - 100 fl    MCH 24.4 (L) 26.5 - 33.0 pg    MCHC 33.1 31.5 - 36.5 g/dL    RDW 16.9 (H) 10.0 - 15.0 %    Platelet Count 332 150 - 450 10e9/L     US result 7/5/18:  CLINICAL INFORMATION      Indications for ultrasound:   Bleeding/Menses - Metrorrhagia (irregular menses)      LMP: 27 May 18    Hormones: none- essure      Measurements:  Uterus:   12.1 x 5.7 x 6.7 cm.     Position is anteverted.  Contour is smth/reg.      Endo cav: 13 mm         Smooth/regular/wnl  Cervix: wnl      Right ovary:  3.5 x 1.6 x 2.5  cm.   Wnl  Left ovary:    4.3 x 3.1 x 3.8  cm.  Simple cyst 3.3 x 2.7 x 3.3 cm      Cul de sac: no free fluid      *Other findings:       ===================================  Complete pelvic ultrasound using realtime   transabdominal and transvaginal scanning  Bladder appears normal         Normal uterus     Simple left ovarian cyst, benign appearing, recommend repeat ultrasound as in 6-8 weeks to ensure resolution.      Dr. Sheree Soriano, DO    Obstetrics and Gynecology  [facility]     ASSESSMENT/PLAN:                                                        ICD-10-CM    1. Other iron deficiency anemia D50.8 CBC with platelets   2. Fatigue, unspecified type R53.83 CBC with platelets   3. Menorrhagia with regular cycle N92.0    4. Metrorrhagia N92.1        COUNSELING:    -CBC today  -Counseled to " seek emergent treatment for any heavy bleeding, soaking >1 pad per hour without any decrease and/or any signs and symptoms of blood loss  -continue iron as previously prescribed  -Referral to Dr. Mosqueda at [facility] for possible Essure removal.  return to clinic sooner if needed    Katharina MAR, EDINSON

## 2018-07-11 NOTE — MR AVS SNAPSHOT
After Visit Summary   7/11/2018    Melissa Painter    MRN: [id number]           Patient Information     Date Of Birth          1975        Visit Information        Provider Department      7/11/2018 2:45 PM [facility]        Today's Diagnoses     Other iron deficiency anemia    -  1    Fatigue, unspecified type        Menorrhagia with regular cycle        Metrorrhagia          Care Instructions    Iron Rich Foods  Iron is an important mineral for good health. Without enough iron you are more prone to getting sick and feeling tired.  Babies/children need iron for healthy brain development.    Recommended amount of Iron for Women  Ages 14-18  15 mg  Ages 19-49  18 mg   Over 50  8 mg  Pregnancy  27 mg  Breastfeeding  9 mg  Vegetarians   33 mg    There is extra iron in multivitamins and prenatal vitamins. Sometimes women can have a lower hemoglobin (part of your blood that carries oxygen) after menses and when pregnant. Adding some of these iron rich foods to your diet can help you feel better, bring your iron levels up without supplementing with iron pills or liquids. If you are already supplementing with iron these food will only help!    Iron Rich Foods:    Meat (2.5 oz servings range anywhere from 0.6-21 mg of Iron)  Clams      Liver (chicken/pork)     Oysters    Mussels       Beef liver    Beef       Shrimp     Sardines     Tuna/herring/mackerel   Chicken  Pork     Turkey/Lamb  [place]     Flounder/sole  Vegtables  Dark green leafy vegetables like kale/mixed greens /swiss chard  Broccoli      Asparagus  Green peas      Beets  Potato (with skin)  Beans   Chickpeas      Camilo Beans  Soy beans      Kidney beans   Lentils       Refried beans   Grains  Whole wheat bread     Bagels  Pasta (enriched)     Quinoa  Cereal       Shredded wheat  Cream of wheat  (12 mg)    Oatmeal  Pearled barley      Wheat germ (toasted)  Other  pumpkin  seeds     Tofu  Raisins       Peanut butter  Tahini        Eggs  Sour Cherries      Prune juice                Follow-ups after your visit        Follow-up notes from your care team     Return in about 2 weeks (around 7/25/2018), or if symptoms worsen or fail to improve, for Office visit.      Your next 10 appointments already scheduled     Jul 16, 2018 11:55 AM CDT   (Arrive by 11:40 AM)   [facility] For Women Only with Cecilia Rodriguez, PT   [place] ([facility]  )    [address]  [place] [address]   [phone]            Jul 23, 2018 10:45 AM CDT   [facility] For Women Only with Cecilia SamaniegoRohan, [facility] ([facility]  )    [address]  [place] [address]   [phone]            Jul 30, 2018 10:45 AM CDT   [facility] For Women Only with Cecilia Rodriguez, [facility] ([facility]  )    [address]  [place] [address]   [phone]              Who to contact     If you have questions or need follow up information about today's clinic visit or your schedule please contact [facility] directly at [phone].  Normal or non-critical lab and imaging results will be communicated to you by Soevolvedhart, letter or phone within 4 business days after the clinic has received the results. If you do not hear from us within 7 days, please contact the clinic through Soevolvedhart or phone. If you have a critical or abnormal lab result, we will notify you by phone as soon as possible.  Submit refill requests through [employer] or call your pharmacy and they will forward the refill request to us. Please allow 3 business days for your refill to be completed.          Additional Information About Your Visit        [email or web address]     [employer] gives you secure access to your electronic health record. If you  "see a primary care provider, you can also send messages to your care team and make appointments. If you have questions, please call your primary care clinic.  If you do not have a primary care provider, please call [phone] and they will assist you.        Care EveryWhere ID     This is your Care EveryWhere ID. This could be used by other organizations to access your [place] medical records  [id number]        Your Vitals Were     Height Last Period BMI (Body Mass Index)             5' 6\" (1.676 m) (LMP Unknown) 34.22 kg/m2          Blood Pressure from Last 3 Encounters:   07/11/18 110/60   06/25/18 96/78   05/31/18 111/77    Weight from Last 3 Encounters:   07/11/18 212 lb (96.2 kg)   06/25/18 220 lb (99.8 kg)   04/27/18 202 lb (91.6 kg)              We Performed the Following     CBC with platelets        Primary Care Provider Fax #    Physician No Ref-Primary [phone]       No address on file        Equal Access to Services     BERT THIBODEAUX : Hadii aad ku hadasho Soomaali, waaxda luqadaha, qaybta kaalmada adeegyada, waxay gladisin jorge munoz . So [facility] [phone].    ATENCIÓN: Si habla español, tiene a edwards disposición servicios gratuitos de asistencia lingüística. [facility] [phone].    We comply with applicable federal civil rights laws and [place] laws. We do not discriminate on the basis of race, color, national origin, age, disability, sex, sexual orientation, or gender identity.            Thank you!     Thank you for choosing [facility]  for your care. Our goal is always to provide you with excellent care. Hearing back from our patients is one way we can continue to improve our services. Please take a few minutes to complete the written survey that you may receive in the mail after your visit with us. Thank you!             Your Updated Medication List - Protect others around you: Learn how to safely use, store and throw away your medicines at " www.disposemymeds.org.          This list is accurate as of 7/11/18  4:06 PM.  Always use your most recent med list.                   Brand Name Dispense Instructions for use Diagnosis    CALCIUM 1000 + D 1000-800 MG-UNIT Tabs   Generic drug:  Calcium Carb-Cholecalciferol     100 tablet    TAKE ONE TABLET BY MOUTH EVERY DAY WITH FOOD FOR BONE HEALTH AND VITAMIN D SUPPLEMENTATION    Chronic fatigue, Vitamin D deficiency       cetirizine 10 MG tablet    zyrTEC    90 tablet    TAKE ONE TABLET BY MOUTH EVERY DAY TO TREAT NASAL ALLERGIES    Seasonal allergic rhinitis       cholecalciferol 5000 units Caps     90 capsule    Take 1 capsule (5,000 Units) by mouth daily INDICATION: LOW VITAMIN D, TAKE WITH FOOD    Chronic fatigue       * clobetasol 0.05 % external solution    TEMOVATE    50 mL    Apply topically At Bedtime and rub into affected areas of scalp. Never to be used on face nor groin.    Lichen planopilaris       * clobetasol 0.05 % cream    TEMOVATE    60 g    Apply a thin layer to aa on hands for 1-2 weeks. Tapering with improvement and using during flares. Do not use on face or body folds    Contact dermatitis, unspecified contact dermatitis type, unspecified trigger       ferrous fumarate 65 mg (Quinault. FE)-Vitamin C 125 mg  MG Tabs tablet    VITRON C    100 tablet    Take 1 tablet by mouth every morning (before breakfast) INDICATION: IRON SUPPLEMENT    Chronic fatigue, Iron deficiency anemia, unspecified iron deficiency anemia type       fluticasone 50 MCG/ACT spray    FLONASE    16 g    Spray 2 sprays in nostril At Bedtime INDICATION: TO CONTROL NASAL ALLERGY SYMPTOMS    Seasonal allergic rhinitis       IBUPROFEN PO           PRENATAL LOW IRON 27-1 MG Tabs     90 tablet    Take 1 tablet by mouth daily INDICATION: VITAMIN SUPPLEMENTATION    Iron deficiency anemia, unspecified iron deficiency anemia type, Menorrhagia with regular cycle       * Notice:  This list has 2 medication(s) that are the same as  other medications prescribed for you. Read the directions carefully, and ask your doctor or other care provider to review them with you.

## 2018-07-16 ENCOUNTER — THERAPY VISIT (OUTPATIENT)
Dept: PHYSICAL THERAPY | Facility: CLINIC | Age: 43
End: 2018-07-16
Payer: OTHER GOVERNMENT

## 2018-07-16 DIAGNOSIS — N81.11 CYSTOCELE, MIDLINE: Primary | ICD-10-CM

## 2018-07-16 DIAGNOSIS — N94.10 DYSPAREUNIA, FEMALE: ICD-10-CM

## 2018-07-16 DIAGNOSIS — M62.89 HIGH-TONE PELVIC FLOOR DYSFUNCTION: ICD-10-CM

## 2018-07-16 PROCEDURE — 97112 NEUROMUSCULAR REEDUCATION: CPT | Mod: GP | Performed by: PHYSICAL THERAPIST

## 2018-07-16 PROCEDURE — 97140 MANUAL THERAPY 1/> REGIONS: CPT | Mod: GP | Performed by: PHYSICAL THERAPIST

## 2018-07-16 PROCEDURE — 97530 THERAPEUTIC ACTIVITIES: CPT | Mod: GP | Performed by: PHYSICAL THERAPIST

## 2018-07-16 PROCEDURE — 97161 PT EVAL LOW COMPLEX 20 MIN: CPT | Mod: GP | Performed by: PHYSICAL THERAPIST

## 2018-07-16 NOTE — PROGRESS NOTES
[facility] Initial Evaluation  Subjective:  The history is provided by the patient.            Patient is a 43 year old  female who presents to [facility] with dyspareunia and a cystocele.  Patient states that she believes her problems began following a vacuum extraction during a L&D 16 years ago.  Patient states currently she is bothered by her cystocele because she notices a feeling of heaviness and something falling out from inside.  Patient rates her pain and discomfort 3/10 and describes it as heavy.  Patient states her overall health rate is fair.  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  PMH; overweight, implanted device(IUD), anemia and pain at night/rest.  Patient has noted bleeding over the last 2 months because of her IUD.      History of current episodes;  .    CC/Present symptoms:  See Above   Pain rating (0-10): 3/10  Conditioning is improving/unchanging/worsening: worsening   Pelvic/Abdominal Surgeries: No  Hx of or present sexually transmitted disease: NA  Current occupation:  CNA  Current activity: other than work inactive  Goals: Decrease pelvic floor heaviness  Red flags: No      Urination:  Do you leak on the way to the bathroom or with a strong urge to void? No   Do you leak with cough,sneeze, jumping, running?No   Any other activities that cause leaking? No   Do you have triggers that make you feel you can't wait to go to the bathroom? No   Type of pad and number used per day? None  When you leak what is the amount? none  How long can you delay the need to urinate? 11 - 30 minutes.   Do you feel excessive pressure in pelvic floor: Yes   When? Most of the time  Frequency of daytime urination: Unaware  Frequency of nighttime urination: 0-2  Can you stop the flow of urine when on the toilet? Yes  Is the volume of urine passed usually: average. (8sec rule= 250ml with average bladder storing 400-600ml)  Do you  strain to pass urine? No  Do you have a slow or hesitant urinary stream? Yes  Do you have difficulty initiating the urine stream? Yes  Is urination painful? No  How many bladder infections have you had in last 12 months? 0  Fluid intake(one glass is 8oz or one cup)  1/2 gallon glasses/day, 2 caffinated glasses/day 0  alcohol glasses/day.  Bowel habits:  Frequency of bowel movements? 1  times a day  Consistancy of stool? soft formed, hard,   Do you ignore the urge to defecate? No  Do you strain to pass stool? Yes  Pelvic Pain:  Do you have any pelvic pain with intercourse, exams, use of tampons? Yes  Is initial penetration during intercourse painful? No  Is deeper penetration painful? Yes  Do you use lubricant? No   Are you sexually active?Yes  Have you ever been worried for your physical safety? No  Have you practiced the PF(kegel) exercises for 4 or more weeks?yes  Marinoff Scale:Level 1   (Level 3: Abstinence from intercourse because of severe pain. Level 2: Painful intercourse which limites frequency of activity. Level 1: Painful intercourse not severe enough to prevent activity.)    Treatment/Education provided this session (see flow sheet for additional information):    Self Care Management/Patient education (12 min): Today's session consisted of education regarding pelvic floor muscle anatomy, normal bladder function, urge suppression techniques and/or relaxation techniques as indicated, and instruction in how to complete a bladder diary for assessment next visit. Depending on patient presentation, timed voids, double voids, and proper fluid/fiber intake discussed. Pt was instructed in the pathoanatomy of the pelvic floor utilizing pelvic model.  We discussed what pelvic floor physical therapy is, components of exam, and typical patient progression. Prior to internal PFM exam,  patient was told that they were in control of exam progression, and if at any time were uncomfortable and wished to discontinue we  "would.        NMR (12 min): Patient instruction in correct isolation of PFM/TrA then coordinated contraction of \"canister\" muscles: diaphragm, Transverse Abdominals, PFM, and posterior spine musculature.  Educated in initiating contraction from anal sphinctor, elevating through PFM, contraction of TrA, while exhaling slowly.  Cued for maximal and sub-maximal contractions, using hip rotators and adductors for overflow if needed.  Pt required cuing for each progression, with decreasing feedback as tolerated.  Instructed to avoid Valsalva/increased inter-abdominal pressure.  Pt cued through verbal, tactile (internal and external), and visual cuing utilizing biofeedback.  For HEP, pt encouraged to separate each phase of the exercise, then work towards coordination of the phases together with good breath technique with goal of developing good neuromuscular control of area.        NMR pelvic pain (12 min):  Pt education regarding contributing factors to pelvic pain and dysfunction related to overactivity in the pelvic floor.  Included resources for relaxed awareness and pelvic floor quieting techniqes.  Extra time spent describing pelvic floor muscle exam and treatment plan/goals, with attention to potential history that may contribute to current symptoms.  Included resources for home release techniques using dilators and/or thera wand as indicated.  Recommended partner involvement if available.  Discussed in detail potential physiological and behavioral components of pelvic pain.  Demonstrated/performed techniques for input to painful area while using visualization and relaxation.                           Objective:  System                                 Pelvic Dysfunction Evaluation:    Bladder/Pelvic Problems:  Patient presents with cystocele and dyspareunia.  Storage Problem:  Nocturia  Emptying Problem:  Incomplete emptying, dyspareunia, strain to void, hesitancy and postvoid dribbling  Dyspareunia:  Grade 1  "     Flexibility:    Tightness present at:Adductors; Iliopsoas; Hamstrings; Piriformis and Gluteals  obturator internus  Abdominal Wall:  Abdominal wall pelvic: Moderate fascial restrictions about and around large and small intestines.    Trigger Points:  Iliopsoas    Pelvic Clock Exam:          Levator ANI:  +    SI Provocation:  NA        Reflex Testing:  NA    External Assessment:    Skin Condition:  Normal    Bearing Down/Coughing:  Cystocele      Muscle Contraction/Perineal Mobility:  Slight lift, no urogential triangle descent  Internal Assessment:  Internal assessment pelvic: Internal vaginal assessment of pelvic floor musculature reveals severe hypertonicity bilateral right >left.  Sensory Exam:  Normal  Contraction/Grade:  Fair squeeze, definite lift (3)          SEMG Biofeedback:    Equipment:  Surface EMG    Suraface electrode placement--Perianal:  Mireya anal  Baseline EMG PM:  Resting baseline of PF 4.3uV      Sustained contraction:  Average 10 sec hold 14.1uV  EMG interpretation to fatigue:  8-10 seconds  Position:  SupineAdditional History:  Delivery History:  Episiotomies and vaginal delivery  Number of Pregnancies: 6  Number of Live Births: 5  Caffeine Consumption:  2                     General     ROS    Assessment/Plan:    Patient is a 43 year old female with lumbar, sacral and pelvic complaints.    Patient has the following significant findings with corresponding treatment plan.                Diagnosis 1:  cystocele  Diagnosis 2:  dyspareunia    Therapy Evaluation Codes:   1) History comprised of:   Personal factors that impact the plan of care:      None.    Comorbidity factors that impact the plan of care are:      Bowel/bladder changes, Implanted device and Pain at night/rest.     Medications impacting care: None.  2) Examination of Body Systems comprised of:   Body structures and functions that impact the plan of care:      Lumbar spine, Pelvis and Sacral illiac joint.   Activity limitations  that impact the plan of care are:      Jumping, Lifting, Running, Sitting, Standing, Walking, Working, Sleeping, Pelvic Exam and [place].  3) Clinical presentation characteristics are:   Stable/Uncomplicated.  4) Decision-Making    Low complexity using standardized patient assessment instrument and/or measureable assessment of functional outcome.  Cumulative Therapy Evaluation is: Low complexity.    Previous and current functional limitations:  (See Goal Flow Sheet for this information)    Short term and Long term goals: (See Goal Flow Sheet for this information)     Communication ability:  Patient appears to be able to clearly communicate and understand verbal and written communication and follow directions correctly.  Treatment Explanation - The following has been discussed with the patient:   RX ordered/plan of care  Anticipated outcomes  Possible risks and side effects  This patient would benefit from PT intervention to resume normal activities.   Rehab potential is good.    Frequency:  1 X week, once daily  Duration:  for 10 weeks  Discharge Plan:  Achieve all LTG.  Independent in home treatment program.  Reach maximal therapeutic benefit.    Please refer to the daily flowsheet for treatment today, total treatment time and time spent performing 1:1 timed codes.

## 2018-07-16 NOTE — MR AVS SNAPSHOT
After Visit Summary   7/16/2018    Melissa Painter    MRN: [id number]           Patient Information     Date Of Birth          1975        Visit Information        Provider Department      7/16/2018 11:55 AM Cecilia Rodriguez, PT [facility]        Today's Diagnoses     Cystocele, midline    -  1    Dyspareunia, female        High-tone pelvic floor dysfunction           Follow-ups after your visit        Your next 10 appointments already scheduled     Jul 23, 2018 10:45 AM CDT   [facility] For Women Only with Cecilia Rodriguez PT   [facility] ([facility]  )    [address] [place] [address] [address]  [facility] [id number] [phone]            Jul 23, 2018  3:15 PM CDT   Office Visit with Patrick Mosqueda MD   [facility] ([facility])    [address] [place] [address]  [address]  [facility] [id number] [phone]           Bring a current list of meds and any records pertaining to this visit. For Physicals, please bring immunization records and any forms needing to be filled out. Please arrive 10 minutes early to complete paperwork.            Jul 30, 2018 10:45 AM CDT   [facility] For Women Only with Cecilia Rodriguez PT   [place] [facility] ([facility]  )    [address] [place] [address] [address]  [facility] [id number] [phone]              Who to contact     If you have questions or need follow up information about today's clinic visit or your schedule please contact [facility] PH[facility]ICAL THERAP[facility] directly at [phone].  Normal or non-critical lab and imaging results will be communicated to you by MyChart, letter or phone within 4 business days after the clinic has received the results. If you do not hear from us within 7 days, please contact the clinic through MyChart or phone. If you have a critical or  abnormal lab result, we will notify you by phone as soon as possible.  Submit refill requests through Stitch or call your pharmacy and they will forward the refill request to us. Please allow 3 business days for your refill to be completed.          Additional Information About Your Visit        Vignohart Information     Stitch gives you secure access to your electronic health record. If you see a primary care provider, you can also send messages to your care team and make appointments. If you have questions, please call your primary care clinic.  If you do not have a primary care provider, please call [phone] and they will assist you.        Care EveryWhere ID     This is your Care EveryWhere ID. This could be used by other organizations to access your [facility] medical records  [id number]        Your Vitals Were     Last Period                   (LMP Unknown)            Blood Pressure from Last 3 Encounters:   07/11/18 110/60   06/25/18 96/78   05/31/18 111/77    Weight from Last 3 Encounters:   07/11/18 96.2 kg (212 lb)   06/25/18 99.8 kg (220 lb)   04/27/18 91.6 kg (202 lb)              We Performed the Following     HC PT EVAL, LOW COMPLEXITY     MANUAL THER TECH,1+REGIONS,EA 15 MIN     NEUROMUSCULAR RE-EDUCATION     THERAPEUTIC ACTIVITIES        Primary Care Provider Fax #    Physician No Ref-Primary [phone]       No address on file        Equal Access to Services     PENNY THIBODEAUX : Hadii raleigh jean-baptiste hadasho Soomaali, waaxda luqadaha, qaybta kaalmada adeegyada, prakash munoz . So [facility] [phone].    ATENCIÓN: Si habla español, tiene a edwards disposición servicios gratuitos de asistencia lingüística. Llame al [phone].    We comply with applicable federal civil rights laws and [place] laws. We do not discriminate on the basis of race, color, national origin, age, disability, sex, sexual orientation, or gender identity.            Thank you!     Thank you for choosing  [facility]ER[facility]  for your care. Our goal is always to provide you with excellent care. Hearing back from our patients is one way we can continue to improve our services. Please take a few minutes to complete the written survey that you may receive in the mail after your visit with us. Thank you!             Your Updated Medication List - Protect others around you: Learn how to safely use, store and throw away your medicines at www.disposemymeds.org.          This list is accurate as of 7/16/18  1:43 PM.  Always use your most recent med list.                   Brand Name Dispense Instructions for use Diagnosis    CALCIUM 1000 + D 1000-800 MG-UNIT Tabs   Generic drug:  Calcium Carb-Cholecalciferol     100 tablet    TAKE ONE TABLET BY MOUTH EVERY DAY WITH FOOD FOR BONE HEALTH AND VITAMIN D SUPPLEMENTATION    Chronic fatigue, Vitamin D deficiency       cetirizine 10 MG tablet    zyrTEC    90 tablet    TAKE ONE TABLET BY MOUTH EVERY DAY TO TREAT NASAL ALLERGIES    Seasonal allergic rhinitis       cholecalciferol 5000 units Caps     90 capsule    Take 1 capsule (5,000 Units) by mouth daily INDICATION: LOW VITAMIN D, TAKE WITH FOOD    Chronic fatigue       * clobetasol 0.05 % external solution    TEMOVATE    50 mL    Apply topically At Bedtime and rub into affected areas of scalp. Never to be used on face nor groin.    Lichen planopilaris       * clobetasol 0.05 % cream    TEMOVATE    60 g    Apply a thin layer to aa on hands for 1-2 weeks. Tapering with improvement and using during flares. Do not use on face or body folds    Contact dermatitis, unspecified contact dermatitis type, unspecified trigger       ferrous fumarate 65 mg ([place]. FE)-Vitamin C 125 mg  MG Tabs tablet    VITRON C    100 tablet    Take 1 tablet by mouth every morning (before breakfast) INDICATION: IRON SUPPLEMENT    Chronic fatigue, Iron deficiency anemia, unspecified iron deficiency anemia type        fluticasone 50 MCG/ACT spray    FLONASE    16 g    Spray 2 sprays in nostril At Bedtime INDICATION: TO CONTROL NASAL ALLERGY SYMPTOMS    Seasonal allergic rhinitis       IBUPROFEN PO           PRENATAL LOW IRON 27-1 MG Tabs     90 tablet    Take 1 tablet by mouth daily INDICATION: VITAMIN SUPPLEMENTATION    Iron deficiency anemia, unspecified iron deficiency anemia type, Menorrhagia with regular cycle       * Notice:  This list has 2 medication(s) that are the same as other medications prescribed for you. Read the directions carefully, and ask your doctor or other care provider to review them with you.

## 2018-07-23 ENCOUNTER — OFFICE VISIT (OUTPATIENT)
Dept: OBGYN | Facility: CLINIC | Age: 43
End: 2018-07-23
Payer: OTHER GOVERNMENT

## 2018-07-23 VITALS
HEART RATE: 60 BPM | HEIGHT: 66 IN | BODY MASS INDEX: 34.39 KG/M2 | SYSTOLIC BLOOD PRESSURE: 116 MMHG | DIASTOLIC BLOOD PRESSURE: 70 MMHG | WEIGHT: 214 LBS

## 2018-07-23 DIAGNOSIS — R10.2 FEMALE PELVIC PAIN: Primary | ICD-10-CM

## 2018-07-23 PROCEDURE — 99214 OFFICE O/P EST MOD 30 MIN: CPT | Performed by: OBSTETRICS & GYNECOLOGY

## 2018-07-23 ASSESSMENT — ANXIETY QUESTIONNAIRES
GAD7 TOTAL SCORE: 0
1. FEELING NERVOUS, ANXIOUS, OR ON EDGE: NOT AT ALL
3. WORRYING TOO MUCH ABOUT DIFFERENT THINGS: NOT AT ALL
6. BECOMING EASILY ANNOYED OR IRRITABLE: NOT AT ALL
IF YOU CHECKED OFF ANY PROBLEMS ON THIS QUESTIONNAIRE, HOW DIFFICULT HAVE THESE PROBLEMS MADE IT FOR YOU TO DO YOUR WORK, TAKE CARE OF THINGS AT HOME, OR GET ALONG WITH OTHER PEOPLE: NOT DIFFICULT AT ALL
2. NOT BEING ABLE TO STOP OR CONTROL WORRYING: NOT AT ALL
5. BEING SO RESTLESS THAT IT IS HARD TO SIT STILL: NOT AT ALL
7. FEELING AFRAID AS IF SOMETHING AWFUL MIGHT HAPPEN: NOT AT ALL

## 2018-07-23 ASSESSMENT — PATIENT HEALTH QUESTIONNAIRE - PHQ9: 5. POOR APPETITE OR OVEREATING: NOT AT ALL

## 2018-07-23 NOTE — PROGRESS NOTES
SUBJECTIVE:                                                   Melissa Painter is a 43 year old female who presents to clinic today for the following health issue(s):  Patient presents with:  Vaginal Problem: Pelvic pain and bleeding for past 2 months. Been feeling tired and having swellng in the legs. Would like to discuss essure removal. Essure in 2013        HPI:  Patient is a 43-year-old  5 para 4 who in  had an Essure procedure.  Following the procedure she started to notice hair loss and weight gain.  She is also noted a change in her menstrual cycle which has become irregular.  More recently she has had bleeding that has been off and on for 2 weeks.  She had a recent pelvic ultrasound which was normal.  The patient also complains of pelvic pain right-sided greater than left.  She is also noticed fatigue, headaches and joint pain along with occasional swollen ankles.  She would like to have the Essure devices removed.    Patient's last menstrual period was 2018 (exact date)..   Patient is sexually active, .  Using Essure for contraception.    reports that she has never smoked. She has never used smokeless tobacco.    STD testing offered?  Declined    Health maintenance updated:  yes    Today's PHQ-2 Score:   PHQ-2 (  [employer]) 2017   Q1: Little interest or pleasure in doing things 0   Q2: Feeling down, depressed or hopeless 0   PHQ-2 Score 0     Today's PHQ-9 Score:   PHQ-9 SCORE 2018   Total Score 0     Today's REGINALDO-7 Score:   REGINALDO-7 SCORE 2018   Total Score 0       Problem list and histories reviewed & adjusted, as indicated.  Additional history: as documented.    Patient Active Problem List   Diagnosis     Iron deficiency anemia, unspecified iron deficiency     Chronic fatigue     Vitamin D deficiency     Hair loss     Immunity status testing     Excessive or frequent menstruation     CARDIOVASCULAR SCREENING; LDL GOAL LESS THAN 160     Seasonal allergic rhinitis      Rectocele     Cystocele, midline     Urgency-frequency syndrome     Cervicalgia     Acute bilateral low back pain without sciatica     Hepatitis B carrier (H)     Acute right-sided low back pain without sciatica     Dyspareunia, female     High-tone pelvic floor dysfunction     Past Surgical History:   Procedure Laterality Date     AS HYSTEROS W PERMANENT FALLOPAIN IMPLANT  2013    ESSURE     wisdom teeth        Social History   Substance Use Topics     Smoking status: Never Smoker     Smokeless tobacco: Never Used     Alcohol use 0.0 oz/week     0 Standard drinks or equivalent per week      Comment: 2-3 glasses wine/yr      Problem (# of Occurrences) Relation (Name,Age of Onset)    Hypertension (1) Mother            Current Outpatient Prescriptions   Medication Sig     CALCIUM 1000 + D 1000-800 MG-UNIT TABS TAKE ONE TABLET BY MOUTH EVERY DAY WITH FOOD FOR BONE HEALTH AND VITAMIN D SUPPLEMENTATION     cetirizine (ZYRTEC) 10 MG tablet TAKE ONE TABLET BY MOUTH EVERY DAY TO TREAT NASAL ALLERGIES     cholecalciferol 5000 UNITS CAPS Take 1 capsule (5,000 Units) by mouth daily INDICATION: LOW VITAMIN D, TAKE WITH FOOD     clobetasol (TEMOVATE) 0.05 % cream Apply a thin layer to aa on hands for 1-2 weeks. Tapering with improvement and using during flares. Do not use on face or body folds     clobetasol (TEMOVATE) 0.05 % external solution Apply topically At Bedtime and rub into affected areas of scalp. Never to be used on face nor groin.     ferrous fumarate 65 mg, [place]. FE,-Vitamin C 125 mg (VITRON C)  MG TABS tablet Take 1 tablet by mouth every morning (before breakfast) INDICATION: IRON SUPPLEMENT     fluticasone (FLONASE) 50 MCG/ACT nasal spray Spray 2 sprays in nostril At Bedtime INDICATION: TO CONTROL NASAL ALLERGY SYMPTOMS     IBUPROFEN PO      Prenatal Vit-Fe Fumarate-FA (PRENATAL LOW IRON) 27-1 MG TABS Take 1 tablet by mouth daily INDICATION: VITAMIN SUPPLEMENTATION     No current facility-administered  "medications for this visit.      Allergies   Allergen Reactions     Levaquin [Levofloxacin] Itching     Influenza Vaccine Live        ROS:  12 point review of systems negative other than symptoms noted below.  Constitutional: Fatigue and Weight Gain  Genitourinary: Pelvic Pain and Spotting  Neurologic: Headaches  Musculoskeletal: Joint Pain and Muscle Cramps  Endocrine: Loss of Hair  Psychiatric: Ponce    OBJECTIVE:     /70  Pulse 60  Ht 5' 6\" (1.676 m)  Wt 214 lb (97.1 kg)  LMP 05/20/2018 (Exact Date)  Breastfeeding? No  BMI 34.54 kg/m2  Body mass index is 34.54 kg/(m^2).    Exam:  Constitutional:  Appearance: Well nourished, well developed alert, in no acute distress  Neck:  Lymph Nodes:  No lymphadenopathy present; Thyroid:  Gland size normal, nontender, no nodules or masses present on palpation  Chest:  Respiratory Effort:  Breathing unlabored  Cardiovascular: Heart: Auscultation:  Regular rate, normal rhythm, no murmurs present  Gastrointestinal:  Abdominal Examination:  Abdomen nontender to palpation, tone normal without rigidity or guarding, no masses present, umbilicus without lesions; Liver/Spleen:  No hepatomegaly present, liver nontender to palpation; Hernias:  No hernias present  Lymphatic: Lymph Nodes:  No other lymphadenopathy present  Skin:General Inspection:  No rashes present, no lesions present, no areas of discoloration; Genitalia and Groin:  No rashes present, no lesions present, no areas of discoloration, no masses present.  Neurologic/Psychiatric:  Mental Status:  Oriented X3   No Pelvic Exam performed     In-Clinic Test Results:  No results found for this or any previous visit (from the past 24 hour(s)).    ASSESSMENT/PLAN:                                                        ICD-10-CM    1. Female pelvic pain R10.2 [facility]ya-Operative Worksheet GYN           Plan: Patient will be scheduled for laparoscopic bilateral removal of Essure devices with bilateral salpingectomy.  I described " the operative procedure to the patient along with potential risks and complications.  The risks include reaction anesthesia, infection, bleeding and injury to other organs.  Patient indicated that she understood and accepted these risks.  Should like to schedule him proceed with the surgery.    Patrick Mosqueda MD  [facility]

## 2018-07-23 NOTE — MR AVS SNAPSHOT
After Visit Summary   7/23/2018    Melissa Painter    MRN: [id number]           Patient Information     Date Of Birth          1975        Visit Information        Provider Department      7/23/2018 3:15 PM Patrick Mosqueda MD [facility]        Today's Diagnoses     Female pelvic pain    -  1       Follow-ups after your visit        Your next 10 appointments already scheduled     Jul 30, 2018 10:45 AM CDT   [facility] For Women Only with Cecilia Rodriguez, BEVERLY   [facility] ([facility]  )    [address] [place] [address] [address]  [place] [address] [phone]              Who to contact     If you have questions or need follow up information about today's clinic visit or your schedule please contact [facility] directly at [phone].  Normal or non-critical lab and imaging results will be communicated to you by MyChart, letter or phone within 4 business days after the clinic has received the results. If you do not hear from us within 7 days, please contact the clinic through DataPadhart or phone. If you have a critical or abnormal lab result, we will notify you by phone as soon as possible.  Submit refill requests through Zample or call your pharmacy and they will forward the refill request to us. Please allow 3 business days for your refill to be completed.          Additional Information About Your Visit        MyChart Information     Zample gives you secure access to your electronic health record. If you see a primary care provider, you can also send messages to your care team and make appointments. If you have questions, please call your primary care clinic.  If you do not have a primary care provider, please call [phone] and they will assist you.        Care EveryWhere ID     This is your Care EveryWhere ID. This could be used by other organizations to access your [facility]  "records  [id number]        Your Vitals Were     Pulse Height Last Period Breastfeeding? BMI (Body Mass Index)       60 5' 6\" (1.676 m) 05/20/2018 (Exact Date) No 34.54 kg/m2        Blood Pressure from Last 3 Encounters:   07/23/18 116/70   07/11/18 110/60   06/25/18 96/78    Weight from Last 3 Encounters:   07/23/18 214 lb (97.1 kg)   07/11/18 212 lb (96.2 kg)   06/25/18 220 lb (99.8 kg)              We Performed the Following     [facility]ya-Operative Worksheet GYN        Primary Care Provider Fax #    Physician No Ref-Primary [phone]       No address on file        Equal Access to Services     PENNY THIBODEAUX : Dat Viera, waricardo coleman, gayathri kaalmafilomena torre, prakash munoz . So [facility] [phone].    ATENCIÓN: Si habla español, tiene a edwards disposición servicios gratuitos de asistencia lingüística. Llame al [phone].    We comply with applicable federal civil rights laws and [place] laws. We do not discriminate on the basis of race, color, national origin, age, disability, sex, sexual orientation, or gender identity.            Thank you!     Thank you for choosing [facility]  for your care. Our goal is always to provide you with excellent care. Hearing back from our patients is one way we can continue to improve our services. Please take a few minutes to complete the written survey that you may receive in the mail after your visit with us. Thank you!             Your Updated Medication List - Protect others around you: Learn how to safely use, store and throw away your medicines at www.disposemymeds.org.          This list is accurate as of 7/23/18  4:01 PM.  Always use your most recent med list.                   Brand Name Dispense Instructions for use Diagnosis    CALCIUM 1000 + D 1000-800 MG-UNIT Tabs   Generic drug:  Calcium Carb-Cholecalciferol     100 tablet    TAKE ONE TABLET BY MOUTH EVERY DAY WITH FOOD FOR BONE HEALTH AND VITAMIN " D SUPPLEMENTATION    Chronic fatigue, Vitamin D deficiency       cetirizine 10 MG tablet    zyrTEC    90 tablet    TAKE ONE TABLET BY MOUTH EVERY DAY TO TREAT NASAL ALLERGIES    Seasonal allergic rhinitis       cholecalciferol 5000 units Caps     90 capsule    Take 1 capsule (5,000 Units) by mouth daily INDICATION: LOW VITAMIN D, TAKE WITH FOOD    Chronic fatigue       * clobetasol 0.05 % external solution    TEMOVATE    50 mL    Apply topically At Bedtime and rub into affected areas of scalp. Never to be used on face nor groin.    Lichen planopilaris       * clobetasol 0.05 % cream    TEMOVATE    60 g    Apply a thin layer to aa on hands for 1-2 weeks. Tapering with improvement and using during flares. Do not use on face or body folds    Contact dermatitis, unspecified contact dermatitis type, unspecified trigger       ferrous fumarate 65 mg ([place]. FE)-Vitamin C 125 mg  MG Tabs tablet    VITRON C    100 tablet    Take 1 tablet by mouth every morning (before breakfast) INDICATION: IRON SUPPLEMENT    Chronic fatigue, Iron deficiency anemia, unspecified iron deficiency anemia type       fluticasone 50 MCG/ACT spray    FLONASE    16 g    Spray 2 sprays in nostril At Bedtime INDICATION: TO CONTROL NASAL ALLERGY SYMPTOMS    Seasonal allergic rhinitis       IBUPROFEN PO           PRENATAL LOW IRON 27-1 MG Tabs     90 tablet    Take 1 tablet by mouth daily INDICATION: VITAMIN SUPPLEMENTATION    Iron deficiency anemia, unspecified iron deficiency anemia type, Menorrhagia with regular cycle       * Notice:  This list has 2 medication(s) that are the same as other medications prescribed for you. Read the directions carefully, and ask your doctor or other care provider to review them with you.

## 2018-07-24 ENCOUNTER — TELEPHONE (OUTPATIENT)
Dept: OBGYN | Facility: CLINIC | Age: 43
End: 2018-07-24

## 2018-07-24 ASSESSMENT — ANXIETY QUESTIONNAIRES: GAD7 TOTAL SCORE: 0

## 2018-07-24 ASSESSMENT — PATIENT HEALTH QUESTIONNAIRE - PHQ9: SUM OF ALL RESPONSES TO PHQ QUESTIONS 1-9: 0

## 2018-08-23 ENCOUNTER — TELEPHONE (OUTPATIENT)
Dept: OBGYN | Facility: CLINIC | Age: 43
End: 2018-08-23

## 2018-08-23 NOTE — TELEPHONE ENCOUNTER
Surgery scheduled for 8/31/18 with KAREL for bilateral salpingectomy  Asking what to expect after surgery and day to day life.  Reviewed procedure, what to expect the day of surgery and days after. Restrictions reviewed and things to look for and expect such as pain, gas pain/bloating, incision care, lifting restrictions.  Pt stated this was helpful. Inquired on paper work Dorita DAVE was to send and states she has not received it.  Transferred call to surgery scheduling.

## 2018-08-31 ENCOUNTER — ANESTHESIA EVENT (OUTPATIENT)
Dept: SURGERY | Facility: CLINIC | Age: 43
End: 2018-08-31
Payer: OTHER GOVERNMENT

## 2018-08-31 ENCOUNTER — SURGERY (OUTPATIENT)
Age: 43
End: 2018-08-31
Payer: OTHER GOVERNMENT

## 2018-08-31 ENCOUNTER — HOSPITAL ENCOUNTER (OUTPATIENT)
Facility: CLINIC | Age: 43
Discharge: HOME | End: 2018-08-31
Attending: OBSTETRICS & GYNECOLOGY | Admitting: OBSTETRICS & GYNECOLOGY
Payer: OTHER GOVERNMENT

## 2018-08-31 ENCOUNTER — ANESTHESIA (OUTPATIENT)
Dept: SURGERY | Facility: CLINIC | Age: 43
End: 2018-08-31
Payer: OTHER GOVERNMENT

## 2018-08-31 VITALS
DIASTOLIC BLOOD PRESSURE: 68 MMHG | WEIGHT: 213.5 LBS | RESPIRATION RATE: 16 BRPM | TEMPERATURE: 96.8 F | SYSTOLIC BLOOD PRESSURE: 112 MMHG | HEART RATE: 69 BPM | HEIGHT: 67 IN | OXYGEN SATURATION: 100 % | BODY MASS INDEX: 33.51 KG/M2

## 2018-08-31 DIAGNOSIS — G89.18 ACUTE POST-OPERATIVE PAIN: Primary | ICD-10-CM

## 2018-08-31 LAB — B-HCG SERPL-ACNC: <1 IU/L (ref 0–5)

## 2018-08-31 PROCEDURE — 88300 SURGICAL PATH GROSS: CPT | Mod: 26 | Performed by: OBSTETRICS & GYNECOLOGY

## 2018-08-31 PROCEDURE — 58661 LAPAROSCOPY REMOVE ADNEXA: CPT | Performed by: OBSTETRICS & GYNECOLOGY

## 2018-08-31 PROCEDURE — 36000056 ZZH SURGERY LEVEL 3 1ST 30 MIN: Performed by: OBSTETRICS & GYNECOLOGY

## 2018-08-31 PROCEDURE — 25000128 H RX IP 250 OP 636: Performed by: NURSE ANESTHETIST, CERTIFIED REGISTERED

## 2018-08-31 PROCEDURE — 88305 TISSUE EXAM BY PATHOLOGIST: CPT | Mod: 26 | Performed by: OBSTETRICS & GYNECOLOGY

## 2018-08-31 PROCEDURE — 27210794 ZZH OR GENERAL SUPPLY STERILE: Performed by: OBSTETRICS & GYNECOLOGY

## 2018-08-31 PROCEDURE — 25000128 H RX IP 250 OP 636: Performed by: OBSTETRICS & GYNECOLOGY

## 2018-08-31 PROCEDURE — 25000125 ZZHC RX 250: Performed by: ANESTHESIOLOGY

## 2018-08-31 PROCEDURE — 71000027 ZZH RECOVERY PHASE 2 EACH 15 MINS: Performed by: OBSTETRICS & GYNECOLOGY

## 2018-08-31 PROCEDURE — 71000012 ZZH RECOVERY PHASE 1 LEVEL 1 FIRST HR: Performed by: OBSTETRICS & GYNECOLOGY

## 2018-08-31 PROCEDURE — 25000128 H RX IP 250 OP 636: Performed by: ANESTHESIOLOGY

## 2018-08-31 PROCEDURE — 25000132 ZZH RX MED GY IP 250 OP 250 PS 637: Performed by: OBSTETRICS & GYNECOLOGY

## 2018-08-31 PROCEDURE — 88300 SURGICAL PATH GROSS: CPT | Performed by: OBSTETRICS & GYNECOLOGY

## 2018-08-31 PROCEDURE — 88302 TISSUE EXAM BY PATHOLOGIST: CPT | Mod: 26 | Performed by: OBSTETRICS & GYNECOLOGY

## 2018-08-31 PROCEDURE — 37000008 ZZH ANESTHESIA TECHNICAL FEE, 1ST 30 MIN: Performed by: OBSTETRICS & GYNECOLOGY

## 2018-08-31 PROCEDURE — 25000125 ZZHC RX 250: Performed by: OBSTETRICS & GYNECOLOGY

## 2018-08-31 PROCEDURE — 36000058 ZZH SURGERY LEVEL 3 EA 15 ADDTL MIN: Performed by: OBSTETRICS & GYNECOLOGY

## 2018-08-31 PROCEDURE — 58120 DILATION AND CURETTAGE: CPT | Mod: 51 | Performed by: OBSTETRICS & GYNECOLOGY

## 2018-08-31 PROCEDURE — 37000009 ZZH ANESTHESIA TECHNICAL FEE, EACH ADDTL 15 MIN: Performed by: OBSTETRICS & GYNECOLOGY

## 2018-08-31 PROCEDURE — 40000170 ZZH STATISTIC PRE-PROCEDURE ASSESSMENT II: Performed by: OBSTETRICS & GYNECOLOGY

## 2018-08-31 PROCEDURE — 71000013 ZZH RECOVERY PHASE 1 LEVEL 1 EA ADDTL HR: Performed by: OBSTETRICS & GYNECOLOGY

## 2018-08-31 PROCEDURE — 25000125 ZZHC RX 250: Performed by: NURSE ANESTHETIST, CERTIFIED REGISTERED

## 2018-08-31 PROCEDURE — 84702 CHORIONIC GONADOTROPIN TEST: CPT | Performed by: OBSTETRICS & GYNECOLOGY

## 2018-08-31 PROCEDURE — 25000566 ZZH SEVOFLURANE, EA 15 MIN: Performed by: OBSTETRICS & GYNECOLOGY

## 2018-08-31 PROCEDURE — 88305 TISSUE EXAM BY PATHOLOGIST: CPT | Performed by: OBSTETRICS & GYNECOLOGY

## 2018-08-31 PROCEDURE — 88302 TISSUE EXAM BY PATHOLOGIST: CPT | Performed by: OBSTETRICS & GYNECOLOGY

## 2018-08-31 PROCEDURE — 36415 COLL VENOUS BLD VENIPUNCTURE: CPT | Performed by: OBSTETRICS & GYNECOLOGY

## 2018-08-31 RX ORDER — LIDOCAINE HYDROCHLORIDE 20 MG/ML
INJECTION, SOLUTION INFILTRATION; PERINEURAL PRN
Status: DISCONTINUED | OUTPATIENT
Start: 2018-08-31 | End: 2018-08-31

## 2018-08-31 RX ORDER — FENTANYL CITRATE 50 UG/ML
INJECTION, SOLUTION INTRAMUSCULAR; INTRAVENOUS PRN
Status: DISCONTINUED | OUTPATIENT
Start: 2018-08-31 | End: 2018-08-31

## 2018-08-31 RX ORDER — SODIUM CHLORIDE, SODIUM LACTATE, POTASSIUM CHLORIDE, CALCIUM CHLORIDE 600; 310; 30; 20 MG/100ML; MG/100ML; MG/100ML; MG/100ML
INJECTION, SOLUTION INTRAVENOUS CONTINUOUS
Status: DISCONTINUED | OUTPATIENT
Start: 2018-08-31 | End: 2018-08-31 | Stop reason: HOSPADM

## 2018-08-31 RX ORDER — HYDROMORPHONE HYDROCHLORIDE 1 MG/ML
.3-.5 INJECTION, SOLUTION INTRAMUSCULAR; INTRAVENOUS; SUBCUTANEOUS EVERY 10 MIN PRN
Status: DISCONTINUED | OUTPATIENT
Start: 2018-08-31 | End: 2018-08-31 | Stop reason: HOSPADM

## 2018-08-31 RX ORDER — NEOSTIGMINE METHYLSULFATE 1 MG/ML
VIAL (ML) INJECTION PRN
Status: DISCONTINUED | OUTPATIENT
Start: 2018-08-31 | End: 2018-08-31

## 2018-08-31 RX ORDER — BUPIVACAINE HYDROCHLORIDE AND EPINEPHRINE 5; 5 MG/ML; UG/ML
INJECTION, SOLUTION PERINEURAL PRN
Status: DISCONTINUED | OUTPATIENT
Start: 2018-08-31 | End: 2018-08-31 | Stop reason: HOSPADM

## 2018-08-31 RX ORDER — GLYCOPYRROLATE 0.2 MG/ML
INJECTION, SOLUTION INTRAMUSCULAR; INTRAVENOUS PRN
Status: DISCONTINUED | OUTPATIENT
Start: 2018-08-31 | End: 2018-08-31

## 2018-08-31 RX ORDER — ONDANSETRON 4 MG/1
4 TABLET, ORALLY DISINTEGRATING ORAL EVERY 30 MIN PRN
Status: DISCONTINUED | OUTPATIENT
Start: 2018-08-31 | End: 2018-08-31 | Stop reason: HOSPADM

## 2018-08-31 RX ORDER — HYDROCODONE BITARTRATE AND ACETAMINOPHEN 5; 325 MG/1; MG/1
1-2 TABLET ORAL EVERY 4 HOURS PRN
Qty: 20 TABLET | Refills: 0 | Status: SHIPPED | OUTPATIENT
Start: 2018-08-31 | End: 2023-12-14

## 2018-08-31 RX ORDER — CEFAZOLIN SODIUM 1 G/3ML
1 INJECTION, POWDER, FOR SOLUTION INTRAMUSCULAR; INTRAVENOUS SEE ADMIN INSTRUCTIONS
Status: DISCONTINUED | OUTPATIENT
Start: 2018-08-31 | End: 2018-08-31 | Stop reason: HOSPADM

## 2018-08-31 RX ORDER — ALBUTEROL SULFATE 0.83 MG/ML
2.5 SOLUTION RESPIRATORY (INHALATION) EVERY 4 HOURS PRN
Status: DISCONTINUED | OUTPATIENT
Start: 2018-08-31 | End: 2018-08-31 | Stop reason: HOSPADM

## 2018-08-31 RX ORDER — ONDANSETRON 2 MG/ML
4 INJECTION INTRAMUSCULAR; INTRAVENOUS EVERY 30 MIN PRN
Status: DISCONTINUED | OUTPATIENT
Start: 2018-08-31 | End: 2018-08-31 | Stop reason: HOSPADM

## 2018-08-31 RX ORDER — DEXAMETHASONE SODIUM PHOSPHATE 4 MG/ML
INJECTION, SOLUTION INTRA-ARTICULAR; INTRALESIONAL; INTRAMUSCULAR; INTRAVENOUS; SOFT TISSUE PRN
Status: DISCONTINUED | OUTPATIENT
Start: 2018-08-31 | End: 2018-08-31

## 2018-08-31 RX ORDER — MEPERIDINE HYDROCHLORIDE 25 MG/ML
12.5 INJECTION INTRAMUSCULAR; INTRAVENOUS; SUBCUTANEOUS
Status: DISCONTINUED | OUTPATIENT
Start: 2018-08-31 | End: 2018-08-31 | Stop reason: HOSPADM

## 2018-08-31 RX ORDER — BUPIVACAINE HYDROCHLORIDE AND EPINEPHRINE 5; 5 MG/ML; UG/ML
INJECTION, SOLUTION EPIDURAL; INTRACAUDAL; PERINEURAL
Status: DISCONTINUED
Start: 2018-08-31 | End: 2018-08-31 | Stop reason: HOSPADM

## 2018-08-31 RX ORDER — IBUPROFEN 600 MG/1
600 TABLET, FILM COATED ORAL EVERY 6 HOURS PRN
Qty: 30 TABLET | Refills: 0 | Status: SHIPPED | OUTPATIENT
Start: 2018-08-31 | End: 2023-12-14

## 2018-08-31 RX ORDER — FENTANYL CITRATE 50 UG/ML
25-50 INJECTION, SOLUTION INTRAMUSCULAR; INTRAVENOUS
Status: DISCONTINUED | OUTPATIENT
Start: 2018-08-31 | End: 2018-08-31 | Stop reason: HOSPADM

## 2018-08-31 RX ORDER — NALOXONE HYDROCHLORIDE 0.4 MG/ML
.1-.4 INJECTION, SOLUTION INTRAMUSCULAR; INTRAVENOUS; SUBCUTANEOUS
Status: DISCONTINUED | OUTPATIENT
Start: 2018-08-31 | End: 2018-08-31 | Stop reason: HOSPADM

## 2018-08-31 RX ORDER — CEFAZOLIN SODIUM 2 G/100ML
2 INJECTION, SOLUTION INTRAVENOUS
Status: COMPLETED | OUTPATIENT
Start: 2018-08-31 | End: 2018-08-31

## 2018-08-31 RX ORDER — PROPOFOL 10 MG/ML
INJECTION, EMULSION INTRAVENOUS PRN
Status: DISCONTINUED | OUTPATIENT
Start: 2018-08-31 | End: 2018-08-31

## 2018-08-31 RX ORDER — ONDANSETRON 2 MG/ML
INJECTION INTRAMUSCULAR; INTRAVENOUS PRN
Status: DISCONTINUED | OUTPATIENT
Start: 2018-08-31 | End: 2018-08-31

## 2018-08-31 RX ORDER — HYDROCODONE BITARTRATE AND ACETAMINOPHEN 5; 325 MG/1; MG/1
2 TABLET ORAL
Status: COMPLETED | OUTPATIENT
Start: 2018-08-31 | End: 2018-08-31

## 2018-08-31 RX ORDER — ACETAMINOPHEN 650 MG/1
650 SUPPOSITORY RECTAL EVERY 4 HOURS PRN
Status: DISCONTINUED | OUTPATIENT
Start: 2018-08-31 | End: 2018-08-31 | Stop reason: HOSPADM

## 2018-08-31 RX ORDER — PROPOFOL 10 MG/ML
INJECTION, EMULSION INTRAVENOUS CONTINUOUS PRN
Status: DISCONTINUED | OUTPATIENT
Start: 2018-08-31 | End: 2018-08-31

## 2018-08-31 RX ORDER — IBUPROFEN 600 MG/1
600 TABLET, FILM COATED ORAL
Status: DISCONTINUED | OUTPATIENT
Start: 2018-08-31 | End: 2018-08-31 | Stop reason: HOSPADM

## 2018-08-31 RX ADMIN — FENTANYL CITRATE 50 MCG: 50 INJECTION, SOLUTION INTRAMUSCULAR; INTRAVENOUS at 09:33

## 2018-08-31 RX ADMIN — ROCURONIUM BROMIDE 32 MG: 10 INJECTION INTRAVENOUS at 07:40

## 2018-08-31 RX ADMIN — FENTANYL CITRATE 50 MCG: 50 INJECTION, SOLUTION INTRAMUSCULAR; INTRAVENOUS at 09:42

## 2018-08-31 RX ADMIN — CEFAZOLIN SODIUM 2 G: 2 INJECTION, SOLUTION INTRAVENOUS at 07:46

## 2018-08-31 RX ADMIN — NEOSTIGMINE METHYLSULFATE 3 MG: 1 INJECTION, SOLUTION INTRAVENOUS at 08:47

## 2018-08-31 RX ADMIN — FENTANYL CITRATE 50 MCG: 50 INJECTION, SOLUTION INTRAMUSCULAR; INTRAVENOUS at 07:40

## 2018-08-31 RX ADMIN — SODIUM CHLORIDE, POTASSIUM CHLORIDE, SODIUM LACTATE AND CALCIUM CHLORIDE: 600; 310; 30; 20 INJECTION, SOLUTION INTRAVENOUS at 08:48

## 2018-08-31 RX ADMIN — ONDANSETRON 4 MG: 2 INJECTION INTRAMUSCULAR; INTRAVENOUS at 08:35

## 2018-08-31 RX ADMIN — ROCURONIUM BROMIDE 8 MG: 10 INJECTION INTRAVENOUS at 08:01

## 2018-08-31 RX ADMIN — SODIUM CHLORIDE, POTASSIUM CHLORIDE, SODIUM LACTATE AND CALCIUM CHLORIDE: 600; 310; 30; 20 INJECTION, SOLUTION INTRAVENOUS at 07:29

## 2018-08-31 RX ADMIN — HYDROCODONE BITARTRATE AND ACETAMINOPHEN 2 TABLET: 5; 325 TABLET ORAL at 10:34

## 2018-08-31 RX ADMIN — DEXMEDETOMIDINE HYDROCHLORIDE 8 MCG: 100 INJECTION, SOLUTION INTRAVENOUS at 08:35

## 2018-08-31 RX ADMIN — FENTANYL CITRATE 50 MCG: 50 INJECTION, SOLUTION INTRAMUSCULAR; INTRAVENOUS at 08:19

## 2018-08-31 RX ADMIN — BUPIVACAINE HYDROCHLORIDE AND EPINEPHRINE BITARTRATE 30 ML: 5; .005 INJECTION, SOLUTION PERINEURAL at 08:48

## 2018-08-31 RX ADMIN — PROPOFOL 180 MCG/KG/MIN: 10 INJECTION, EMULSION INTRAVENOUS at 07:40

## 2018-08-31 RX ADMIN — GLYCOPYRROLATE 0.8 MG: 0.2 INJECTION, SOLUTION INTRAMUSCULAR; INTRAVENOUS at 08:47

## 2018-08-31 RX ADMIN — PROPOFOL 200 MG: 10 INJECTION, EMULSION INTRAVENOUS at 07:40

## 2018-08-31 RX ADMIN — MIDAZOLAM 2 MG: 1 INJECTION INTRAMUSCULAR; INTRAVENOUS at 07:40

## 2018-08-31 RX ADMIN — DEXAMETHASONE SODIUM PHOSPHATE 4 MG: 4 INJECTION, SOLUTION INTRA-ARTICULAR; INTRALESIONAL; INTRAMUSCULAR; INTRAVENOUS; SOFT TISSUE at 07:59

## 2018-08-31 RX ADMIN — FENTANYL CITRATE 50 MCG: 50 INJECTION, SOLUTION INTRAMUSCULAR; INTRAVENOUS at 07:58

## 2018-08-31 RX ADMIN — FENTANYL CITRATE 50 MCG: 50 INJECTION, SOLUTION INTRAMUSCULAR; INTRAVENOUS at 10:05

## 2018-08-31 RX ADMIN — LIDOCAINE HYDROCHLORIDE 40 MG: 20 INJECTION, SOLUTION INFILTRATION; PERINEURAL at 07:40

## 2018-08-31 RX ADMIN — ONDANSETRON 4 MG: 4 TABLET, ORALLY DISINTEGRATING ORAL at 11:07

## 2018-08-31 ASSESSMENT — ENCOUNTER SYMPTOMS: ORTHOPNEA: 0

## 2018-08-31 NOTE — BRIEF OP NOTE
[facility] Brief Operative Note    Pre-operative diagnosis: PELVIC PAIN DUE TO ESSURE DEVICE    Post-operative diagnosis SAME, PELVIC ADHESIONS, MENORRHAGIA   Procedure: Procedure(s):  LAPAROSCOPIC BILATERAL SALPINGECTOMY FOR ESSURE REMOVAL LYSIS OF ADHESIONS AND DILATION AND CURETTAGE - Wound Class: II-Clean Contaminated   Surgeon(s): Surgeon(s) and Role:     * Patrick Mosqueda MD - Primary   Estimated blood loss: 5 ML    Specimens:   ID Type Source Tests Collected by Time Destination   A : Endometrial Curettings Tissue Uterus SURGICAL PATHOLOGY EXAM Patrick Mosqueda MD 8/31/2018  7:51 AM    B : Bilateral Fallopian Tubes Tissue Fallopian Tube, Bilateral SURGICAL PATHOLOGY EXAM Patrick Mosqueda MD 8/31/2018  8:43 AM    C : Bilateral Explanted Essure Devices Other (specify in comments) Fallopian Tube, Bilateral SURGICAL PATHOLOGY EXAM Patrick Mosqueda MD 8/31/2018  8:43 AM       Findings:  Patient was placed in dorsolithotomy position in the anterior lip of the cervix was grasped with tenaculum.  The cervix was somewhat large and bulbous and was dilated with clots at the cervical office.  Uterus was probed and sounded to 11 cm.  The uterus was then dilated to a #7 Hegar and sharp curettage was performed removing a moderate amount of endometrial tissue.  It is felt somewhat irregular on the right side.    On laparoscopy there were omental adhesions stretching across the pelvis from the left to the right side.  These are somewhat filmy adhesions but totally blocked off axis to the pelvis.  These adhesions were taken down with electrocautery.  On the right side while taking down the adhesions in the inferior epigastric vein was nicked and bleeding ensued.  This was grasped with and 2-0 Vicryl suture was used to encircle the vessel.  Additional cautery was also used.  No further bleeding was noted.    In viewing the uterus the tubes ovaries uterus was enlarged but otherwise normal in  appearance the Essure devices were found at the corneal area of the uterus.  Majority of the device was on the uterine side.  Both devices were able to be removed and a salpingectomy performed.  At the end of the case the epigastric site or bleeding had occurred was still dry.  This was checked when the pressure was relieved from the abdomen as well.

## 2018-08-31 NOTE — ANESTHESIA POSTPROCEDURE EVALUATION
Patient: Melissa Painter    Procedure(s):  LAPAROSCOPIC BILATERAL SALPINGECTOMY FOR ESSURE REMOVAL LYSIS OF ADHESIONS AND DILATION AND CURETTAGE - Wound Class: II-Clean Contaminated    Diagnosis:PELVIC PAIN DUE TO ESSURE DEVICE   Diagnosis Additional Information: No value filed.    Anesthesia Type:  General, ETT    Note:  Anesthesia Post Evaluation    Patient location during evaluation: PACU  Patient participation: Able to fully participate in evaluation  Level of consciousness: awake  Pain management: adequate  Airway patency: patent  Cardiovascular status: acceptable  Respiratory status: acceptable  Hydration status: acceptable  PONV: controlled     Anesthetic complications: None          Last vitals:  Vitals:    08/31/18 0945 08/31/18 1030 08/31/18 1109   BP: 111/71 114/75 112/68   Pulse:   69   Resp: 12 14 16   Temp: 35.6  C (96.1  F) 36  C (96.8  F)    SpO2: 99% 98% 100%         Electronically Signed By: Nunu Friend MD  August 31, 2018  3:27 PM

## 2018-08-31 NOTE — IP AVS SNAPSHOT
[facility] Same Day Surgery    [address]    [place] [address]    Phone:  [phone]    Fax:  [phone]                                       After Visit Summary   8/31/2018    Melissa Painter    MRN: [id number]           After Visit Summary Signature Page     I have received my discharge instructions, and my questions have been answered. I have discussed any challenges I see with this plan with the nurse or doctor.    ..........................................................................................................................................  Patient/Patient Representative Signature      ..........................................................................................................................................  Patient Representative Print Name and Relationship to Patient    ..................................................               ................................................  Date                                            Time    ..........................................................................................................................................  Reviewed by Signature/Title    ...................................................              ..............................................  Date                                                            Time          22EPIC Rev 08/18

## 2018-08-31 NOTE — ANESTHESIA PREPROCEDURE EVALUATION
Anesthesia Evaluation     . Pt has had prior anesthetic.     No history of anesthetic complications          ROS/MED HX    ENT/Pulmonary:     (+)allergic rhinitis, , . .   (-) sleep apnea   Neurologic: Comment: Neck and back pain      Cardiovascular:        (-) BOWIE, orthopnea/PND and syncope   METS/Exercise Tolerance:  >4 METS   Hematologic:         Musculoskeletal:         GI/Hepatic:     (+) hepatitis type B,      (-) GERD   Renal/Genitourinary:         Endo:     (+) Obesity, .      Psychiatric:         Infectious Disease:         Malignancy:         Other: Comment: Chronic fatigue   (+) H/O Chronic Pain,                   Physical Exam      Airway   Mallampati: II  TM distance: >3 FB  Neck ROM: full    Dental   (+) caps    Cardiovascular   Rhythm and rate: regular      Pulmonary    breath sounds clear to auscultation                    Anesthesia Plan      History & Physical Review  History and physical reviewed and following examination; no interval change.    ASA Status:  2 .        Plan for General and ETT   PONV prophylaxis:  Ondansetron (or other 5HT-3) and Dexamethasone or Solumedrol  Additional equipment: Videolaryngoscope Propofol infusion      Postoperative Care      Consents  Anesthetic plan, risks, benefits and alternatives discussed with:  Patient..                          .  Procedure: Procedure(s):  LAPAROSCOPIC SALPINGECTOMY  Preop diagnosis: PELVIC PAIN DUE TO ESSURE DEVICE     Allergies   Allergen Reactions     Levaquin [Levofloxacin] Itching     Influenza Vaccine Live      Past Medical History:   Diagnosis Date     Anemia      Chronic fatigue 4/7/2016     HA (headache)      Obese      Urgency-frequency syndrome      Past Surgical History:   Procedure Laterality Date     AS HYSTEROS W PERMANENT FALLOPAIN IMPLANT  2013    ESSURE     HEAD & NECK SURGERY      WISDOM TEETH EXTRACTION     Social History   Substance Use Topics     Smoking status: Never Smoker     Smokeless tobacco: Never Used      Alcohol use 0.0 oz/week     0 Standard drinks or equivalent per week      Comment: 2-3 glasses wine/yr     Prior to Admission medications    Medication Sig Start Date End Date Taking? Authorizing Provider   CALCIUM 1000 + D 1000-800 MG-UNIT TABS TAKE ONE TABLET BY MOUTH EVERY DAY WITH FOOD FOR BONE HEALTH AND VITAMIN D SUPPLEMENTATION 9/11/17  Yes Kaci Francisco MD   cholecalciferol 5000 UNITS CAPS Take 1 capsule (5,000 Units) by mouth daily INDICATION: LOW VITAMIN D, TAKE WITH FOOD 8/18/17  Yes Kaci Francisco MD   clobetasol (TEMOVATE) 0.05 % cream Apply a thin layer to aa on hands for 1-2 weeks. Tapering with improvement and using during flares. Do not use on face or body folds 5/31/18  Yes Lauren Sanchez PA-C   clobetasol (TEMOVATE) 0.05 % external solution Apply topically At Bedtime and rub into affected areas of scalp. Never to be used on face nor groin. 2/27/18  Yes Courtney Jacobson MD   ferrous fumarate 65 mg, [place]. FE,-Vitamin C 125 mg (VITRON C)  MG TABS tablet Take 1 tablet by mouth every morning (before breakfast) INDICATION: IRON SUPPLEMENT 8/18/17  Yes Kaci Francisco MD   fluticasone (FLONASE) 50 MCG/ACT nasal spray Spray 2 sprays in nostril At Bedtime INDICATION: TO CONTROL NASAL ALLERGY SYMPTOMS 4/7/16  Yes Kaci Francisco MD   IBUPROFEN PO Take 400 mg by mouth every 6 hours as needed    Yes Reported, Patient   Prenatal Vit-Fe Fumarate-FA (PRENATAL LOW IRON) 27-1 MG TABS Take 1 tablet by mouth daily INDICATION: VITAMIN SUPPLEMENTATION 8/18/17  Yes Kaci Francisco MD   cetirizine (ZYRTEC) 10 MG tablet TAKE ONE TABLET BY MOUTH EVERY DAY TO TREAT NASAL ALLERGIES 5/4/17   Kaci Francisco MD     Current Facility-Administered Medications Ordered in Epic   Medication Dose Route Frequency Last Rate Last Dose     bupivacaine 0.5% - EPINEPHrine 1:200,000 (PF) 0.5% -1:220974 injection             ceFAZolin (ANCEF) 1 g vial to attach to  ml bag for ADULT  or 50 ml bag for PEDS  1 g Intravenous See Admin Instructions         ceFAZolin (ANCEF) intermittent infusion 2 g in 100 mL dextrose PRE-MIX  2 g Intravenous Pre-Op/Pre-procedure x 1 dose         No current [facility]-ordered outpatient prescriptions on file.       Wt Readings from Last 1 Encounters:   08/31/18 96.8 kg (213 lb 8 oz)     Temp Readings from Last 1 Encounters:   08/31/18 36.6  C (97.9  F) (Temporal)     BP Readings from Last 6 Encounters:   08/31/18 107/79   07/23/18 116/70   07/11/18 110/60   06/25/18 96/78   05/31/18 111/77   04/27/18 126/85     Pulse Readings from Last 4 Encounters:   07/23/18 60   05/31/18 73   04/24/18 72   03/17/18 72     Resp Readings from Last 1 Encounters:   08/31/18 16     SpO2 Readings from Last 1 Encounters:   05/31/18 97%     Recent Labs   Lab Test  02/20/18   1555  05/30/17   1118   NA  134  139   POTASSIUM  3.7  4.0   CHLORIDE  102  106   CO2  24  25   ANIONGAP  8  8   GLC  89  89   BUN  9  14   CR  0.71  0.71   SCAR  9.2  8.7     Recent Labs   Lab Test  02/20/18   1555  05/30/17   1118   AST  27  14   ALT  36  18   ALKPHOS  54  51   BILITOTAL  0.6  0.6     Recent Labs   Lab Test  07/11/18   1619  06/25/18   1859   WBC  4.3  5.7   HGB  11.7  11.5*   PLT  332  260     No results for input(s): ABO, RH in the last 63946 hours.  No results for input(s): INR, PTT in the last 89173 hours.   Recent Labs   Lab Test  10/24/17   1529   TROPI  <0.015     No results for input(s): PH, PCO2, PO2, HCO3 in the last 49430 hours.  No results for input(s): HCG in the last 73572 hours.  No results found for this or any previous visit (from the past 744 hour(s)).    RECENT LABS:   ECG:   ECHO:

## 2018-08-31 NOTE — ANESTHESIA CARE TRANSFER NOTE
Patient: Melissa Painter    Procedure(s):  LAPAROSCOPIC BILATERAL SALPINGECTOMY FOR ESSURE REMOVAL LYSIS OF ADHESIONS AND DILATION AND CURETTAGE - Wound Class: II-Clean Contaminated    Diagnosis: PELVIC PAIN DUE TO ESSURE DEVICE   Diagnosis Additional Information: No value filed.    Anesthesia Type:   General, ETT     Note:  Airway :Face Mask  Patient transferred to:PACU  Comments: Neuromuscular blockade reversed after TOF 4/4, spontaneous respirations, adequate tidal volumes, followed commands to voice, oropharynx suctioned with soft flexible catheter, extubated atraumatically, extubated with suction, airway patent after extubation.  Oxygen via facemask at 6 liters per minute to PACU. Oxygen tubing connected to wall O2 in PACU, SpO2, NiBP, and EKG monitors and alarms on and functioning, Michele Hugger warmer connected to patient gown, report on patient's clinical status given to PACU RN, RN questions answered. Handoff Report: Identifed the Patient, Identified the Reponsible Provider, Reviewed the pertinent medical history, Discussed the surgical course, Reviewed Intra-OP anesthesia mangement and issues during anesthesia, Set expectations for post-procedure period and Allowed opportunity for questions and acknowledgement of understanding      Vitals: (Last set prior to Anesthesia Care Transfer)    CRNA VITALS  8/31/2018 0844 - 8/31/2018 0918      8/31/2018             Pulse: 84    SpO2: 100 %    Resp Rate (observed): 15                Electronically Signed By: ISABELA Loving CRNA  August 31, 2018  9:18 AM

## 2018-08-31 NOTE — IP AVS SNAPSHOT
MRN:[id number]                      After Visit Summary   8/31/2018    Melissa Painter    MRN: [id number]           Thank you!     Thank you for choosing [facility] for your care. Our goal is always to provide you with excellent care. Hearing back from our patients is one way we can continue to improve our services. Please take a few minutes to complete the written survey that you may receive in the mail after you visit with us. Thank you!        Patient Information     Date Of Birth          1975        About your hospital stay     You were admitted on:  August 31, 2018 You last received care in the:  [facility] Same Day [facility]    You were discharged on:  August 31, 2018       Who to Call     For medical emergencies, please call 911.  For non-urgent questions about your medical care, please call your primary care provider or clinic, None  For questions related to your surgery, please call your surgery clinic        Attending Provider     Provider Patrick Clarke MD OB/Gyn       Primary Care Provider Fax #    Physician No Ref-Primary [phone]      After Care Instructions     Discharge Instructions       Patient to arrange follow up appointment in 2-3  weeks                  Further instructions from your care team       Same Day Surgery Discharge Instructions for  Sedation and General Anesthesia       It's not unusual to feel dizzy, light-headed or faint for up to 24 hours after surgery or while taking pain medication.  If you have these symptoms: sit for a few minutes before standing and have someone assist you when you get up to walk or use the bathroom.      You should rest and relax for the next 24 hours. We recommend you make arrangements to have an adult stay with you for at least 24 hours after your discharge.  Avoid hazardous and strenuous activity.      DO NOT DRIVE any vehicle or operate mechanical equipment for 24 hours following the end of your  surgery.  Even though you may feel normal, your reactions may be affected by the medication you have received.      Do not drink alcoholic beverages for 24 hours following surgery.       Slowly progress to your regular diet as you feel able. It's not unusual to feel nauseated and/or vomit after receiving anesthesia.  If you develop these symptoms, drink clear liquids (apple juice, ginger ale, broth, 7-up, etc. ) until you feel better.  If your nausea and vomiting persists for 24 hours, please notify your surgeon.        All narcotic pain medications, along with inactivity and anesthesia, can cause constipation. Drinking plenty of liquids and increasing fiber intake will help.      For any questions of a medical nature, call your surgeon.      Do not make important decisions for 24 hours.      If you had general anesthesia, you may have a sore throat for a couple of days related to the breathing tube used during surgery.  You may use Cepacol lozenges to help with this discomfort.  If it worsens or if you develop a fever, contact your surgeon.       If you feel your pain is not well managed with the pain medications prescribed by your surgeon, please contact your surgeon's office to let them know so they can address your concerns.     HOME CARE FOLLOWING LAPAROSCOPY  [facility]   [phone]      Diet  You have no restrictions on your diet.  During the evening following surgery, drink plenty of fluids and eat a light supper.    Nausea  The anesthesia may produce some nausea.  If you feel nauseated try drinking fluids such as 7-Up, tea, or soup.     Discomfort  The amount of discomfort you can expect is very unpredictable.  If you have pain that cannot be controlled with Tylenol or with the prescription you may have received, you should notify your physician.  The following complaints are not uncommon and should not be cause for concern:  1. Abdominal tenderness; abdominal cramping.  2. Low backache or  pain radiating to your shoulders, chest or back.  This is a result of the gas used to inflate your abdomen during surgery.  Lying flat in bed seems to help relieve this.   3. Sore throat for a day or two resulting from the anesthesia tube used during surgery.   4. Some bruising on your abdomen.     Drainage  You may expect a small amount of drainage from the incision on your abdomen and you may change the bandage when necessary.  You will also have a small amount of vaginal drainage for several days; this is normal and no cause for concern.  If excessive bleeding occurs, notify your physician.      If dye was used during your procedure, your urine will initially be bright blue. It will gradually return to yellow throughout the day. Drinking plenty of fluids will help to filter the dye from your urine.    Fever  A low grade fever (not over 101  Fahrenheit) is usual after this procedure.  Do not hesitate to notify your physician if your fever seems excessive.    Activity  Rest on the day of surgery then you may resume your normal activity, as tolerated. Avoid heavy lifting for one week.    You may shower.  Do not douche or use tampons.  If you also had a D&C, do not resume intercourse until bleeding has ceased.    Emergency Care  Contact your physician if you have any of these problems:   1.  A fever over 101  Fahrenheit   2.  A large amount of bleeding or drainage   3.  Severe pain    [facility]  Discharge Instructions  Following D & C / Hysteroscopy    Activity  You may resume normal activities including lifting as needed.  It is permissible to climb stairs. You may drive a car after 24 hours as long as you are not taking narcotic pain pills.   Baths or showers are perfectly acceptable.     Vaginal Discharge  You may have some vaginal bleeding or discharge for about a week after procedure.  You may use tampons or pads.    Temperature  If you develop temperature elevations to over 101  Fahrenheit,  "your physician should be called immediately.    Diet  [place] or light diet is advisable the day of surgery.  If nausea persists, continue this diet.  If severe, call.    Follow-up  Make an appointment in 1-2 weeks if instructed to at: [phone]                                                           **If you have questions or concerns about your procedure,  call Dr. Mosqueda at [phone]**            Pending Results     Date and Time Order Name Status Description    8/31/2018 0751 Surgical pathology exam In process             Admission Information     Date & Time Provider Department Dept. Phone    8/31/2018 Patrick Mosqueda MD [facility] Same Day Surgery [phone]      Your Vitals Were     Blood Pressure Temperature Respirations Height Weight Pulse Oximetry    111/71 96.1  F (35.6  C) 12 1.689 m (5' 6.5\") 96.8 kg (213 lb 8 oz) 99%    BMI (Body Mass Index)                   33.94 kg/m2           [employer] Information     [employer] gives you secure access to your electronic health record. If you see a primary care provider, you can also send messages to your care team and make appointments. If you have questions, please call your primary care clinic.  If you do not have a primary care provider, please call [phone] and they will assist you.        Care EveryWhere ID     This is your Care EveryWhere ID. This could be used by other organizations to access your [facility] medical records  [id number]        Equal Access to Services     PENNY THIBODEAUX : Hadii raleigh Viera, waaxda luadrianadaha, qaybta kaalmaprakash epps . So [facility] [phone].    ATENCIÓN: Si habla español, tiene a edwards disposición servicios gratuitos de asistencia lingüística. Llame al [phone].    We comply with applicable federal civil rights laws and [place] laws. We do not discriminate on the basis of race, color, national origin, age, disability, sex, sexual orientation, or " gender identity.               Review of your medicines      START taking        Dose / Directions    HYDROcodone-acetaminophen 5-325 MG per tablet   Commonly known as:  NORCO   Used for:  Acute post-operative pain        Dose:  1-2 tablet   Take 1-2 tablets by mouth every 4 hours as needed for other (Moderate to Severe Pain)   Quantity:  20 tablet   Refills:  0         CONTINUE these medicines which may have CHANGED, or have new prescriptions. If we are uncertain of the size of tablets/capsules you have at home, strength may be listed as something that might have changed.        Dose / Directions    * IBUPROFEN PO   This may have changed:  Another medication with the same name was added. Make sure you understand how and when to take each.        Dose:  400 mg   Take 400 mg by mouth every 6 hours as needed   Refills:  0       * ibuprofen 600 MG tablet   Commonly known as:  ADVIL/MOTRIN   This may have changed:  You were already taking a medication with the same name, and this prescription was added. Make sure you understand how and when to take each.   Used for:  Acute post-operative pain        Dose:  600 mg   Take 1 tablet (600 mg) by mouth every 6 hours as needed for pain (mild)   Quantity:  30 tablet   Refills:  0       * Notice:  This list has 2 medication(s) that are the same as other medications prescribed for you. Read the directions carefully, and ask your doctor or other care provider to review them with you.      CONTINUE these medicines which have NOT CHANGED        Dose / Directions    CALCIUM 1000 + D 1000-800 MG-UNIT Tabs   Used for:  Chronic fatigue, Vitamin D deficiency   Generic drug:  Calcium Carb-Cholecalciferol        TAKE ONE TABLET BY MOUTH EVERY DAY WITH FOOD FOR BONE HEALTH AND VITAMIN D SUPPLEMENTATION   Quantity:  100 tablet   Refills:  3       cetirizine 10 MG tablet   Commonly known as:  zyrTEC   Used for:  Seasonal allergic rhinitis        TAKE ONE TABLET BY MOUTH EVERY DAY TO TREAT  NASAL ALLERGIES   Quantity:  90 tablet   Refills:  2       cholecalciferol 5000 units Caps   Used for:  Chronic fatigue        Dose:  1 capsule   Take 1 capsule (5,000 Units) by mouth daily INDICATION: LOW VITAMIN D, TAKE WITH FOOD   Quantity:  90 capsule   Refills:  3       * clobetasol 0.05 % external solution   Commonly known as:  TEMOVATE   Used for:  Lichen planopilaris        Apply topically At Bedtime and rub into affected areas of scalp. Never to be used on face nor groin.   Quantity:  50 mL   Refills:  1       * clobetasol 0.05 % cream   Commonly known as:  TEMOVATE   Used for:  Contact dermatitis, unspecified contact dermatitis type, unspecified trigger        Apply a thin layer to aa on hands for 1-2 weeks. Tapering with improvement and using during flares. Do not use on face or body folds   Quantity:  60 g   Refills:  0       ferrous fumarate 65 mg ([place]. FE)-Vitamin C 125 mg  MG Tabs tablet   Commonly known as:  VITRON C   Used for:  Chronic fatigue, Iron deficiency anemia, unspecified iron deficiency anemia type        Dose:  1 tablet   Take 1 tablet by mouth every morning (before breakfast) INDICATION: IRON SUPPLEMENT   Quantity:  100 tablet   Refills:  prn       fluticasone 50 MCG/ACT spray   Commonly known as:  FLONASE   Used for:  Seasonal allergic rhinitis        Dose:  2 spray   Spray 2 sprays in nostril At Bedtime INDICATION: TO CONTROL NASAL ALLERGY SYMPTOMS   Quantity:  16 g   Refills:  PRN       PRENATAL LOW IRON 27-1 MG Tabs   Used for:  Iron deficiency anemia, unspecified iron deficiency anemia type, Menorrhagia with regular cycle        Dose:  1 tablet   Take 1 tablet by mouth daily INDICATION: VITAMIN SUPPLEMENTATION   Quantity:  90 tablet   Refills:  prn       * Notice:  This list has 2 medication(s) that are the same as other medications prescribed for you. Read the directions carefully, and ask your doctor or other care provider to review them with you.         Where to get your  medicines      These medications were sent to [facility] [place], [place] - [address]  [address], [place] [address]     Phone:  [phone]     ibuprofen 600 MG tablet         Some of these will need a paper prescription and others can be bought over the counter. Ask your nurse if you have questions.     Bring a paper prescription for each of these medications     HYDROcodone-acetaminophen 5-325 MG per tablet                Protect others around you: Learn how to safely use, store and throw away your medicines at www.disposemymeds.org.        Information about OPIOIDS     PRESCRIPTION OPIOIDS: WHAT YOU NEED TO KNOW   We gave you an opioid (narcotic) pain medicine. It is important to manage your pain, but opioids are not always the best choice. You should first try all the other options your care team gave you. Take this medicine for as short a time (and as few doses) as possible.    Some activities can increase your pain, such as bandage changes or therapy sessions. It may help to take your pain medicine 30 to 60 minutes before these activities. Reduce your stress by getting enough sleep, working on hobbies you enjoy and practicing relaxation or meditation. Talk to your care team about ways to manage your pain beyond prescription opioids.    These medicines have risks:    DO NOT drive when on new or higher doses of pain medicine. These medicines can affect your alertness and reaction times, and you could be arrested for driving under the influence (DUI). If you need to use opioids long-term, talk to your care team about driving.    DO NOT operate heavy machinery    DO NOT do any other dangerous activities while taking these medicines.    DO NOT drink any alcohol while taking these medicines.     If the opioid prescribed includes acetaminophen, DO NOT take with any other medicines that contain acetaminophen. Read all labels carefully. Look for the word  acetaminophen  or  Tylenol.   Ask your pharmacist if you have questions or are unsure.    You can get addicted to pain medicines, especially if you have a history of addiction (chemical, alcohol or substance dependence). Talk to your care team about ways to reduce this risk.    All opioids tend to cause constipation. Drink plenty of water and eat foods that have a lot of fiber, such as fruits, vegetables, prune juice, apple juice and high-fiber cereal. Take a laxative (Miralax, milk of magnesia, Colace, Senna) if you don t move your bowels at least every other day. Other side effects include upset stomach, sleepiness, dizziness, throwing up, tolerance (needing more of the medicine to have the same effect), physical dependence and slowed breathing.    Store your pills in a secure place, locked if possible. We will not replace any lost or stolen medicine. If you don t finish your medicine, please throw away (dispose) as directed by your pharmacist. The [place] Pollution Control Agency has more information about safe disposal: https://www.pca.[email or web address].[email or web address]/living-green/[email or web address]-unwanted-medications             Medication List: This is a list of all your medications and when to take them. Check marks below indicate your daily home schedule. Keep this list as a reference.      Medications           Morning Afternoon Evening Bedtime As Needed    CALCIUM 1000 + D 1000-800 MG-UNIT Tabs   TAKE ONE TABLET BY MOUTH EVERY DAY WITH FOOD FOR BONE HEALTH AND VITAMIN D SUPPLEMENTATION   Generic drug:  Calcium Carb-Cholecalciferol                                cetirizine 10 MG tablet   Commonly known as:  zyrTEC   TAKE ONE TABLET BY MOUTH EVERY DAY TO TREAT NASAL ALLERGIES                                cholecalciferol 5000 units Caps   Take 1 capsule (5,000 Units) by mouth daily INDICATION: LOW VITAMIN D, TAKE WITH FOOD                                * clobetasol 0.05 % external solution   Commonly known as:  TEMOVATE   Apply topically At Bedtime and rub into  affected areas of scalp. Never to be used on face nor groin.                                * clobetasol 0.05 % cream   Commonly known as:  TEMOVATE   Apply a thin layer to aa on hands for 1-2 weeks. Tapering with improvement and using during flares. Do not use on face or body folds                                ferrous fumarate 65 mg ([place]. FE)-Vitamin C 125 mg  MG Tabs tablet   Commonly known as:  VITRON C   Take 1 tablet by mouth every morning (before breakfast) INDICATION: IRON SUPPLEMENT                                fluticasone 50 MCG/ACT spray   Commonly known as:  FLONASE   Spray 2 sprays in nostril At Bedtime INDICATION: TO CONTROL NASAL ALLERGY SYMPTOMS                                HYDROcodone-acetaminophen 5-325 MG per tablet   Commonly known as:  NORCO   Take 1-2 tablets by mouth every 4 hours as needed for other (Moderate to Severe Pain)   Last time this was given:  2 tablets on 8/31/2018 10:34 AM   Last time this was given:  2 tabs given at 10:35am                                  * IBUPROFEN PO   Take 400 mg by mouth every 6 hours as needed                                * ibuprofen 600 MG tablet   Commonly known as:  ADVIL/MOTRIN   Take 1 tablet (600 mg) by mouth every 6 hours as needed for pain (mild)                                PRENATAL LOW IRON 27-1 MG Tabs   Take 1 tablet by mouth daily INDICATION: VITAMIN SUPPLEMENTATION                                * Notice:  This list has 4 medication(s) that are the same as other medications prescribed for you. Read the directions carefully, and ask your doctor or other care provider to review them with you.

## 2018-08-31 NOTE — OP NOTE
Procedure Date: 2018      PREOPERATIVE STATUS:  The patient is a 43-year-old  5, para 4, who in 2013 had an Essure procedure.  Following the procedure, she noticed hair loss and weight gain.  She also noted a change in her menstrual cycle, which has become irregular.  She will tend to bleed off and on for 2 weeks.  Recent pelvic ultrasound was normal.  The patient complains of pelvic pain on the right side greater than the left.  She also complains of fatigue, headaches and joint pain with occasional swollen ankles.  She is admitted for removal of the Essure devices.      POSTOPERATIVE DIAGNOSIS:  The patient is a 43-year-old  5, para 4, who in 2013 had an Essure procedure.  Following the procedure, she noticed hair loss and weight gain.  She also noted a change in her menstrual cycle, which has become irregular.  She will tend to bleed off and on for 2 weeks.  Recent pelvic ultrasound was normal.  The patient complains of pelvic pain on the right side greater than the left.  She also complains of fatigue, headaches and joint pain with occasional swollen ankles.  She is admitted for removal of the Essure devices.  Pathology pending on endometrial curettings and tubes.      OPERATIVE PROCEDURE:  Laparoscopic removal of bilateral Essure devices, bilateral salpingectomy, D&C suturing of epigastric vessel, right side.      SURGEON:  Patrick Mosqueda MD      ANESTHESIA:  General.      OPERATIVE FINDINGS:  After placing the patient in dorsal lithotomy position and inserting a speculum, the cervix was noted to be quite large and bulbous.  A tenaculum was placed and there were clots at the cervical os.  The cervix was probed, sounded to 11 cm.  The endocervical canal was dilated to a #7 Hegar.  Because of the clots, it was elected to perform curettage.  There was a moderate amount of tissue removed.  The cavity felt somewhat irregular on the patient's right side.  On traction, the cervix came down to just  above the introitus.      On laparoscopic examination, the patient's pelvis was completely obstructed by a film of omentum that stretched across the lower abdomen.  This was taken down with electrocautery and in the process, the right epigastric vessel below the trocar site was superficially injured and venous bleeding ensued.  The bleeder was grasped with a clamp and a 2-0 Vicryl pursestring suture was placed around the bleeder.  In addition, bipolar cautery was used to coagulate the area.  There appeared to be no further bleeding at that point.  Once the pelvis was inspected, the tubes and ovaries appeared grossly normal.  Uterus was enlarged and boggy.  The pelvic area  showed no evidence of endometriosis.  Using the bipolar spatula, the tubes were opened at the cornual portion.  Most of the device appeared to be on the uterine side.  The devices were removed bilaterally, and the salpingectomy was performed.  At the end of the case, final inspection of the bleeding site where the epigastric had been injured showed no bleeding even under no abdominal pressure.      OPERATIVE PROCEDURE:  Under general endotracheal anesthesia, the patient was placed in dorsal lithotomy position and prepped and draped in the usual fashion.  Speculum was placed in the vagina, anterior lip of the cervix grasped with a tenaculum and the uterus probed, sounded to 11 cm.  There were clots coming from the uterus.  A curettage was performed with a moderate amount of tissue removed.  The RACHAEL manipulator was then placed in the cavity of the uterus.  Straight catheter was used to drain the bladder.      Through an infraumbilical incision, laparoscopic trocars and sheaths were introduced.  Laparoscope was introduced confirming intraperitoneal position.  Through separate lower quadrant incisions, assisting trocars and sheaths were introduced.  The pelvis and upper abdomen were inspected with findings as noted.  Using the bipolar spatula, the  omental curtain was removed across the pelvis.  In the process of taking it down from the anterior peritoneum on the right side, the inferior epigastric vein was opened.  This was immediately grasped with a clamp and using a 2-0 Vicryl suture internally, an encircling stitch was placed around the bleeder.  The bleeder was then also treated with the bipolar cautery.      On inspection of the pelvis, the tubes and ovaries again appeared grossly normal.  The devices were partially in the tubes but mostly on the uterine side.  The cornual portion of the uterus was opened and the Essure devices were identified and then removed from the tube and the uterus.  Once they were removed, the bipolar cutting forceps was used to do a bilateral salpingectomy by coming across the mesosalpinx.  The devices and the tubes were removed from the abdomen.  The abdomen was thoroughly irrigated.  The gas was released and then the site where the epigastric vessel had been bleeding was inspected and there was no further bleeding.  The instruments were removed, and incisions were closed with subcuticular 4-0 Vicryl sutures and Steri-Strips.  Total estimated blood loss was 5 mL.         LILY GONSALVES MD             D: 2018   T: 2018   MT: JJ      Name:     TARSHA SOLANO   MRN:      [id number]        Account:        [id number]   :      1975           Procedure Date: 2018      Document: [id number]

## 2018-08-31 NOTE — DISCHARGE INSTRUCTIONS
Same Day Surgery Discharge Instructions for  Sedation and General Anesthesia       It's not unusual to feel dizzy, light-headed or faint for up to 24 hours after surgery or while taking pain medication.  If you have these symptoms: sit for a few minutes before standing and have someone assist you when you get up to walk or use the bathroom.      You should rest and relax for the next 24 hours. We recommend you make arrangements to have an adult stay with you for at least 24 hours after your discharge.  Avoid hazardous and strenuous activity.      DO NOT DRIVE any vehicle or operate mechanical equipment for 24 hours following the end of your surgery.  Even though you may feel normal, your reactions may be affected by the medication you have received.      Do not drink alcoholic beverages for 24 hours following surgery.       Slowly progress to your regular diet as you feel able. It's not unusual to feel nauseated and/or vomit after receiving anesthesia.  If you develop these symptoms, drink clear liquids (apple juice, ginger ale, broth, 7-up, etc. ) until you feel better.  If your nausea and vomiting persists for 24 hours, please notify your surgeon.        All narcotic pain medications, along with inactivity and anesthesia, can cause constipation. Drinking plenty of liquids and increasing fiber intake will help.      For any questions of a medical nature, call your surgeon.      Do not make important decisions for 24 hours.      If you had general anesthesia, you may have a sore throat for a couple of days related to the breathing tube used during surgery.  You may use Cepacol lozenges to help with this discomfort.  If it worsens or if you develop a fever, contact your surgeon.       If you feel your pain is not well managed with the pain medications prescribed by your surgeon, please contact your surgeon's office to let them know so they can address your concerns.     HOME CARE FOLLOWING LAPAROSCOPY  Anne[facility]Marie  [facility]   [phone]      Diet  You have no restrictions on your diet.  During the evening following surgery, drink plenty of fluids and eat a light supper.    Nausea  The anesthesia may produce some nausea.  If you feel nauseated try drinking fluids such as 7-Up, tea, or soup.     Discomfort  The amount of discomfort you can expect is very unpredictable.  If you have pain that cannot be controlled with Tylenol or with the prescription you may have received, you should notify your physician.  The following complaints are not uncommon and should not be cause for concern:  1. Abdominal tenderness; abdominal cramping.  2. Low backache or pain radiating to your shoulders, chest or back.  This is a result of the gas used to inflate your abdomen during surgery.  Lying flat in bed seems to help relieve this.   3. Sore throat for a day or two resulting from the anesthesia tube used during surgery.   4. Some bruising on your abdomen.     Drainage  You may expect a small amount of drainage from the incision on your abdomen and you may change the bandage when necessary.  You will also have a small amount of vaginal drainage for several days; this is normal and no cause for concern.  If excessive bleeding occurs, notify your physician.      If dye was used during your procedure, your urine will initially be bright blue. It will gradually return to yellow throughout the day. Drinking plenty of fluids will help to filter the dye from your urine.    Fever  A low grade fever (not over 101  Fahrenheit) is usual after this procedure.  Do not hesitate to notify your physician if your fever seems excessive.    Activity  Rest on the day of surgery then you may resume your normal activity, as tolerated. Avoid heavy lifting for one week.    You may shower.  Do not douche or use tampons.  If you also had a D&C, do not resume intercourse until bleeding has ceased.    Emergency Care  Contact your physician if you have any of these  problems:   1.  A fever over 101  Fahrenheit   2.  A large amount of bleeding or drainage   3.  Severe pain    [facility]  Discharge Instructions  Following D & C / Hysteroscopy    Activity  You may resume normal activities including lifting as needed.  It is permissible to climb stairs. You may drive a car after 24 hours as long as you are not taking narcotic pain pills.   Baths or showers are perfectly acceptable.     Vaginal Discharge  You may have some vaginal bleeding or discharge for about a week after procedure.  You may use tampons or pads.    Temperature  If you develop temperature elevations to over 101  Fahrenheit, your physician should be called immediately.    Diet  Kendrick or light diet is advisable the day of surgery.  If nausea persists, continue this diet.  If severe, call.    Follow-up  Make an appointment in 1-2 weeks if instructed to at: [phone]                                                           **If you have questions or concerns about your procedure,  call Dr. Mosqueda at [phone]**

## 2018-09-04 LAB — COPATH REPORT: NORMAL

## 2018-09-12 ENCOUNTER — OFFICE VISIT (OUTPATIENT)
Dept: OBGYN | Facility: CLINIC | Age: 43
End: 2018-09-12
Payer: OTHER GOVERNMENT

## 2018-09-12 VITALS — DIASTOLIC BLOOD PRESSURE: 78 MMHG | WEIGHT: 210 LBS | SYSTOLIC BLOOD PRESSURE: 110 MMHG | BODY MASS INDEX: 33.39 KG/M2

## 2018-09-12 DIAGNOSIS — Z48.816 AFTERCARE FOLLOWING SURGERY OF THE GENITOURINARY SYSTEM: Primary | ICD-10-CM

## 2018-09-12 LAB — HGB BLD-MCNC: 11.6 G/DL (ref 11.7–15.7)

## 2018-09-12 PROCEDURE — 99024 POSTOP FOLLOW-UP VISIT: CPT | Performed by: OBSTETRICS & GYNECOLOGY

## 2018-09-12 PROCEDURE — 85018 HEMOGLOBIN: CPT | Performed by: OBSTETRICS & GYNECOLOGY

## 2018-09-12 PROCEDURE — 36415 COLL VENOUS BLD VENIPUNCTURE: CPT | Performed by: OBSTETRICS & GYNECOLOGY

## 2018-09-12 NOTE — PROGRESS NOTES
SUBJECTIVE:                                                   Melissa Painter is a 43 year old female who presents to clinic today for the following health issue(s):  Patient presents with:  Surgical Followup: 18 Laparoscopic Salpingectomy      HPI:  Patient is now 2 weeks post D&C with laparoscopic removal of Essure devices and bilateral salpingectomy.  Her surgery was complicated by a right epigastric bleeder which was controlled with pressure and bipolar.  She has noticed marked improvement.  Pain in her legs is gone.  She feels as though she is getting back to normal.    No LMP recorded. Patient is not currently having periods (Reason: Irregular Periods)..   Patient is sexually active, .  Using none for contraception.    reports that she has never smoked. She has never used smokeless tobacco.    STD testing offered?  Declined    Health maintenance updated:  yes    Today's PHQ-2 Score:   PHQ-2 (  [employer]) 2017   Q1: Little interest or pleasure in doing things 0   Q2: Feeling down, depressed or hopeless 0   PHQ-2 Score 0     Today's PHQ-9 Score:   PHQ-9 SCORE 2018   Total Score 0     Today's REGINALDO-7 Score:   REGINALDO-7 SCORE 2018   Total Score 0       Problem list and histories reviewed & adjusted, as indicated.  Additional history: as documented.    Patient Active Problem List   Diagnosis     Iron deficiency anemia, unspecified iron deficiency     Chronic fatigue     Vitamin D deficiency     Hair loss     Immunity status testing     Excessive or frequent menstruation     CARDIOVASCULAR SCREENING; LDL GOAL LESS THAN 160     Seasonal allergic rhinitis     Rectocele     Cystocele, midline     Urgency-frequency syndrome     Cervicalgia     Acute bilateral low back pain without sciatica     Hepatitis B carrier (H)     Acute right-sided low back pain without sciatica     Dyspareunia, female     High-tone pelvic floor dysfunction     Past Surgical History:   Procedure Laterality Date     AS  HYSTEROS W PERMANENT FALLOPAIN IMPLANT  2013    ESSURE     HEAD & NECK SURGERY      WISDOM TEETH EXTRACTION     LAPAROSCOPIC SALPINGECTOMY Bilateral 8/31/2018    Procedure: LAPAROSCOPIC SALPINGECTOMY;  LAPAROSCOPIC BILATERAL SALPINGECTOMY FOR ESSURE REMOVAL LYSIS OF ADHESIONS AND DILATION AND CURETTAGE;  Surgeon: Patrick Mosqueda MD;  Location: [facility]      Social History   Substance Use Topics     Smoking status: Never Smoker     Smokeless tobacco: Never Used     Alcohol use 0.0 oz/week     0 Standard drinks or equivalent per week      Comment: 2-3 glasses wine/yr      Problem (# of Occurrences) Relation (Name,Age of Onset)    Hypertension (1) Mother            Current Outpatient Prescriptions   Medication Sig     CALCIUM 1000 + D 1000-800 MG-UNIT TABS TAKE ONE TABLET BY MOUTH EVERY DAY WITH FOOD FOR BONE HEALTH AND VITAMIN D SUPPLEMENTATION     cetirizine (ZYRTEC) 10 MG tablet TAKE ONE TABLET BY MOUTH EVERY DAY TO TREAT NASAL ALLERGIES     cholecalciferol 5000 UNITS CAPS Take 1 capsule (5,000 Units) by mouth daily INDICATION: LOW VITAMIN D, TAKE WITH FOOD     clobetasol (TEMOVATE) 0.05 % cream Apply a thin layer to aa on hands for 1-2 weeks. Tapering with improvement and using during flares. Do not use on face or body folds     clobetasol (TEMOVATE) 0.05 % external solution Apply topically At Bedtime and rub into affected areas of scalp. Never to be used on face nor groin.     ferrous fumarate 65 mg, [place]. FE,-Vitamin C 125 mg (VITRON C)  MG TABS tablet Take 1 tablet by mouth every morning (before breakfast) INDICATION: IRON SUPPLEMENT     fluticasone (FLONASE) 50 MCG/ACT nasal spray Spray 2 sprays in nostril At Bedtime INDICATION: TO CONTROL NASAL ALLERGY SYMPTOMS     HYDROcodone-acetaminophen (NORCO) 5-325 MG per tablet Take 1-2 tablets by mouth every 4 hours as needed for other (Moderate to Severe Pain)     ibuprofen (ADVIL/MOTRIN) 600 MG tablet Take 1 tablet (600 mg) by mouth every 6 hours as needed  for pain (mild)     IBUPROFEN PO Take 400 mg by mouth every 6 hours as needed      Prenatal Vit-Fe Fumarate-FA (PRENATAL LOW IRON) 27-1 MG TABS Take 1 tablet by mouth daily INDICATION: VITAMIN SUPPLEMENTATION     No current facility-administered medications for this visit.      Allergies   Allergen Reactions     Levaquin [Levofloxacin] Itching     Influenza Vaccine Live        ROS:  12 point review of systems negative other than symptoms noted below.    OBJECTIVE:     /78  Wt 95.3 kg (210 lb)  Breastfeeding? No  BMI 33.39 kg/m2  Body mass index is 33.39 kg/(m^2).    Exam:  Constitutional:  Appearance: Well nourished, well developed alert, in no acute distress  Neurologic/Psychiatric:  Mental Status:  Oriented X3   No Pelvic Exam performed     In-Clinic Test Results:  Results for orders placed or performed in visit on 09/12/18 (from the past 24 hour(s))   Hemoglobin   Result Value Ref Range    Hemoglobin 11.6 (L) 11.7 - 15.7 g/dL       ASSESSMENT/PLAN:                                                        ICD-10-CM    1. Aftercare following surgery of the genitourinary system Z48.816            Plan: Patient will return to clinic as needed.    Patrick Mosqueda MD  [facility][1]

## 2018-09-12 NOTE — PROGRESS NOTES
Melissa      Your results are normal.  If further questions please give me a call.    Patrick Stevens MD

## 2018-09-12 NOTE — MR AVS SNAPSHOT
After Visit Summary   9/12/2018    Melissa Painter    MRN: [id number]           Patient Information     Date Of Birth          1975        Visit Information        Provider Department      9/12/2018 2:00 PM Patrick Mosqueda MD [facility]        Today's Diagnoses     Aftercare following surgery of the genitourinary system    -  1       Follow-ups after your visit        Who to contact     If you have questions or need follow up information about today's clinic visit or your schedule please contact [facility] directly at [phone].  Normal or non-critical lab and imaging results will be communicated to you by [employer], letter or phone within 4 business days after the clinic has received the results. If you do not hear from us within 7 days, please contact the clinic through [employer] or phone. If you have a critical or abnormal lab result, we will notify you by phone as soon as possible.  Submit refill requests through [employer] or call your pharmacy and they will forward the refill request to us. Please allow 3 business days for your refill to be completed.          Additional Information About Your Visit        MyChart Information     [employer] gives you secure access to your electronic health record. If you see a primary care provider, you can also send messages to your care team and make appointments. If you have questions, please call your primary care clinic.  If you do not have a primary care provider, please call [phone] and they will assist you.        Care EveryWhere ID     This is your Care EveryWhere ID. This could be used by other organizations to access your [facility] medical records  [id number]        Your Vitals Were     Breastfeeding? BMI (Body Mass Index)                No 33.39 kg/m2           Blood Pressure from Last 3 Encounters:   09/12/18 110/78   08/31/18 112/68   07/23/18 116/70    Weight from Last 3 Encounters:   09/12/18  95.3 kg (210 lb)   08/31/18 96.8 kg (213 lb 8 oz)   07/23/18 97.1 kg (214 lb)              Today, you had the following     No orders found for display       Primary Care Provider Fax #    Physician No Ref-Primary [phone]       No address on file        Equal Access to Services     PENNY SOTOJOSE : Hadii aad ku hadkimberlyo Soomaali, waaxda luqadaha, qaybta kaalmada tamartitofilomena, prakash muñizbrianneabhi noguera. So [facility] [place] [phone].    ATENCIÓN: Si habla español, tiene a edwards disposición servicios gratuitos de asistencia lingüística. [facility] [phone].    We comply with applicable federal civil rights laws and [place] laws. We do not discriminate on the basis of race, color, national origin, age, disability, sex, sexual orientation, or gender identity.            Thank you!     Thank you for choosing [facility]  for your care. Our goal is always to provide you with excellent care. Hearing back from our patients is one way we can continue to improve our services. Please take a few minutes to complete the written survey that you may receive in the mail after your visit with us. Thank you!             Your Updated Medication List - Protect others around you: Learn how to safely use, store and throw away your medicines at www.disposemymeds.org.          This list is accurate as of 9/12/18  2:02 PM.  Always use your most recent med list.                   Brand Name Dispense Instructions for use Diagnosis    CALCIUM 1000 + D 1000-800 MG-UNIT Tabs   Generic drug:  Calcium Carb-Cholecalciferol     100 tablet    TAKE ONE TABLET BY MOUTH EVERY DAY WITH FOOD FOR BONE HEALTH AND VITAMIN D SUPPLEMENTATION    Chronic fatigue, Vitamin D deficiency       cetirizine 10 MG tablet    zyrTEC    90 tablet    TAKE ONE TABLET BY MOUTH EVERY DAY TO TREAT NASAL ALLERGIES    Seasonal allergic rhinitis       cholecalciferol 5000 units Caps     90 capsule    Take 1 capsule (5,000 Units) by mouth daily  INDICATION: LOW VITAMIN D, TAKE WITH FOOD    Chronic fatigue       * clobetasol 0.05 % external solution    TEMOVATE    50 mL    Apply topically At Bedtime and rub into affected areas of scalp. Never to be used on face nor groin.    Lichen planopilaris       * clobetasol 0.05 % cream    TEMOVATE    60 g    Apply a thin layer to aa on hands for 1-2 weeks. Tapering with improvement and using during flares. Do not use on face or body folds    Contact dermatitis, unspecified contact dermatitis type, unspecified trigger       ferrous fumarate 65 mg ([place]. FE)-Vitamin C 125 mg  MG Tabs tablet    VITRON C    100 tablet    Take 1 tablet by mouth every morning (before breakfast) INDICATION: IRON SUPPLEMENT    Chronic fatigue, Iron deficiency anemia, unspecified iron deficiency anemia type       fluticasone 50 MCG/ACT spray    FLONASE    16 g    Spray 2 sprays in nostril At Bedtime INDICATION: TO CONTROL NASAL ALLERGY SYMPTOMS    Seasonal allergic rhinitis       HYDROcodone-acetaminophen 5-325 MG per tablet    NORCO    20 tablet    Take 1-2 tablets by mouth every 4 hours as needed for other (Moderate to Severe Pain)    Acute post-operative pain       * IBUPROFEN PO      Take 400 mg by mouth every 6 hours as needed        * ibuprofen 600 MG tablet    ADVIL/MOTRIN    30 tablet    Take 1 tablet (600 mg) by mouth every 6 hours as needed for pain (mild)    Acute post-operative pain       PRENATAL LOW IRON 27-1 MG Tabs     90 tablet    Take 1 tablet by mouth daily INDICATION: VITAMIN SUPPLEMENTATION    Iron deficiency anemia, unspecified iron deficiency anemia type, Menorrhagia with regular cycle       * Notice:  This list has 4 medication(s) that are the same as other medications prescribed for you. Read the directions carefully, and ask your doctor or other care provider to review them with you.

## 2018-09-25 ENCOUNTER — HEALTH MAINTENANCE LETTER (OUTPATIENT)
Age: 43
End: 2018-09-25

## 2018-10-11 DIAGNOSIS — Z12.31 ENCOUNTER FOR SCREENING MAMMOGRAM FOR BREAST CANCER: Primary | ICD-10-CM

## 2018-10-19 ENCOUNTER — HOSPITAL ENCOUNTER (OUTPATIENT)
Dept: MAMMOGRAPHY | Facility: CLINIC | Age: 43
Discharge: HOME | End: 2018-10-19
Attending: INTERNAL MEDICINE | Admitting: INTERNAL MEDICINE
Payer: OTHER GOVERNMENT

## 2018-10-19 ENCOUNTER — TRANSFERRED RECORDS (OUTPATIENT)
Dept: HEALTH INFORMATION MANAGEMENT | Facility: CLINIC | Age: 43
End: 2018-10-19

## 2018-10-19 DIAGNOSIS — Z12.31 ENCOUNTER FOR SCREENING MAMMOGRAM FOR BREAST CANCER: ICD-10-CM

## 2018-10-19 PROCEDURE — 77063 BREAST TOMOSYNTHESIS BI: CPT

## 2018-10-24 DIAGNOSIS — T45.4X5A ADVERSE EFFECT OF IRON: ICD-10-CM

## 2018-10-24 DIAGNOSIS — D50.9 ANEMIA, IRON DEFICIENCY: ICD-10-CM

## 2018-10-24 DIAGNOSIS — K90.9 INTESTINAL MALABSORPTION, UNSPECIFIED TYPE: ICD-10-CM

## 2018-10-30 ENCOUNTER — INFUSION THERAPY VISIT (OUTPATIENT)
Dept: INFUSION THERAPY | Facility: CLINIC | Age: 43
End: 2018-10-30
Attending: INTERNAL MEDICINE
Payer: OTHER GOVERNMENT

## 2018-10-30 VITALS
HEART RATE: 70 BPM | SYSTOLIC BLOOD PRESSURE: 111 MMHG | TEMPERATURE: 98 F | OXYGEN SATURATION: 99 % | DIASTOLIC BLOOD PRESSURE: 69 MMHG | RESPIRATION RATE: 16 BRPM

## 2018-10-30 DIAGNOSIS — D50.9 ANEMIA, IRON DEFICIENCY: Primary | ICD-10-CM

## 2018-10-30 DIAGNOSIS — T45.4X5A ADVERSE EFFECT OF IRON: ICD-10-CM

## 2018-10-30 DIAGNOSIS — K90.9 INTESTINAL MALABSORPTION, UNSPECIFIED TYPE: ICD-10-CM

## 2018-10-30 PROCEDURE — 25000128 H RX IP 250 OP 636: Performed by: INTERNAL MEDICINE

## 2018-10-30 PROCEDURE — 96365 THER/PROPH/DIAG IV INF INIT: CPT

## 2018-10-30 RX ADMIN — SODIUM CHLORIDE 250 ML: 9 INJECTION, SOLUTION INTRAVENOUS at 14:48

## 2018-10-30 RX ADMIN — FERRIC CARBOXYMALTOSE INJECTION 750 MG: 50 INJECTION, SOLUTION INTRAVENOUS at 14:48

## 2018-10-30 ASSESSMENT — PAIN SCALES - GENERAL: PAINLEVEL: NO PAIN (0)

## 2018-10-30 NOTE — PROGRESS NOTES
Infusion Nursing Note:  Melissa Painter presents today for Injectafer first dose.    Patient seen by provider today: No   present during visit today: Not Applicable.    Note: No concerns or changes to report.    Intravenous Access:  Peripheral IV placed.    Treatment Conditions:  Not Applicable.      Post Infusion Assessment:  Patient tolerated infusion without incident.  Patient observed for 30 minutes post Injectafer per protocol.  Blood return noted pre and post infusion.  Site patent and intact, free from redness, edema or discomfort.  No evidence of extravasations.  Access discontinued per protocol.    Discharge Plan:   Discharge instructions reviewed with: Patient.  Patient and/or family verbalized understanding of discharge instructions and all questions answered.  Copy of AVS reviewed with patient and/or family.  Patient will return 11/7 for next appointment.  Patient discharged in stable condition accompanied by: self.  Departure Mode: Ambulatory.    Emily French RN

## 2018-10-30 NOTE — MR AVS SNAPSHOT
After Visit Summary   10/30/2018    Melissa Painter    MRN: [id number]           Patient Information     Date Of Birth          1975        Visit Information        Provider Department      10/30/2018 2:30 PM  INFUSION CHAIR 8 [facility]        Today's Diagnoses     Anemia, iron deficiency    -  1    Intestinal malabsorption, unspecified type        Adverse effect of iron           Follow-ups after your visit        Your next 10 appointments already scheduled     Nov 07, 2018  2:30 PM CST   Level 1 with  INFUSION CHAIR 11   [facility] ([facility])    [facility]  [address]  [facility] [id number] [phone]              Who to contact     If you have questions or need follow up information about today's clinic visit or your schedule please contact [facility] directly at [phone].  Normal or non-critical lab and imaging results will be communicated to you by [employer], letter or phone within 4 business days after the clinic has received the results. If you do not hear from us within 7 days, please contact the clinic through [employer] or phone. If you have a critical or abnormal lab result, we will notify you by phone as soon as possible.  Submit refill requests through [employer] or call your pharmacy and they will forward the refill request to us. Please allow 3 business days for your refill to be completed.          Additional Information About Your Visit        MyChart Information     [employer] gives you secure access to your electronic health record. If you see a primary care provider, you can also send messages to your care team and make appointments. If you have questions, please call your primary care clinic.  If you do not have a primary care provider, please call [phone] and they will assist you.        Care EveryWhere ID     This is  your Care EveryWhere ID. This could be used by other organizations to access your [facility] medical records  [id number]        Your Vitals Were     Pulse Temperature Respirations Pulse Oximetry          70 98  F (36.7  C) (Oral) 16 99%         Blood Pressure from Last 3 Encounters:   10/30/18 111/69   09/12/18 110/78   08/31/18 112/68    Weight from Last 3 Encounters:   09/12/18 95.3 kg (210 lb)   08/31/18 96.8 kg (213 lb 8 oz)   07/23/18 97.1 kg (214 lb)              Today, you had the following     No orders found for display       Primary Care Provider Office Phone # Fax #    Kaci Francisco [phone]       [facility] [address] [address]        Equal Access to Services     [facility] : Hadii raleigh jean-baptiste hadasho [place], waaxda luqadaha, qaybta kaalmada nicho, prakash munoz . So [facility] [phone].    ATENCIÓN: Si habla español, tiene a edwards disposición servicios gratuitos de asistencia lingüística. Vel al [phone].    We comply with applicable federal civil rights laws and [place] laws. We do not discriminate on the basis of race, color, national origin, age, disability, sex, sexual orientation, or gender identity.            Thank you!     Thank you for choosing [facility]  for your care. Our goal is always to provide you with excellent care. Hearing back from our patients is one way we can continue to improve our services. Please take a few minutes to complete the written survey that you may receive in the mail after your visit with us. Thank you!             Your Updated Medication List - Protect others around you: Learn how to safely use, store and throw away your medicines at www.disposemymeds.org.          This list is accurate as of 10/30/18  3:39 PM.  Always use your most recent med list.                   Brand Name Dispense Instructions for use Diagnosis    CALCIUM 1000 + D 1000-800  MG-UNIT Tabs   Generic drug:  Calcium Carb-Cholecalciferol     100 tablet    TAKE ONE TABLET BY MOUTH EVERY DAY WITH FOOD FOR BONE HEALTH AND VITAMIN D SUPPLEMENTATION    Chronic fatigue, Vitamin D deficiency       cetirizine 10 MG tablet    zyrTEC    90 tablet    TAKE ONE TABLET BY MOUTH EVERY DAY TO TREAT NASAL ALLERGIES    Seasonal allergic rhinitis       cholecalciferol 5000 units Caps     90 capsule    Take 1 capsule (5,000 Units) by mouth daily INDICATION: LOW VITAMIN D, TAKE WITH FOOD    Chronic fatigue       * clobetasol 0.05 % external solution    TEMOVATE    50 mL    Apply topically At Bedtime and rub into affected areas of scalp. Never to be used on face nor groin.    Lichen planopilaris       * clobetasol 0.05 % cream    TEMOVATE    60 g    Apply a thin layer to aa on hands for 1-2 weeks. Tapering with improvement and using during flares. Do not use on face or body folds    Contact dermatitis, unspecified contact dermatitis type, unspecified trigger       ferrous fumarate 65 mg ([place]. FE)-Vitamin C 125 mg  MG Tabs tablet    VITRON C    100 tablet    Take 1 tablet by mouth every morning (before breakfast) INDICATION: IRON SUPPLEMENT    Chronic fatigue, Iron deficiency anemia, unspecified iron deficiency anemia type       fluticasone 50 MCG/ACT spray    FLONASE    16 g    Spray 2 sprays in nostril At Bedtime INDICATION: TO CONTROL NASAL ALLERGY SYMPTOMS    Seasonal allergic rhinitis       HYDROcodone-acetaminophen 5-325 MG per tablet    NORCO    20 tablet    Take 1-2 tablets by mouth every 4 hours as needed for other (Moderate to Severe Pain)    Acute post-operative pain       * IBUPROFEN PO      Take 400 mg by mouth every 6 hours as needed        * ibuprofen 600 MG tablet    ADVIL/MOTRIN    30 tablet    Take 1 tablet (600 mg) by mouth every 6 hours as needed for pain (mild)    Acute post-operative pain       PRENATAL LOW IRON 27-1 MG Tabs     90 tablet    Take 1 tablet by mouth daily INDICATION:  VITAMIN SUPPLEMENTATION    Iron deficiency anemia, unspecified iron deficiency anemia type, Menorrhagia with regular cycle       * Notice:  This list has 4 medication(s) that are the same as other medications prescribed for you. Read the directions carefully, and ask your doctor or other care provider to review them with you.

## 2018-11-01 NOTE — PROGRESS NOTES
Discharge Note    Progress reporting period is from initial eval to Jul 16, 2018.     Melissa failed to return for next follow up visit and current status is unknown.  Please see information below for last relevant information on current status.  Patient seen for Rxs Used: 1 visits.    SUBJECTIVE  Subjective changes noted by patient:  Subjective: See Eval.  Current pain level is Current Pain level: 3/10.     Previous pain level was  Initial Pain level: 3/10.   Changes in function:  Yes (See Goal flowsheet attached for changes in current functional level)  Adverse reaction to treatment or activity: None    OBJECTIVE  Changes noted in objective findings: Objective: See Eval.    ASSESSMENT/PLAN  Diagnosis: PFD/cystocele/dyspareunia   DIAGP:  The primary encounter diagnosis was Cystocele, midline. Diagnoses of Dyspareunia, female and High-tone pelvic floor dysfunction were also pertinent to this visit.  Updated problem list and treatment plan:     Decreased function - HEP    STG/LTGs have been met or progress has been made towards goals:  Yes, please see goal flowsheet for most current information.    Assessment of Progress: current status is unknown.  Last current status:  .  Self Management Plans:  HEP    I have re-evaluated this patient and find that the nature, scope, duration and intensity of the therapy is appropriate for the medical condition of the patient.  Melissa continues to require the following intervention to meet STG and LTG's:  HEP.    Recommendations:  Discharge with current home program.  Patient to follow up with MD as needed. Episode to be closed at this time and patient formally discharged from therapy.    Cecilia Perez, PT      Please refer to the daily flowsheet for treatment today, total treatment time and time spent performing 1:1 timed codes.

## 2018-11-07 ENCOUNTER — INFUSION THERAPY VISIT (OUTPATIENT)
Dept: INFUSION THERAPY | Facility: CLINIC | Age: 43
End: 2018-11-07
Attending: INTERNAL MEDICINE
Payer: OTHER GOVERNMENT

## 2018-11-07 VITALS
HEART RATE: 68 BPM | DIASTOLIC BLOOD PRESSURE: 72 MMHG | SYSTOLIC BLOOD PRESSURE: 105 MMHG | TEMPERATURE: 97.7 F | RESPIRATION RATE: 16 BRPM

## 2018-11-07 DIAGNOSIS — K90.9 INTESTINAL MALABSORPTION, UNSPECIFIED TYPE: Primary | ICD-10-CM

## 2018-11-07 DIAGNOSIS — T45.4X5A ADVERSE EFFECT OF IRON: ICD-10-CM

## 2018-11-07 PROCEDURE — 25000128 H RX IP 250 OP 636: Performed by: INTERNAL MEDICINE

## 2018-11-07 PROCEDURE — 96365 THER/PROPH/DIAG IV INF INIT: CPT

## 2018-11-07 RX ADMIN — SODIUM CHLORIDE 250 ML: 9 INJECTION, SOLUTION INTRAVENOUS at 14:57

## 2018-11-07 RX ADMIN — FERRIC CARBOXYMALTOSE INJECTION 750 MG: 50 INJECTION, SOLUTION INTRAVENOUS at 14:56

## 2018-11-07 ASSESSMENT — PAIN SCALES - GENERAL: PAINLEVEL: NO PAIN (0)

## 2018-11-07 NOTE — PROGRESS NOTES
Infusion Nursing Note:  Melissa Painter presents today for injectafer.    Patient seen by provider today: No   present during visit today: Not Applicable.    Note: N/A.    Intravenous Access:  Peripheral IV placed.    Treatment Conditions:  Not Applicable.      Post Infusion Assessment:  Patient tolerated infusion without incident.  Patient observed for 30 minutes post infusion per protocol.  Site patent and intact, free from redness, edema or discomfort.  No evidence of extravasations.  Access discontinued per protocol.    Discharge Plan:   Patient and/or family verbalized understanding of discharge instructions and all questions answered.  AVS to patient via [employer].  Patient discharged in stable condition accompanied by: self.  Departure Mode: Ambulatory.    Cleo Head RN

## 2018-11-07 NOTE — MR AVS SNAPSHOT
After Visit Summary   11/7/2018    Melissa Painter    MRN: [id number]           Patient Information     Date Of Birth          1975        Visit Information        Provider Department      11/7/2018 2:30 PM  INFUSION CHAIR [facility]        Today's Diagnoses     Intestinal malabsorption, unspecified type    -  1    Adverse effect of iron           Follow-ups after your visit        Who to contact     If you have questions or need follow up information about today's clinic visit or your schedule please contact [facility] AND [facility] directly at [phone].  Normal or non-critical lab and imaging results will be communicated to you by [employer], letter or phone within 4 business days after the clinic has received the results. If you do not hear from us within 7 days, please contact the clinic through [employer] or phone. If you have a critical or abnormal lab result, we will notify you by phone as soon as possible.  Submit refill requests through [employer] or call your pharmacy and they will forward the refill request to us. Please allow 3 business days for your refill to be completed.          Additional Information About Your Visit        MyChart Information     [employer] gives you secure access to your electronic health record. If you see a primary care provider, you can also send messages to your care team and make appointments. If you have questions, please call your primary care clinic.  If you do not have a primary care provider, please call [phone] and they will assist you.        Care EveryWhere ID     This is your Care EveryWhere ID. This could be used by other organizations to access your [facility] medical records  [id number]        Your Vitals Were     Pulse Temperature Respirations             68 97.7  F (36.5  C) (Oral) 16          Blood Pressure from Last 3 Encounters:   11/07/18 105/72   10/30/18 111/69   09/12/18 110/78     Weight from Last 3 Encounters:   09/12/18 95.3 kg (210 lb)   08/31/18 96.8 kg (213 lb 8 oz)   07/23/18 97.1 kg (214 lb)              Today, you had the following     No orders found for display       Primary Care Provider Office Phone # Fax #    Kaci Francisco [phone]       [facility] [address] [facility] [facility] [address] [facility] [address]  [facility] [address]        Equal Access to Services     [facility] : Hadii aad ku hadasho Soomaali, waaxda luqadaha, qaybta kaalmada adeegyada, waxay idiin hayaan adeeg wilner munoz . So [facility] [phone].    ATENCIÓN: Si habla español, tiene a edwards disposición servicios gratuitos de asistencia lingüística. [facility] [phone].    We comply with applicable federal civil rights laws and [place] laws. We do not discriminate on the basis of race, color, national origin, age, disability, sex, sexual orientation, or gender identity.            Thank you!     Thank you for choosing [facility] AND [facility]  for your care. Our goal is always to provide you with excellent care. Hearing back from our patients is one way we can continue to improve our services. Please take a few minutes to complete the written survey that you may receive in the mail after your visit with us. Thank you!             Your Updated Medication List - Protect others around you: Learn how to safely use, store and throw away your medicines at www.disposemymeds.org.          This list is accurate as of 11/7/18  3:47 PM.  Always use your most recent med list.                   Brand Name Dispense Instructions for use Diagnosis    CALCIUM 1000 + D 1000-800 MG-UNIT Tabs   Generic drug:  Calcium Carb-Cholecalciferol     100 tablet    TAKE ONE TABLET BY MOUTH EVERY DAY WITH FOOD FOR BONE HEALTH AND VITAMIN D SUPPLEMENTATION    Chronic fatigue, Vitamin D deficiency       cetirizine 10 MG tablet    zyrTEC    90 tablet    TAKE ONE TABLET BY MOUTH EVERY DAY TO TREAT NASAL ALLERGIES    Seasonal  allergic rhinitis       cholecalciferol 5000 units Caps     90 capsule    Take 1 capsule (5,000 Units) by mouth daily INDICATION: LOW VITAMIN D, TAKE WITH FOOD    Chronic fatigue       * clobetasol 0.05 % external solution    TEMOVATE    50 mL    Apply topically At Bedtime and rub into affected areas of scalp. Never to be used on face nor groin.    Lichen planopilaris       * clobetasol 0.05 % cream    TEMOVATE    60 g    Apply a thin layer to aa on hands for 1-2 weeks. Tapering with improvement and using during flares. Do not use on face or body folds    Contact dermatitis, unspecified contact dermatitis type, unspecified trigger       ferrous fumarate 65 mg ([place]. FE)-Vitamin C 125 mg  MG Tabs tablet    VITRON C    100 tablet    Take 1 tablet by mouth every morning (before breakfast) INDICATION: IRON SUPPLEMENT    Chronic fatigue, Iron deficiency anemia, unspecified iron deficiency anemia type       fluticasone 50 MCG/ACT spray    FLONASE    16 g    Spray 2 sprays in nostril At Bedtime INDICATION: TO CONTROL NASAL ALLERGY SYMPTOMS    Seasonal allergic rhinitis       HYDROcodone-acetaminophen 5-325 MG per tablet    NORCO    20 tablet    Take 1-2 tablets by mouth every 4 hours as needed for other (Moderate to Severe Pain)    Acute post-operative pain       * IBUPROFEN PO      Take 400 mg by mouth every 6 hours as needed        * ibuprofen 600 MG tablet    ADVIL/MOTRIN    30 tablet    Take 1 tablet (600 mg) by mouth every 6 hours as needed for pain (mild)    Acute post-operative pain       PRENATAL LOW IRON 27-1 MG Tabs     90 tablet    Take 1 tablet by mouth daily INDICATION: VITAMIN SUPPLEMENTATION    Iron deficiency anemia, unspecified iron deficiency anemia type, Menorrhagia with regular cycle       * Notice:  This list has 4 medication(s) that are the same as other medications prescribed for you. Read the directions carefully, and ask your doctor or other care provider to review them with you.

## 2019-04-22 ENCOUNTER — OFFICE VISIT (OUTPATIENT)
Dept: OBGYN | Facility: CLINIC | Age: 44
End: 2019-04-22
Payer: OTHER GOVERNMENT

## 2019-04-22 VITALS — WEIGHT: 204 LBS | BODY MASS INDEX: 32.43 KG/M2 | DIASTOLIC BLOOD PRESSURE: 72 MMHG | SYSTOLIC BLOOD PRESSURE: 116 MMHG

## 2019-04-22 DIAGNOSIS — N93.9 ABNORMAL UTERINE BLEEDING (AUB): Primary | ICD-10-CM

## 2019-04-22 DIAGNOSIS — N81.4 UTERINE PROLAPSE: ICD-10-CM

## 2019-04-22 DIAGNOSIS — N81.11 CYSTOCELE, MIDLINE: ICD-10-CM

## 2019-04-22 DIAGNOSIS — Z12.4 SCREENING FOR CERVICAL CANCER: ICD-10-CM

## 2019-04-22 PROCEDURE — 84146 ASSAY OF PROLACTIN: CPT | Performed by: OBSTETRICS & GYNECOLOGY

## 2019-04-22 PROCEDURE — 83001 ASSAY OF GONADOTROPIN (FSH): CPT | Performed by: OBSTETRICS & GYNECOLOGY

## 2019-04-22 PROCEDURE — 87624 HPV HI-RISK TYP POOLED RSLT: CPT | Performed by: OBSTETRICS & GYNECOLOGY

## 2019-04-22 PROCEDURE — G0145 SCR C/V CYTO,THINLAYER,RESCR: HCPCS | Performed by: OBSTETRICS & GYNECOLOGY

## 2019-04-22 PROCEDURE — 83520 IMMUNOASSAY QUANT NOS NONAB: CPT | Mod: 90 | Performed by: OBSTETRICS & GYNECOLOGY

## 2019-04-22 PROCEDURE — 99000 SPECIMEN HANDLING OFFICE-LAB: CPT | Performed by: OBSTETRICS & GYNECOLOGY

## 2019-04-22 PROCEDURE — 36415 COLL VENOUS BLD VENIPUNCTURE: CPT | Performed by: OBSTETRICS & GYNECOLOGY

## 2019-04-22 PROCEDURE — 84443 ASSAY THYROID STIM HORMONE: CPT | Performed by: OBSTETRICS & GYNECOLOGY

## 2019-04-22 PROCEDURE — 82670 ASSAY OF TOTAL ESTRADIOL: CPT | Performed by: OBSTETRICS & GYNECOLOGY

## 2019-04-22 PROCEDURE — 99214 OFFICE O/P EST MOD 30 MIN: CPT | Performed by: OBSTETRICS & GYNECOLOGY

## 2019-04-22 NOTE — PATIENT INSTRUCTIONS
Patient Education     Patient Education    Medroxyprogesterone Acetate Oral tablet    Medroxyprogesterone Acetate Suspension for injection [Contraception]    Medroxyprogesterone Acetate Suspension for injection [Malignancy]  Medroxyprogesterone Acetate Oral tablet  What is this medicine?  MEDROXYPROGESTERONE (me DROX ee proe [place] te altagracia) is a hormone in a class called progestins. It is commonly used to prevent the uterine lining from overgrowth in women taking an estrogen after menopause. It is also used to treat irregular menstrual bleeding or a lack of menstrual bleeding in women.  This medicine may be used for other purposes; ask your health care provider or pharmacist if you have questions.  What should I tell my health care provider before I take this medicine?  They need to know if you have any of these conditions:    blood vessel disease or a history of a blood clot in the lungs or legs    breast, cervical or vaginal cancer    heart disease    kidney disease    liver disease    migraine    recent miscarriage or     mental depression    migraine    seizures (convulsions)    stroke    vaginal bleeding that has not been evaluated    an unusual or allergic reaction to medroxyprogesterone, other medicines, foods, dyes, or preservatives    pregnant or trying to get pregnant    breast-feeding  How should I use this medicine?  Take this medicine by mouth with a glass of water. Follow the directions on the prescription label. Take your doses at regular intervals. Do not take your medicine more often than directed.  Talk to your pediatrician regarding the use of this medicine in children. Special care may be needed. While this drug may be prescribed for children as young as 13 years for selected conditions, precautions do apply.  Overdosage: If you think you have taken too much of this medicine contact a poison control center or emergency room at once.  NOTE: This medicine is only for you. Do not share this  medicine with others.  What if I miss a dose?  If you miss a dose, take it as soon as you can. If it is almost time for your next dose, take only that dose. Do not take double or extra doses.  What may interact with this medicine?    barbiturate medicines for inducing sleep or treating seizures (convulsions)    bosentan    carbamazepine    phenytoin    rifampin    [place]'s Wort  This list may not describe all possible interactions. Give your health care provider a list of all the medicines, herbs, non-prescription drugs, or dietary supplements you use. Also tell them if you smoke, drink alcohol, or use illegal drugs. Some items may interact with your medicine.  What should I watch for while using this medicine?  Visit your health care professional for regular checks on your progress. You will need a regular breast and pelvic exam.  If you have any reason to think you are pregnant, stop taking this medicine at once and contact your doctor or health care professional.  What side effects may I notice from receiving this medicine?  Side effects that you should report to your doctor or health care professional as soon as possible:    breast tenderness or discharge    changes in mood or emotions, such as depression    changes in vision or speech    pain in the abdomen, chest, groin, or leg    severe headache    skin rash, itching, or hives    sudden shortness of breath    unusually weak or tired    yellowing of skin or eyes  Side effects that usually do not require medical attention (report to your doctor or health care professional if they continue or are bothersome):    acne    change in menstrual bleeding pattern or flow    changes in sexual desire    facial hair growth    fluid retention and swelling    headache    upset stomach    weight gain or loss  This list may not describe all possible side effects. Call your doctor for medical advice about side effects. You may report side effects to FDA at  1-800-FDA-1088.  Where should I keep my medicine?  Keep out of the reach of children.  Store at room temperature between 20 and 25 degrees C (68 and 77 degrees F). Throw away any unused medicine after the expiration date.  NOTE:This sheet is a summary. It may not cover all possible information. If you have questions about this medicine, talk to your doctor, pharmacist, or health care provider. Copyright  2016 [employer]

## 2019-04-22 NOTE — PROGRESS NOTES
SUBJECTIVE:                                                   Melissa Painter is a 44 year old female who presents to clinic today for the following health issue(s):  Patient presents with:  Abnormal Uterine Bleeding: DUB, irregular menses      HPI:  Melissa presents with a recurrence of abnormal uterine bleeding. She is here with her . She had an Essure and for 4 motnhs prior to its removal by Dr. Mosqueda she experienced prolonged menses. She had a dilation and curettage and also had a bilateral salpingectomy in 2018. Right after that she had regular menses until the last two months. In the month of April she had menses for three weeks. She was told of a uterine fibroid in the past but on repeat sonogram it was resolved. She is not interested in definitive surgical management. She also has uterine prolapse and states that it is not interfering with her quality of life. She has tried pelvic floor PT in the past. She is not open to oral contraceptive pills. She had IM Depo Provera in the past and had a significant amount of weight gain. She has been having hot flushes and night sweats for the past 1.5 years.    Patient's last menstrual period was 2019..   Patient is sexually active, .  Using Essure for contraception.    reports that she has never smoked. She has never used smokeless tobacco.    STD testing offered?  Declined    Health maintenance updated:  yes    Today's PHQ-2 Score:   PHQ-2 (  [employer]) 2017   Q1: Little interest or pleasure in doing things 0   Q2: Feeling down, depressed or hopeless 0   PHQ-2 Score 0     Today's PHQ-9 Score:   PHQ-9 SCORE 2018   PHQ-9 Total Score 0     Today's REGINALDO-7 Score:   REGINALDO-7 SCORE 2018   Total Score 0       Problem list and histories reviewed & adjusted, as indicated.  Additional history: as documented.    Patient Active Problem List   Diagnosis     Iron deficiency anemia, unspecified iron deficiency     Chronic fatigue      Vitamin D deficiency     Hair loss     Immunity status testing     Excessive or frequent menstruation     CARDIOVASCULAR SCREENING; LDL GOAL LESS THAN 160     Seasonal allergic rhinitis     Rectocele     Cystocele, midline     Urgency-frequency syndrome     Cervicalgia     Acute bilateral low back pain without sciatica     Hepatitis B carrier (H)     Acute right-sided low back pain without sciatica     Dyspareunia, female     High-tone pelvic floor dysfunction     Anemia, iron deficiency     Intestinal malabsorption     Adverse effect of iron     Past Surgical History:   Procedure Laterality Date     AS HYSTEROS W PERMANENT FALLOPAIN IMPLANT  2013    ESSURE     HEAD & NECK SURGERY      WISDOM TEETH EXTRACTION     LAPAROSCOPIC SALPINGECTOMY Bilateral 8/31/2018    Procedure: LAPAROSCOPIC SALPINGECTOMY;  LAPAROSCOPIC BILATERAL SALPINGECTOMY FOR ESSURE REMOVAL LYSIS OF ADHESIONS AND DILATION AND CURETTAGE;  Surgeon: Patrick Mosqueda MD;  Location: [facility]      Social History     Tobacco Use     Smoking status: Never Smoker     Smokeless tobacco: Never Used   Substance Use Topics     Alcohol use: Yes     Alcohol/week: 0.0 oz     Comment: 2-3 glasses wine/yr      Problem (# of Occurrences) Relation (Name,Age of Onset)    Hypertension (1) Mother            Current Outpatient Medications   Medication Sig     CALCIUM 1000 + D 1000-800 MG-UNIT TABS TAKE ONE TABLET BY MOUTH EVERY DAY WITH FOOD FOR BONE HEALTH AND VITAMIN D SUPPLEMENTATION     cetirizine (ZYRTEC) 10 MG tablet TAKE ONE TABLET BY MOUTH EVERY DAY TO TREAT NASAL ALLERGIES     cholecalciferol 5000 UNITS CAPS Take 1 capsule (5,000 Units) by mouth daily INDICATION: LOW VITAMIN D, TAKE WITH FOOD     clobetasol (TEMOVATE) 0.05 % cream Apply a thin layer to aa on hands for 1-2 weeks. Tapering with improvement and using during flares. Do not use on face or body folds     clobetasol (TEMOVATE) 0.05 % external solution Apply topically At Bedtime and rub into affected  areas of scalp. Never to be used on face nor groin.     ferrous fumarate 65 mg, [place]. FE,-Vitamin C 125 mg (VITRON C)  MG TABS tablet Take 1 tablet by mouth every morning (before breakfast) INDICATION: IRON SUPPLEMENT     fluticasone (FLONASE) 50 MCG/ACT nasal spray Spray 2 sprays in nostril At Bedtime INDICATION: TO CONTROL NASAL ALLERGY SYMPTOMS     HYDROcodone-acetaminophen (NORCO) 5-325 MG per tablet Take 1-2 tablets by mouth every 4 hours as needed for other (Moderate to Severe Pain)     ibuprofen (ADVIL/MOTRIN) 600 MG tablet Take 1 tablet (600 mg) by mouth every 6 hours as needed for pain (mild)     IBUPROFEN PO Take 400 mg by mouth every 6 hours as needed      Prenatal Vit-Fe Fumarate-FA (PRENATAL LOW IRON) 27-1 MG TABS Take 1 tablet by mouth daily INDICATION: VITAMIN SUPPLEMENTATION     No current facility-administered medications for this visit.      Allergies   Allergen Reactions     Levaquin [Levofloxacin] Itching     Influenza Vaccine Live        ROS:  12 point review of systems negative other than symptoms noted below.  Constitutional: Fatigue and Weight Gain  Gastrointestinal: Abdominal Pain, Bloating, Constipation and Nausea  Genitourinary: Cramps, Heavy Bleeding with Period, Hot Flashes, Night Sweats and Painful [place]  Skin: Acne  Musculoskeletal: Joint Pain and Muscle Cramps  Endocrine: Excess Hair Growth  Psychiatric: Anxiety and [place]    OBJECTIVE:     /72   Wt 92.5 kg (204 lb)   LMP 04/02/2019   BMI 32.43 kg/m    Body mass index is 32.43 kg/m .    Exam:  Constitutional:  Appearance: Well nourished, well developed alert, in no acute distress  Chest:  Respiratory Effort:  Breathing unlabored  Gastrointestinal:  Abdominal Examination:  Abdomen nontender to palpation, tone normal without rigidity or guarding, no masses present, umbilicus without lesions; Liver/Spleen:  No hepatomegaly present, liver nontender to palpation; Hernias:  No hernias present  Skin:General Inspection:   No rashes present, no lesions present, no areas of discoloration; Genitalia and Groin:  No rashes present, no lesions present, no areas of discoloration, no masses present.  Neurologic/Psychiatric:  Mental Status:  Oriented X3   Pelvic Exam:  External Genitalia:     Normal appearance for age, no discharge present, no tenderness present, no inflammatory lesions present, color normal  Vagina:     Normal vaginal vault without central or paravaginal defects, no discharge present, no inflammatory lesions present, no masses present  Bladder:     Nontender to palpation, cystocele to the hymenal ring on valsalva  Urethra:   Urethral Body:  Urethra palpation normal, urethra structural support normal   Urethral Meatus:  No erythema or lesions present  Cervix:     Appearance healthy, no lesions present, nontender to palpation, no bleeding present. On valsalva cervix descends to 1 cm behind the hymenal ring.  Uterus:     Uterus: firm, normal sized and nontender, retroverted in position.   Adnexa:     No adnexal tenderness present, no adnexal masses present  Perineum:     Perineum within normal limits, no evidence of trauma, no rashes or skin lesions present  Pubic Hair:     Normal pubic hair distribution for age  Genitalia and Groin:     No rashes present, no lesions present, no areas of discoloration, no masses present       In-Clinic Test Results:  No results found for this or any previous visit (from the past 24 hour(s)).    ASSESSMENT/PLAN:                                                        ICD-10-CM    1. Abnormal uterine bleeding (AUB) N93.9 TSH with free T4 reflex     Follicle stimulating hormone     Estradiol     Prolactin     Anti-Mullerian hormone     US Pelvic Complete w Transvaginal   2. Screening for cervical cancer Z12.4 Pap imaged thin layer diagnostic with HPV (select HPV order below)     HPV High Risk Types DNA Cervical   3. Uterine prolapse N81.4    4. Cystocele, midline N81.11      AUB is likely due to  anovulatory dysfunction based on her age and on her history. However given her history of leiomyoma, a pelvic sonogram was recommended. We will decide on the need for an endometrial biopsy based on her ultrasound results. We will also rule out secondary causes of anovulation and check her ovarian reserve.  I discussed symptomatic management with an IUD or provera as she is not interested in OCPs. She is not a good candidate for endometrial ablation as her menses are irregular and it is better suited for patients with menorrhagia.    Pap smear screening was performed today.    State 3 uterine prolapse and stage 3 cystocele. Melissa is not currently interested in surgical management. She will think about restarting pelvic floor PT.  Patient Instructions   Patient Education     Patient Education    Medroxyprogesterone Acetate Oral tablet    Medroxyprogesterone Acetate Suspension for injection [Contraception]    Medroxyprogesterone Acetate Suspension for injection [Malignancy]  Medroxyprogesterone Acetate Oral tablet  What is this medicine?  MEDROXYPROGESTERONE (me DROX ee proe ISRA[place] te altagracia) is a hormone in a class called progestins. It is commonly used to prevent the uterine lining from overgrowth in women taking an estrogen after menopause. It is also used to treat irregular menstrual bleeding or a lack of menstrual bleeding in women.  This medicine may be used for other purposes; ask your health care provider or pharmacist if you have questions.  What should I tell my health care provider before I take this medicine?  They need to know if you have any of these conditions:    blood vessel disease or a history of a blood clot in the lungs or legs    breast, cervical or vaginal cancer    heart disease    kidney disease    liver disease    migraine    recent miscarriage or     mental depression    migraine    seizures (convulsions)    stroke    vaginal bleeding that has not been evaluated    an unusual or  allergic reaction to medroxyprogesterone, other medicines, foods, dyes, or preservatives    pregnant or trying to get pregnant    breast-feeding  How should I use this medicine?  Take this medicine by mouth with a glass of water. Follow the directions on the prescription label. Take your doses at regular intervals. Do not take your medicine more often than directed.  Talk to your pediatrician regarding the use of this medicine in children. Special care may be needed. While this drug may be prescribed for children as young as 13 years for selected conditions, precautions do apply.  Overdosage: If you think you have taken too much of this medicine contact a poison control center or emergency room at once.  NOTE: This medicine is only for you. Do not share this medicine with others.  What if I miss a dose?  If you miss a dose, take it as soon as you can. If it is almost time for your next dose, take only that dose. Do not take double or extra doses.  What may interact with this medicine?    barbiturate medicines for inducing sleep or treating seizures (convulsions)    bosentan    carbamazepine    phenytoin    rifampin    Marcy's Wort  This list may not describe all possible interactions. Give your health care provider a list of all the medicines, herbs, non-prescription drugs, or dietary supplements you use. Also tell them if you smoke, drink alcohol, or use illegal drugs. Some items may interact with your medicine.  What should I watch for while using this medicine?  Visit your health care professional for regular checks on your progress. You will need a regular breast and pelvic exam.  If you have any reason to think you are pregnant, stop taking this medicine at once and contact your doctor or health care professional.  What side effects may I notice from receiving this medicine?  Side effects that you should report to your doctor or health care professional as soon as possible:    breast tenderness or  discharge    changes in mood or emotions, such as depression    changes in vision or speech    pain in the abdomen, chest, groin, or leg    severe headache    skin rash, itching, or hives    sudden shortness of breath    unusually weak or tired    yellowing of skin or eyes  Side effects that usually do not require medical attention (report to your doctor or health care professional if they continue or are bothersome):    acne    change in menstrual bleeding pattern or flow    changes in sexual desire    facial hair growth    fluid retention and swelling    headache    upset stomach    weight gain or loss  This list may not describe all possible side effects. Call your doctor for medical advice about side effects. You may report side effects to FDA at 4-280-VKS-0990.  Where should I keep my medicine?  Keep out of the reach of children.  Store at room temperature between 20 and 25 degrees C (68 and 77 degrees F). Throw away any unused medicine after the expiration date.  NOTE:This sheet is a summary. It may not cover all possible information. If you have questions about this medicine, talk to your doctor, pharmacist, or health care provider. Copyright  2016 [employer]                 Noelle Rodriguez MD  [facility]

## 2019-04-23 LAB
ESTRADIOL SERPL-MCNC: 50 PG/ML
FSH SERPL-ACNC: 40.7 IU/L
PROLACTIN SERPL-MCNC: 12 UG/L (ref 3–27)
TSH SERPL DL<=0.005 MIU/L-ACNC: 0.93 MU/L (ref 0.4–4)

## 2019-04-24 LAB — MIS SERPL-MCNC: 0.06 NG/ML

## 2019-04-26 LAB
COPATH REPORT: NORMAL
PAP: NORMAL

## 2019-04-29 LAB
FINAL DIAGNOSIS: NORMAL
HPV HR 12 DNA CVX QL NAA+PROBE: NEGATIVE
HPV16 DNA SPEC QL NAA+PROBE: NEGATIVE
HPV18 DNA SPEC QL NAA+PROBE: NEGATIVE
SPECIMEN DESCRIPTION: NORMAL
SPECIMEN SOURCE CVX/VAG CYTO: NORMAL

## 2020-03-10 ENCOUNTER — HEALTH MAINTENANCE LETTER (OUTPATIENT)
Age: 45
End: 2020-03-10

## 2020-08-04 DIAGNOSIS — Z12.31 ENCOUNTER FOR SCREENING MAMMOGRAM FOR BREAST CANCER: Primary | ICD-10-CM

## 2020-08-14 ENCOUNTER — HOSPITAL ENCOUNTER (OUTPATIENT)
Dept: MAMMOGRAPHY | Facility: CLINIC | Age: 45
Discharge: HOME | End: 2020-08-14
Attending: INTERNAL MEDICINE | Admitting: INTERNAL MEDICINE
Payer: OTHER GOVERNMENT

## 2020-08-14 DIAGNOSIS — Z12.31 ENCOUNTER FOR SCREENING MAMMOGRAM FOR BREAST CANCER: ICD-10-CM

## 2020-08-14 PROCEDURE — 77063 BREAST TOMOSYNTHESIS BI: CPT

## 2020-12-27 ENCOUNTER — HEALTH MAINTENANCE LETTER (OUTPATIENT)
Age: 45
End: 2020-12-27

## 2021-04-24 ENCOUNTER — HEALTH MAINTENANCE LETTER (OUTPATIENT)
Age: 46
End: 2021-04-24

## 2021-07-01 ENCOUNTER — IMMUNIZATION (OUTPATIENT)
Dept: NURSING | Facility: CLINIC | Age: 46
End: 2021-07-01
Payer: OTHER GOVERNMENT

## 2021-07-01 PROCEDURE — 91300 PR COVID VAC PFIZER DIL RECON 30 MCG/0.3 ML IM: CPT

## 2021-07-01 PROCEDURE — 0001A PR COVID VAC PFIZER DIL RECON 30 MCG/0.3 ML IM: CPT
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
## 2021-07-22 ENCOUNTER — IMMUNIZATION (OUTPATIENT)
Dept: NURSING | Facility: CLINIC | Age: 46
End: 2021-07-22
Attending: INTERNAL MEDICINE
Payer: OTHER GOVERNMENT

## 2021-07-22 PROCEDURE — 0002A PR COVID VAC PFIZER DIL RECON 30 MCG/0.3 ML IM: CPT

## 2021-07-22 PROCEDURE — 91300 PR COVID VAC PFIZER DIL RECON 30 MCG/0.3 ML IM: CPT

## 2021-10-04 ENCOUNTER — HEALTH MAINTENANCE LETTER (OUTPATIENT)
Age: 46
End: 2021-10-04

## 2021-10-25 ENCOUNTER — LAB REQUISITION (OUTPATIENT)
Dept: LAB | Facility: CLINIC | Age: 46
End: 2021-10-25

## 2021-10-25 PROCEDURE — 86481 TB AG RESPONSE T-CELL SUSP: CPT | Performed by: INTERNAL MEDICINE

## 2021-10-26 LAB
GAMMA INTERFERON BACKGROUND BLD IA-ACNC: 0.27 IU/ML
M TB IFN-G BLD-IMP: NEGATIVE
M TB IFN-G CD4+ BCKGRND COR BLD-ACNC: 9.73 IU/ML
MITOGEN IGNF BCKGRD COR BLD-ACNC: -0.01 IU/ML
MITOGEN IGNF BCKGRD COR BLD-ACNC: 0.15 IU/ML
QUANTIFERON MITOGEN: 10 IU/ML
QUANTIFERON NIL TUBE: 0.27 IU/ML
QUANTIFERON TB1 TUBE: 0.42 IU/ML
QUANTIFERON TB2 TUBE: 0.26

## 2021-11-28 ENCOUNTER — HEALTH MAINTENANCE LETTER (OUTPATIENT)
Age: 46
End: 2021-11-28

## 2021-12-08 NOTE — TELEPHONE ENCOUNTER
Type of surgery: LSC BS for ESSURE RMVL  Location of surgery: [facility] OR  Date and time of surgery: 7/31/2018 7:30am 5:30am  Surgeon: Jaylin  Pre-Op Appt Date: 7/23/2018  Post-Op Appt Date: TBD   Packet sent out: 7/24/2018 MAILED  Pre-cert/Authorization completed:  TBD  Date: 7/24/2018 Caden ventura/Galina Gomez  Surgery Scheduler        Order Questions      Question Answer Comment     Procedure name(s) - multi select laparoscopy with bilateral Essure removal and bilateral salpingectomy      Reason for procedure pelvic pain secondary to Essure      Is this a multi surgeon case? No      Laterality Bilateral      Request for additional equipment Other (see comments) None     Anesthesia General      Initiate Pre-op orders for above procedure: Yes, as ordered in Epic Additional orders noted there also     Location of Case: [facility] OR      Urgency of Surgery: Routine      Surgeon Procedure Time (incision to closure) in minutes (per procedure as applicable) 60      Note:  Surgical Case Time Needed (in minutes)     Patient Class (for admit prior to surgery, specify number of days in comments): Same day (hospital outpatient)      Why can t this outpatient surgery be done at the [facility] na      H&P To Be Completed By: Already Done      Where is the note? In [employer] Note      Vendor Needed? No         [Initial Evaluation] : an initial evaluation of [FreeTextEntry2] : palps, vertigo and chest pain

## 2022-02-14 ENCOUNTER — TRANSFERRED RECORDS (OUTPATIENT)
Dept: HEALTH INFORMATION MANAGEMENT | Facility: CLINIC | Age: 47
End: 2022-02-14

## 2022-02-23 ENCOUNTER — HOSPITAL ENCOUNTER (OUTPATIENT)
Dept: MAMMOGRAPHY | Facility: CLINIC | Age: 47
Discharge: HOME | End: 2022-02-23
Attending: INTERNAL MEDICINE | Admitting: INTERNAL MEDICINE
Payer: OTHER GOVERNMENT

## 2022-02-23 DIAGNOSIS — Z12.31 VISIT FOR SCREENING MAMMOGRAM: ICD-10-CM

## 2022-02-23 PROCEDURE — 77067 SCR MAMMO BI INCL CAD: CPT

## 2022-05-15 ENCOUNTER — HEALTH MAINTENANCE LETTER (OUTPATIENT)
Age: 47
End: 2022-05-15

## 2022-09-11 ENCOUNTER — HEALTH MAINTENANCE LETTER (OUTPATIENT)
Age: 47
End: 2022-09-11

## 2023-01-03 NOTE — MR AVS SNAPSHOT
After Visit Summary   6/8/2017    Melissa Painter    MRN: [id number]           Patient Information     Date Of Birth          1975        Visit Information        Provider Department      6/8/2017 2:00 PM Kaci Francisco MD [facility]        Today's Diagnoses     Vitamin D deficiency    -  1    Chronic fatigue        Iron deficiency anemia, unspecified iron deficiency anemia type        Menorrhagia with regular cycle          Care Instructions    ** FOLLOW UP PLAN**:    PLEASE SCHEDULE LABS WITH OFFICE VISIT 2 MONTHS FROM TODAY TO FOLLOW UP ON      Chronic fatigue  Vitamin D deficiency  Iron deficiency anemia, unspecified iron deficiency anemia type  Menorrhagia with regular cycle     BE SURE TO SCHEDULE YOUR LAB DRAW APPOINTMENT FOR AT LEAST 5 DAYS BEFORE YOUR NEXT VISIT    YOU MAY CONTACT THE CLINIC IF ANY QUESTIONS OR CONCERNS -[phone] OR VIA [employer]                     Follow-ups after your visit        Future tests that were ordered for you today     Open Future Orders        Priority Expected Expires Ordered    Ferritin Routine 6/8/2017 12/6/2017 6/8/2017    Iron and iron binding capacity Routine 6/8/2017 12/6/2017 6/8/2017    Vitamin D Deficiency Routine 6/8/2017 12/6/2017 6/8/2017    CBC with platelets Routine 6/8/2017 12/6/2017 6/8/2017            Who to contact     If you have questions or need follow up information about today's clinic visit or your schedule please contact [facility] directly at [phone].  Normal or non-critical lab and imaging results will be communicated to you by MyChart, letter or phone within 4 business days after the clinic has received the results. If you do not hear from us within 7 days, please contact the clinic through [employer] or phone. If you have a critical or abnormal lab result, we will notify you by phone as soon as possible.  Submit refill requests through [employer] or call your pharmacy and  they will forward the refill request to us. Please allow 3 business days for your refill to be completed.          Additional Information About Your Visit        [employer] Information     [employer] gives you secure access to your electronic health record. If you see a primary care provider, you can also send messages to your care team and make appointments. If you have questions, please call your primary care clinic.  If you do not have a primary care provider, please call [phone] and they will assist you.        Care EveryWhere ID     This is your Care EveryWhere ID. This could be used by other organizations to access your [place] medical records  [id number]        Your Vitals Were     Pulse Temperature Respirations Last Period Pulse Oximetry BMI (Body Mass Index)    68 97.8  F (36.6  C) (Oral) 16 05/09/2017 100% 35.88 kg/m2       Blood Pressure from Last 3 Encounters:   06/08/17 120/78   06/01/17 110/82   05/30/17 110/84    Weight from Last 3 Encounters:   06/08/17 222 lb 4.8 oz (100.8 kg)   06/01/17 224 lb (101.6 kg)   05/30/17 224 lb 11.2 oz (101.9 kg)                 Today's Medication Changes          These changes are accurate as of: 6/8/17  2:53 PM.  If you have any questions, ask your nurse or doctor.               Start taking these medicines.        Dose/Directions    PRENATAL LOW IRON 27-1 MG Tabs   Used for:  Iron deficiency anemia, unspecified iron deficiency anemia type, Menorrhagia with regular cycle   Started by:  Kaci Francisco MD        Dose:  1 tablet   Take 1 tablet by mouth daily INDICATION: VITAMIN SUPPLEMENTATION   Quantity:  90 tablet   Refills:  prn         These medicines have changed or have updated prescriptions.        Dose/Directions    * cholecalciferol 5000 UNITS Caps   This may have changed:  Another medication with the same name was added. Make sure you understand how and when to take each.   Used for:  Chronic fatigue   Changed by:  Kaci Francisco MD        Dose:  1  capsule   Take 1 capsule (5,000 Units) by mouth daily INDICATION: LOW VITAMIN D, TAKE WITH FOOD   Quantity:  90 capsule   Refills:  3       * cholecalciferol 42468 UNITS capsule   Commonly known as:  VITAMIN D3   This may have changed:  You were already taking a medication with the same name, and this prescription was added. Make sure you understand how and when to take each.   Used for:  Vitamin D deficiency, Chronic fatigue   Changed by:  Kaci Francisco MD        Dose:  1 capsule   Take 1 capsule (50,000 Units) by mouth twice a week for 5 doses INDICATION: 2 WEEK BOOSTER DOSE TO CORRECT LOW VITAMIN D   Quantity:  5 capsule   Refills:  0       * Notice:  This list has 2 medication(s) that are the same as other medications prescribed for you. Read the directions carefully, and ask your doctor or other care provider to review them with you.         Where to get your medicines      These medications were sent to [address] [address]     Phone:  [phone]     cholecalciferol 5000 UNITS Caps    cholecalciferol 67292 UNITS capsule    ferrous fumarate 65 mg ([place]. FE)-Vitamin C 125 mg  MG Tabs tablet         Some of these will need a paper prescription and others can be bought over the counter.  Ask your nurse if you have questions.     Bring a paper prescription for each of these medications     PRENATAL LOW IRON 27-1 MG Tabs                Primary Care Provider Office Phone # Fax #    Kaci Francisco [phone]       [address] [address]        Thank you!     Thank you for choosing [facility]  for your care. Our goal is always to provide you with excellent care. Hearing back from our patients is one way we can continue to improve our services. Please take a few minutes to complete the written survey that you may receive in the mail after your  visit with us. Thank you!             Your Updated Medication List - Protect others around you: Learn how to safely use, store and throw away your medicines at www.disposemymeds.org.          This list is accurate as of: 6/8/17  2:53 PM.  Always use your most recent med list.                   Brand Name Dispense Instructions for use    Calcium Carb-Cholecalciferol 1000-800 MG-UNIT Tabs    CALCIUM 1000 + D    100 tablet    Take 1 tablet by mouth daily TAKE WITH FOOD, FOR BONE HEALTH AND FOR VITAMIN D SUPPLEMENTATION       cetirizine 10 MG tablet    zyrTEC    90 tablet    TAKE ONE TABLET BY MOUTH EVERY DAY TO TREAT NASAL ALLERGIES       * cholecalciferol 5000 UNITS Caps     90 capsule    Take 1 capsule (5,000 Units) by mouth daily INDICATION: LOW VITAMIN D, TAKE WITH FOOD       * cholecalciferol 11827 UNITS capsule    VITAMIN D3    5 capsule    Take 1 capsule (50,000 Units) by mouth twice a week for 5 doses INDICATION: 2 WEEK BOOSTER DOSE TO CORRECT LOW VITAMIN D       ferrous fumarate 65 mg ([place]. FE)-Vitamin C 125 mg  MG Tabs tablet    VITRON C    100 tablet    Take 1 tablet by mouth daily INDICATION: IRON SUPPLEMENT       fluticasone 50 MCG/ACT spray    FLONASE    16 g    Spray 2 sprays in nostril At Bedtime INDICATION: TO CONTROL NASAL ALLERGY SYMPTOMS       IBUPROFEN PO          PRENATAL LOW IRON 27-1 MG Tabs     90 tablet    Take 1 tablet by mouth daily INDICATION: VITAMIN SUPPLEMENTATION       * Notice:  This list has 2 medication(s) that are the same as other medications prescribed for you. Read the directions carefully, and ask your doctor or other care provider to review them with you.       O-Z Flap Text: The defect edges were debeveled with a #15 scalpel blade.  Given the location of the defect, shape of the defect and the proximity to free margins an O-Z flap was deemed most appropriate.  Using a sterile surgical marker, an appropriate transposition flap was drawn incorporating the defect and placing the expected incisions within the relaxed skin tension lines where possible. The area thus outlined was incised deep to adipose tissue with a #15 scalpel blade.  The skin margins were undermined to an appropriate distance in all directions utilizing iris scissors.

## 2023-04-11 ENCOUNTER — TRANSFERRED RECORDS (OUTPATIENT)
Dept: HEALTH INFORMATION MANAGEMENT | Facility: CLINIC | Age: 48
End: 2023-04-11

## 2023-04-11 ENCOUNTER — HOSPITAL ENCOUNTER (OUTPATIENT)
Dept: MAMMOGRAPHY | Facility: CLINIC | Age: 48
Discharge: HOME | End: 2023-04-11
Attending: INTERNAL MEDICINE | Admitting: INTERNAL MEDICINE
Payer: OTHER GOVERNMENT

## 2023-04-11 DIAGNOSIS — Z12.31 ENCOUNTER FOR SCREENING MAMMOGRAM FOR MALIGNANT NEOPLASM OF BREAST: ICD-10-CM

## 2023-04-11 PROCEDURE — 77067 SCR MAMMO BI INCL CAD: CPT

## 2023-06-03 ENCOUNTER — HEALTH MAINTENANCE LETTER (OUTPATIENT)
Age: 48
End: 2023-06-03

## 2023-06-12 ENCOUNTER — HOSPITAL ENCOUNTER (OUTPATIENT)
Dept: GENERAL RADIOLOGY | Facility: CLINIC | Age: 48
Discharge: HOME | End: 2023-06-12
Attending: INTERNAL MEDICINE | Admitting: INTERNAL MEDICINE
Payer: OTHER GOVERNMENT

## 2023-06-12 DIAGNOSIS — M25.562 LEFT KNEE PAIN: ICD-10-CM

## 2023-06-12 DIAGNOSIS — M25.462 KNEE EFFUSION, LEFT: ICD-10-CM

## 2023-06-12 PROCEDURE — 73560 X-RAY EXAM OF KNEE 1 OR 2: CPT | Mod: LT

## 2023-06-16 ENCOUNTER — TRANSFERRED RECORDS (OUTPATIENT)
Dept: HEALTH INFORMATION MANAGEMENT | Facility: CLINIC | Age: 48
End: 2023-06-16

## 2023-12-11 ASSESSMENT — ENCOUNTER SYMPTOMS
HEMATURIA: 0
BREAST MASS: 0
WEAKNESS: 0
CHILLS: 0
DIARRHEA: 0
CONSTIPATION: 0
NAUSEA: 0
MYALGIAS: 0
FEVER: 0
HEMATOCHEZIA: 0
COUGH: 0
SORE THROAT: 0
FREQUENCY: 0
SHORTNESS OF BREATH: 0
ABDOMINAL PAIN: 0
PARESTHESIAS: 0
HEADACHES: 0
ARTHRALGIAS: 1
DIZZINESS: 0
DYSURIA: 0
EYE PAIN: 0
JOINT SWELLING: 1
NERVOUS/ANXIOUS: 0
HEARTBURN: 0
PALPITATIONS: 0

## 2023-12-13 NOTE — PROGRESS NOTES
Melissa is a 48 year old  female who presents for annual exam.     Besides routine health maintenance, she has no other health concerns today .    HPI:  The patient's PCP is Dr. Kaci Francisco MD.  Melissa presents for an annual exam. She has been having hot flushes and her periods have been getting more spaced out. She has been working with Dr. Francisco with weight loss and lost almost 40 pounds from her heaviest.       GYNECOLOGIC HISTORY:    Patient's last menstrual period was 10/15/2023 (within weeks).    Regular menses? no  Menses every 6 months.  Length of menses: 2-5 days    Her current contraception method is: tubal ligation.  She  reports that she has never smoked. She has never used smokeless tobacco.    Patient is sexually active.  STD testing offered?  Declined    Last PHQ-9 score on record =       2023     9:45 AM   PHQ-9 SCORE   PHQ-9 Total Score 5     Last GAD7 score on record =       2023     9:45 AM   REGINALDO-7 SCORE   Total Score 0     Alcohol Score = 0    HEALTH MAINTENANCE:    Overdue       Never done ADVANCE CARE PLANNING (Every 5 Years)     Never done COLORECTAL CANCER SCREENING (COLONOSCOPY - Preferred) (Every 10 Years)     Never done HIV SCREENING (Once)     Never done LIPID (Every 5 Years)     2023 DTAP/TDAP/TD IMMUNIZATION (2 - Td or Tdap)  Last completed: 2013     SEP 1   2023 INFLUENZA VACCINE (1)  Last completed: 2022     SEP 1   2023 COVID-19 Vaccine (3 - 2023-24 season)  Last completed: 2021        Due Soon       2024 HPV TEST (Once)  Last completed:  PAP (Every 5 Years)  Last completed: 2019        Upcoming       2024 MAMMO SCREENING (Yearly)  Last completed: 2023       Health maintenance updated:  yes    HISTORY:  OB History    Para Term  AB Living   6 5 5 0 1 4   SAB IAB Ectopic Multiple Live Births   1 0 0 0 1      # Outcome Date GA Lbr Varun/2nd Weight  Sex Delivery Anes PTL Lv   6 Term            5 SAB            4 Term            3 Term            2 Term            1 Term         ND       Patient Active Problem List   Diagnosis    Iron deficiency anemia, unspecified iron deficiency    Chronic fatigue    Vitamin D deficiency    Hair loss    Immunity status testing    Excessive or frequent menstruation    CARDIOVASCULAR SCREENING; LDL GOAL LESS THAN 160    Seasonal allergic rhinitis    Rectocele    Cystocele, midline    Urgency-frequency syndrome    Cervicalgia    Acute bilateral low back pain without sciatica    Hepatitis B carrier (H)    Acute right-sided low back pain without sciatica    Dyspareunia, female    High-tone pelvic floor dysfunction    Anemia, iron deficiency    Intestinal malabsorption    Adverse effect of iron     Past Surgical History:   Procedure Laterality Date    AS HYSTEROS W PERMANENT FALLOPAIN IMPLANT  2013    ESSURE    HEAD & NECK SURGERY      WISDOM TEETH EXTRACTION    LAPAROSCOPIC SALPINGECTOMY Bilateral 8/31/2018    Procedure: LAPAROSCOPIC SALPINGECTOMY;  LAPAROSCOPIC BILATERAL SALPINGECTOMY FOR ESSURE REMOVAL LYSIS OF ADHESIONS AND DILATION AND CURETTAGE;  Surgeon: Patrick Mosqueda MD;  Location: [facility]      Social History     Tobacco Use    Smoking status: Never    Smokeless tobacco: Never   Substance Use Topics    Alcohol use: Yes     Alcohol/week: 0.0 standard drinks of alcohol     Comment: 2-3 glasses wine/yr      Problem (# of Occurrences) Relation (Name,Age of Onset)    Hypertension (1) Mother              Current Outpatient Medications   Medication Sig    buPROPion (WELLBUTRIN XL) 300 MG 24 hr tablet     CALCIUM 1000 + D 1000-800 MG-UNIT TABS TAKE ONE TABLET BY MOUTH EVERY DAY WITH FOOD FOR BONE HEALTH AND VITAMIN D SUPPLEMENTATION    cetirizine (ZYRTEC) 10 MG tablet TAKE ONE TABLET BY MOUTH EVERY DAY TO TREAT NASAL ALLERGIES    cholecalciferol 5000 UNITS CAPS Take 1 capsule (5,000 Units) by mouth daily INDICATION: LOW  "VITAMIN D, TAKE WITH FOOD    cyanocobalamin (VITAMIN B-12) 250 mcg half-tab     fluticasone (FLONASE) 50 MCG/ACT nasal spray Spray 2 sprays in nostril At Bedtime INDICATION: TO CONTROL NASAL ALLERGY SYMPTOMS    folic acid (FOLVITE) 1 MG tablet     IBUPROFEN PO Take 400 mg by mouth every 6 hours as needed     Misc Natural Products (ADV TURMERIC CURCUMIN COMPLEX) CAPS     Omega-3 Fatty Acids (FISH OIL DOUBLE STRENGTH) 1200 MG CAPS     OZEMPIC, 2 MG/DOSE, 8 MG/3ML pen     zinc 50 MG TABS      No current facility-administered medications for this visit.     Allergies   Allergen Reactions    Levaquin [Levofloxacin] Itching    Chloroquine Itching    Influenza Vaccine Live        Past medical, surgical, social and family histories were reviewed and updated in EPIC.    ROS:   12 point review of systems negative other than symptoms noted below or in the HPI.  No urinary frequency or dysuria, bladder or kidney problems    EXAM:  /78   Ht 1.683 m (5' 6.25\")   Wt 82.6 kg (182 lb)   LMP 10/15/2023 (Within Weeks)   BMI 29.15 kg/m     BMI: Body mass index is 29.15 kg/m .    PHYSICAL EXAM:  Constitutional:   Appearance: Well nourished, well developed, alert, in no acute distress  Neck:  Lymph Nodes:  No lymphadenopathy present    Thyroid:  Gland size normal, nontender, no nodules or masses present  on palpation  Chest:  Respiratory Effort:  Breathing unlabored  Cardiovascular:    Heart: Auscultation:  Regular rate, normal rhythm, no murmurs present  Breasts: Inspection of Breasts:  No lymphadenopathy present., Palpation of Breasts and Axillae:  No masses present on palpation, no breast tenderness., Axillary Lymph Nodes:  No lymphadenopathy present., and No nodularity, asymmetry or nipple discharge bilaterally.  Gastrointestinal:   Abdominal Examination:  Abdomen nontender to palpation, tone normal without rigidity or guarding, no masses present, umbilicus without lesions   Liver and Spleen:  No hepatomegaly present, liver " nontender to palpation    Hernias:  No hernias present  Lymphatic: Lymph Nodes:  No other lymphadenopathy present  Skin:  General Inspection:  No rashes present, no lesions present, no areas of  discoloration  Neurologic:    Mental Status:  Oriented X3.  Normal strength and tone, sensory exam                grossly normal, mentation intact and speech normal.    Psychiatric:   Mentation appears normal and affect normal/bright.         Pelvic Exam:  External Genitalia:     Normal appearance for age, no discharge present, no tenderness present, no inflammatory lesions present, color normal  Vagina:     Normal vaginal vault without central or paravaginal defects, no discharge present, no inflammatory lesions present, no masses present  Bladder:     Nontender to palpation  Urethra:   Urethral Body:  Urethra palpation normal, urethra structural support normal   Urethral Meatus:  No erythema or lesions present  Cervix:     Appearance healthy, no lesions present, nontender to palpation, no bleeding present  Uterus:     Uterus: firm, normal sized and nontender, retroverted in position.   Adnexa:     No adnexal tenderness present, no adnexal masses present  Perineum:     Perineum within normal limits, no evidence of trauma, no rashes or skin lesions present  Anus:     Anus within normal limits, no hemorrhoids present  Inguinal Lymph Nodes:     No lymphadenopathy present  Pubic Hair:     Normal pubic hair distribution for age  Genitalia and Groin:     No rashes present, no lesions present, no areas of discoloration, no masses present    COUNSELING:   Reviewed preventive health counseling, as reflected in patient instructions    BMI: Body mass index is 29.15 kg/m .  Weight management plan: Patient was referred to their PCP to discuss a diet and exercise plan.    ASSESSMENT:  48 year old female with satisfactory annual exam.    ICD-10-CM    1. Encounter for gynecological examination without abnormal finding  Z01.419 Pap screen  with HPV - recommended age 30 - 65 years      2. Abnormal uterine bleeding  N93.9 Follicle stimulating hormone     Estradiol     Follicle stimulating hormone     Estradiol          PLAN:  Normal breast and pelvic exam  Recommend getting an FSH/estradiol level to understand her intermittent menses. Discussed that if she experiences heavy bleeding when she does get her menses I would recommend a pelvic ultrasound and endometrial biopsy at that time.     Noelle Rodriguez MD    Answers submitted by the patient for this visit:  Annual Preventive Visit (Submitted on 12/11/2023)  Chief Complaint: Annual Exam:  Frequency of exercise:: 6-7 days/week  Getting at least 3 servings of Calcium per day:: Yes  Diet:: Low salt, Low fat/cholesterol, Carbohydrate counting, Gluten-free/reduced  Taking medications regularly:: Yes  Medication side effects:: None  Bi-annual eye exam:: Yes  Dental care twice a year:: Yes  Sleep apnea or symptoms of sleep apnea:: None  abdominal pain: No  Blood in stool: No  Blood in urine: No  chest pain: No  chills: No  congestion: No  constipation: No  cough: No  diarrhea: No  dizziness: No  ear pain: No  eye pain: No  nervous/anxious: No  fever: No  frequency: No  genital sores: No  headaches: No  hearing loss: No  heartburn: No  arthralgias: Yes  joint swelling: Yes  peripheral edema: Yes  mood changes: No  myalgias: No  nausea: No  dysuria: No  palpitations: No  Skin sensation changes: No  sore throat: No  urgency: No  rash: No  shortness of breath: No  visual disturbance: No  weakness: No  pelvic pain: No  vaginal bleeding: No  vaginal discharge: No  tenderness: No  breast mass: No  breast discharge: No  Exercise outside of work (Submitted on 12/11/2023)  Chief Complaint: Annual Exam:  Duration of exercise:: 30-45 minutes

## 2023-12-14 ENCOUNTER — OFFICE VISIT (OUTPATIENT)
Dept: OBGYN | Facility: CLINIC | Age: 48
End: 2023-12-14
Payer: OTHER GOVERNMENT

## 2023-12-14 VITALS
BODY MASS INDEX: 29.25 KG/M2 | HEIGHT: 66 IN | DIASTOLIC BLOOD PRESSURE: 78 MMHG | SYSTOLIC BLOOD PRESSURE: 102 MMHG | WEIGHT: 182 LBS

## 2023-12-14 DIAGNOSIS — N93.9 ABNORMAL UTERINE BLEEDING: ICD-10-CM

## 2023-12-14 DIAGNOSIS — Z01.419 ENCOUNTER FOR GYNECOLOGICAL EXAMINATION WITHOUT ABNORMAL FINDING: Primary | ICD-10-CM

## 2023-12-14 PROCEDURE — G0145 SCR C/V CYTO,THINLAYER,RESCR: HCPCS | Performed by: OBSTETRICS & GYNECOLOGY

## 2023-12-14 PROCEDURE — 36415 COLL VENOUS BLD VENIPUNCTURE: CPT | Performed by: OBSTETRICS & GYNECOLOGY

## 2023-12-14 PROCEDURE — 87624 HPV HI-RISK TYP POOLED RSLT: CPT | Performed by: OBSTETRICS & GYNECOLOGY

## 2023-12-14 PROCEDURE — 83001 ASSAY OF GONADOTROPIN (FSH): CPT | Performed by: OBSTETRICS & GYNECOLOGY

## 2023-12-14 PROCEDURE — 99386 PREV VISIT NEW AGE 40-64: CPT | Performed by: OBSTETRICS & GYNECOLOGY

## 2023-12-14 PROCEDURE — 82670 ASSAY OF TOTAL ESTRADIOL: CPT | Performed by: OBSTETRICS & GYNECOLOGY

## 2023-12-14 RX ORDER — GINSENG 100 MG
CAPSULE ORAL
COMMUNITY
Start: 2023-06-20

## 2023-12-14 RX ORDER — BUPROPION HYDROCHLORIDE 300 MG/1
TABLET ORAL
COMMUNITY
Start: 2023-04-03

## 2023-12-14 RX ORDER — SEMAGLUTIDE 2.68 MG/ML
INJECTION, SOLUTION SUBCUTANEOUS
COMMUNITY
Start: 2023-08-14 | End: 2024-08-29

## 2023-12-14 RX ORDER — FOLIC ACID 1 MG/1
TABLET ORAL
COMMUNITY
Start: 2022-02-25

## 2023-12-14 RX ORDER — PHENYLEPHRINE HCL 10 MG
TABLET ORAL
COMMUNITY
Start: 2023-06-16

## 2023-12-14 RX ORDER — CYANOCOBALAMIN (VITAMIN B-12) 500 MCG
TABLET ORAL
COMMUNITY
Start: 2023-02-01

## 2023-12-14 ASSESSMENT — ANXIETY QUESTIONNAIRES
GAD7 TOTAL SCORE: 0
2. NOT BEING ABLE TO STOP OR CONTROL WORRYING: NOT AT ALL
7. FEELING AFRAID AS IF SOMETHING AWFUL MIGHT HAPPEN: NOT AT ALL
GAD7 TOTAL SCORE: 0
3. WORRYING TOO MUCH ABOUT DIFFERENT THINGS: NOT AT ALL
6. BECOMING EASILY ANNOYED OR IRRITABLE: NOT AT ALL
1. FEELING NERVOUS, ANXIOUS, OR ON EDGE: NOT AT ALL
5. BEING SO RESTLESS THAT IT IS HARD TO SIT STILL: NOT AT ALL

## 2023-12-14 ASSESSMENT — PATIENT HEALTH QUESTIONNAIRE - PHQ9
SUM OF ALL RESPONSES TO PHQ QUESTIONS 1-9: 5
5. POOR APPETITE OR OVEREATING: NOT AT ALL

## 2023-12-15 LAB
ESTRADIOL SERPL-MCNC: <5 PG/ML
FSH SERPL IRP2-ACNC: 86.7 MIU/ML

## 2023-12-15 NOTE — PROGRESS NOTES
[facility] - PRIMARY CARE SKIN    CC : Hair loss   SUBJECTIVE:                                                    Melissa Painter is a 42 year old female who presents to clinic today because of hair loss on the scalp.    Episodes of painful bumps on the scalp first occurred 4 years ago, recurring without any noticeable pattern every 4-6 months. A bald spot has been present for 3+ years at site of itchy bump.    Onset : 4 years ago.  Symptoms include : Scalp itchiness and bumps. Itchiness waxes and wanes. She reports pus discharge after scratching at bumps on the scalp.    She denies any other areas of involvement on the face nor arms.    Tension hair styles : NO.  Excessive weight loss : NO.  Recent serious medical illness : NO.    Products used : Oils used on scalp. Tea tree oil on scalp.    Personal Medical History  Skin Cancer : NO  Eczema Psoriasis Rosacea Autoimmune   NO NO NO NO     Family Medical History  Connective tissue disease : NO  Thyroid disease : NO  Hair Loss : NO  Skin Cancer : NO  Eczema Psoriasis Rosacea Autoimmune   ?YES  NO NO NO     Occupation : CNA in a hospital (indoor).    Patient Active Problem List   Diagnosis     Iron deficiency anemia, unspecified iron deficiency     Chronic fatigue     Vitamin D deficiency     Hair loss     Immunity status testing     Excessive or frequent menstruation     CARDIOVASCULAR SCREENING; LDL GOAL LESS THAN 160     Seasonal allergic rhinitis     Rectocele     Cystocele, midline     Urgency-frequency syndrome     Cervicalgia     Acute bilateral low back pain without sciatica     Hepatitis B carrier (H)     Acute right-sided low back pain without sciatica       Past Medical History:   Diagnosis Date     Anemia      Chronic fatigue 4/7/2016     Urgency-frequency syndrome     Past Surgical History:   Procedure Laterality Date     AS HYSTEROS W PERMANENT FALLOPAIN IMPLANT  2013    ESSURE     wisdom teeth        Social History   Substance Use Topics     Smoking  status: Never Smoker     Smokeless tobacco: Never Used     Alcohol use 0.0 oz/week     0 Standard drinks or equivalent per week      Comment: 2-3 glasses wine/yr    Family History     Problem (# of Occurrences) Relation (Name,Age of Onset)    Hypertension (1) Mother           Prescription Medications as of 2/20/2018             diclofenac (VOLTAREN) 75 MG EC tablet Take 1 tablet (75 mg) by mouth 2 times daily as needed for moderate pain    CALCIUM 1000 + D 1000-800 MG-UNIT TABS TAKE ONE TABLET BY MOUTH EVERY DAY WITH FOOD FOR BONE HEALTH AND VITAMIN D SUPPLEMENTATION    Prenatal Vit-Fe Fumarate-FA (PRENATAL LOW IRON) 27-1 MG TABS Take 1 tablet by mouth daily INDICATION: VITAMIN SUPPLEMENTATION    ferrous fumarate 65 mg, Kake. FE,-Vitamin C 125 mg (VITRON C)  MG TABS tablet Take 1 tablet by mouth every morning (before breakfast) INDICATION: IRON SUPPLEMENT    cholecalciferol 5000 UNITS CAPS Take 1 capsule (5,000 Units) by mouth daily INDICATION: LOW VITAMIN D, TAKE WITH FOOD    fluticasone (FLONASE) 50 MCG/ACT nasal spray Spray 2 sprays in nostril At Bedtime INDICATION: TO CONTROL NASAL ALLERGY SYMPTOMS    cetirizine (ZYRTEC) 10 MG tablet TAKE ONE TABLET BY MOUTH EVERY DAY TO TREAT NASAL ALLERGIES    IBUPROFEN PO             Allergies   Allergen Reactions     Levaquin [Levofloxacin] Itching     Influenza Vaccine Live         INTEGUMENTARY/SKIN: POSITIVE for hair loss  ROS : 14 point review of systems was negative except the symptoms listed above in the HPI.    This document serves as a record of the services and decisions personally performed and made by Norma Jacobson MD. It was created on her behalf by Greg Marie, a trained medical scribe.  The creation of this document is based on the scribe's personal observations and the provider's statements to the medical scribe.  Greg Marie, February 20, 2018 3:04 PM      OBJECTIVE:                                                    GENERAL: healthy, alert and no  distress  SKIN: Siddiqi Skin Type - VI.  Scalp and Face were examined. The dermatoscope was used to help evaluate pigmented lesions.  Skin Pertinent Findings:  Pattern of loss : patchy.    Crown of scalp : 10 cm x 4 cm area of alopecia. Some scattered patches of hair within the area of alopecia. Some scarring of tissue. Some induration. No erythema.     Left parietal scalp : 4 cm x 3 cm patch of hair loss.    Diagnostic Test Results:  No results found for this or any previous visit (from the past 24 hour(s)).  Labs Pending.    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      ASSESSMENT:                                                      Encounter Diagnosis   Name Primary?     Hair loss Yes         PLAN:                                                    Patient Instructions   FUTURE APPOINTMENTS  Follow up in 7-10 day(s) for Suture Removal and Tissue Pathology with Dr. Jacobson.    SCALP POST-TREATMENT CARE INSTRUCTIONS  Do not go swimming, until the wound is healed.    However, showering is encouraged. The next time you shower, remove the dressing and clean the area with soap and water. Neither soap, water nor shampoo will hurt the surgical area. Dry the area well with a towel or hairdryer and then apply a small amount of Vaseline over the wound. Then, cover again with a new dressing.    Signs of Infection:  Infection can occur in any area where skin has been disrupted.  If you notice persistent redness, swelling, colored drainage, increasing pain, fever or other signs of infection, please call us at: [phone] and ask to have me or my colleague paged. We will call you back to discuss.    Pathology Results:  You will be notified, generally via letter or MyChart, in approximately 10 days. If there is anything we need to discuss or further treatment needed, I will call you to discuss it.        PROCEDURES:                                                    Name : Punch  biopsy  Indication : Submission to pathology for evaluation of tissue.  Location(s) : crown of scalp.  Completed by : Norma Jacobson MD  Photo Taken : no.  Anesthesia : Patient was anesthetized by infiltrating the area surrounding the lesion with 1% lidocaine.   epinephrine [id number] : Yes.  Buffered with bicarbonate : Yes.  Note : Discussed the risk of pain, infection, scarring, hypo- or hyperpigmentation and recurrence or need for re-treatment. The benefits of treatment and alternative treatments were also discussed.    During this procedure, the universal protocol was utilized. The patient's identity was confirmed by no less than two patient identifiers, correct procedure was verified, correct site was verified and marked as applicable and a final pause was completed.    Sterile technique was used throughout the procedure. The skin was cleaned and prepped with surgical cleanser. Once adequate anesthesia was obtained, two 4 mm punch tools were used with appropriate skin traction. The specimen was sent to pathology.    Direct pressure  was applied for hemostasis. The skin was closed with 3-0 Prolene interrupted simple stitch. No bleeding was present upon the completion of the procedure. The wound was coated with antibacterial ointment. A dry sterile dressing was applied.  Total number of stitches in closure of epidermis : 2, 3    Primary provider and referring provider will be informed regarding the wound culture and tissue sample report when it returns.    Suture removal : 7-10 days.      The information in this document, created by the medical scribe for me, accurately reflects the services I personally performed and the decisions made by me. I have reviewed and approved this document for accuracy prior to leaving the patient care area.  Norma Jacobson MD February 20, 2018 3:04 PM  [facility] [place]

## 2023-12-19 LAB
BKR LAB AP GYN ADEQUACY: NORMAL
BKR LAB AP GYN INTERPRETATION: NORMAL
BKR LAB AP HPV REFLEX: NORMAL
BKR LAB AP LMP: NORMAL
BKR LAB AP PREVIOUS ABNORMAL: NORMAL
PATH REPORT.COMMENTS IMP SPEC: NORMAL
PATH REPORT.COMMENTS IMP SPEC: NORMAL
PATH REPORT.RELEVANT HX SPEC: NORMAL

## 2023-12-20 LAB
HUMAN PAPILLOMA VIRUS 16 DNA: NEGATIVE
HUMAN PAPILLOMA VIRUS 18 DNA: NEGATIVE
HUMAN PAPILLOMA VIRUS FINAL DIAGNOSIS: NORMAL
HUMAN PAPILLOMA VIRUS OTHER HR: NEGATIVE

## 2024-02-20 ENCOUNTER — TRANSFERRED RECORDS (OUTPATIENT)
Dept: HEALTH INFORMATION MANAGEMENT | Facility: CLINIC | Age: 49
End: 2024-02-20

## 2024-04-04 ENCOUNTER — TRANSCRIBE ORDERS (OUTPATIENT)
Dept: OTHER | Age: 49
End: 2024-04-04

## 2024-04-04 ENCOUNTER — TRANSFERRED RECORDS (OUTPATIENT)
Dept: HEALTH INFORMATION MANAGEMENT | Facility: CLINIC | Age: 49
End: 2024-04-04

## 2024-04-04 ENCOUNTER — PATIENT OUTREACH (OUTPATIENT)
Dept: ONCOLOGY | Facility: CLINIC | Age: 49
End: 2024-04-04
Payer: OTHER GOVERNMENT

## 2024-04-04 DIAGNOSIS — D72.819 LEUKOPENIA: ICD-10-CM

## 2024-04-04 DIAGNOSIS — D64.9 ANEMIA: Primary | ICD-10-CM

## 2024-04-04 DIAGNOSIS — M25.50 JOINT PAIN: ICD-10-CM

## 2024-04-04 NOTE — PROGRESS NOTES
Hematology referral reviewed for [facility], see below.    Referral reason: referral received from [facility], labs and visit notes available in [facility], most not showing in chart review tabs. Referral to Dr Alvarado.    Referral received via: Fax referral from [facility]    [facility]: [facility]    Outreach: Call not placed to patient regarding referral.    Plan: Triage instructions updated and sent to NPS for completion.

## 2024-06-13 ENCOUNTER — HOSPITAL ENCOUNTER (OUTPATIENT)
Dept: MAMMOGRAPHY | Facility: CLINIC | Age: 49
Discharge: HOME | End: 2024-06-13
Attending: INTERNAL MEDICINE | Admitting: INTERNAL MEDICINE
Payer: OTHER GOVERNMENT

## 2024-06-13 DIAGNOSIS — Z12.31 ENCOUNTER FOR SCREENING MAMMOGRAM FOR MALIGNANT NEOPLASM OF BREAST: ICD-10-CM

## 2024-06-13 PROCEDURE — 77063 BREAST TOMOSYNTHESIS BI: CPT

## 2024-06-28 NOTE — TELEPHONE ENCOUNTER
RECORDS STATUS - ALL OTHER DIAGNOSIS      Action June 28, 2024 1:24 PM    Action Taken - Req is faxed to referring doctor for records and labs.     RECORDS RECEIVED FROM: [facility]   NOTES STATUS DETAILS   OFFICE NOTE from referring provider [facility] 05/23/24Dr. Kaci Francisco   MEDICATION LIST [facility]    LABS     ANYTHING RELATED TO DIAGNOSIS [facility] Most recent 05/03/24

## 2024-07-08 ENCOUNTER — PRE VISIT (OUTPATIENT)
Dept: ONCOLOGY | Facility: CLINIC | Age: 49
End: 2024-07-08
Payer: OTHER GOVERNMENT

## 2024-07-08 ENCOUNTER — VIRTUAL VISIT (OUTPATIENT)
Dept: ONCOLOGY | Facility: CLINIC | Age: 49
End: 2024-07-08
Attending: INTERNAL MEDICINE
Payer: OTHER GOVERNMENT

## 2024-07-08 VITALS — HEIGHT: 66 IN | WEIGHT: 170 LBS | BODY MASS INDEX: 27.32 KG/M2

## 2024-07-08 DIAGNOSIS — D70.9 NEUTROPENIA, UNSPECIFIED TYPE (H): ICD-10-CM

## 2024-07-08 DIAGNOSIS — D64.9 ANEMIA, UNSPECIFIED TYPE: Primary | ICD-10-CM

## 2024-07-08 PROCEDURE — 99204 OFFICE O/P NEW MOD 45 MIN: CPT | Mod: 95 | Performed by: INTERNAL MEDICINE

## 2024-07-08 ASSESSMENT — PAIN SCALES - GENERAL: PAINLEVEL: MILD PAIN (3)

## 2024-07-08 NOTE — LETTER
"7/8/2024      Melissa Painter  [address]  [place] [address]      Dear Colleague,    Thank you for referring your patient, Melissa Painter, to the [facility]. Please see a copy of my visit note below.    Virtual Visit Details    Type of service:  Video Visit     Originating Location (pt. Location): {video visit patient location:847204::\"Home\"}  {PROVIDER LOCATION On-site should be selected for visits conducted from your clinic location or adjoining [facility] hospital, academic office, or other nearby [facility] building. Off-site should be selected for all other provider locations, including home:058809}  Distant Location (provider location):  {virtual location provider:585958}  Platform used for Video Visit: {Virtual Visit Platforms:007275::\"UniYu\"}    [facility]    Hematology/Oncology New Patient Note      Today's Date: 07/08/24    Reason for Consult: Anemia and Leukopenia      HISTORY OF PRESENT ILLNESS: Melissa Painter is a 49 year old female who presents with further evaluation  leukopenia and anemia.     Sometimes last year menstruation slowed down, 3-4 months no menstruation . This year has period not heavy . Regular 4 more days and then stop. Couple of months no night sweats.  Lost about 60 lbs on Wegovy FH of no cancers . Fh of low counts, patient has had it for a long time.   Last infusion  iron 2018. Doesn't get many infections.  She had recent labs done which showed a hemoglobin of 11.5 in March 2024 iron saturation normal at 55% B12 levels normal ferritin normal.  White blood cell count was at 2.3.  Her menstruation is irregular and not heavy.  She had a previous history of heavy menstruation iron deficiency and last iron infusion again was in 2018.        REVIEW OF SYSTEMS:   14 point ROS was reviewed and is negative other than as noted above in HPI.       HOME MEDICATIONS:  Current Outpatient Medications   Medication Sig Dispense Refill     buPROPion " (WELLBUTRIN XL) 300 MG 24 hr tablet        CALCIUM 1000 + D 1000-800 MG-UNIT TABS TAKE ONE TABLET BY MOUTH EVERY DAY WITH FOOD FOR BONE HEALTH AND VITAMIN D SUPPLEMENTATION 100 tablet 3     cetirizine (ZYRTEC) 10 MG tablet TAKE ONE TABLET BY MOUTH EVERY DAY TO TREAT NASAL ALLERGIES 90 tablet 2     cholecalciferol 5000 UNITS CAPS Take 1 capsule (5,000 Units) by mouth daily INDICATION: LOW VITAMIN D, TAKE WITH FOOD 90 capsule 3     cyanocobalamin (VITAMIN B-12) 250 mcg half-tab        fluticasone (FLONASE) 50 MCG/ACT nasal spray Spray 2 sprays in nostril At Bedtime INDICATION: TO CONTROL NASAL ALLERGY SYMPTOMS 16 g PRN     folic acid (FOLVITE) 1 MG tablet        IBUPROFEN PO Take 400 mg by mouth every 6 hours as needed        Misc Natural Products (ADV TURMERIC CURCUMIN COMPLEX) CAPS        Omega-3 Fatty Acids (FISH OIL DOUBLE STRENGTH) 1200 MG CAPS        OZEMPIC, 2 MG/DOSE, 8 MG/3ML pen        zinc 50 MG TABS            ALLERGIES:  Allergies   Allergen Reactions     Levaquin [Levofloxacin] Itching     Chloroquine Itching     Influenza Vaccine Live          PAST MEDICAL HISTORY:  Past Medical History:   Diagnosis Date     Anemia      Chronic fatigue 4/7/2016     HA (headache)      Obese      Urgency-frequency syndrome          PAST SURGICAL HISTORY:  Past Surgical History:   Procedure Laterality Date     AS HYSTEROS W PERMANENT FALLOPAIN IMPLANT  2013    ESSURE     HEAD & NECK SURGERY      WISDOM TEETH EXTRACTION     LAPAROSCOPIC SALPINGECTOMY Bilateral 8/31/2018    Procedure: LAPAROSCOPIC SALPINGECTOMY;  LAPAROSCOPIC BILATERAL SALPINGECTOMY FOR ESSURE REMOVAL LYSIS OF ADHESIONS AND DILATION AND CURETTAGE;  Surgeon: Patrick Mosqueda MD;  Location: [facility]         SOCIAL HISTORY:  Social History     Socioeconomic History     Marital status:      Spouse name: Not on file     Number of children: Not on file     Years of education: Not on file     Highest education level: Not on file   Occupational History      "Occupation: CNA   Tobacco Use     Smoking status: Never     Smokeless tobacco: Never   Substance and Sexual Activity     Alcohol use: Yes     Alcohol/week: 0.0 standard drinks of alcohol     Comment: 2-3 glasses wine/yr     Drug use: No     Sexual activity: Yes     Partners: Male     Birth control/protection: Female Surgical     Comment: tubal ligation   Other Topics Concern     Parent/sibling w/ CABG, MI or angioplasty before 65F 55M? Not Asked   Social History Narrative     Not on file     Social Determinants of Health     Financial Resource Strain: Not on file   Food Insecurity: Not on file   Transportation Needs: Not on file   Physical Activity: Not on file   Stress: Not on file   Social Connections: Not on file   Interpersonal Safety: Not on file   Housing Stability: Not on file     Nurse full time     FAMILY HISTORY:  Family History   Problem Relation Age of Onset     Hypertension Mother          PHYSICAL EXAM:  Vital signs:  Ht 1.676 m (5' 6\")   Wt 77.1 kg (170 lb)   BMI 27.44 kg/m     ECO  GENERAL/CONSTITUTIONAL: No acute distress. Healthy, alert.  EYES: No scleral icterus.  No redness or discharge.    RESPIRATORY: No audible wheeze, cough, or visible cyanosis.  No visible retractions or increased work of breathing.  Able to speak fully in complete sentences.  MUSCULOSKELETAL: Normal range of motion.  NEUROLOGIC: Alert, oriented, answers questions appropriately. No tremor. Mentation intact and speech normal  INTEGUMENTARY: No jaundice.  No obvious rash or skin lesions.  PSYCHIATRIC:  Mentation appears normal, affect normal/bright, judgement and insight intact, normal speech and appearance well-groomed.    The rest of a comprehensive physical exam is deferred due to public health emergency video visit restrictions.       LABS:  CBC RESULTS:   Recent Labs   Lab Test 18  1339 18  1619   WBC  --  4.3   RBC  --  4.79   HGB 11.6* 11.7   HCT  --  35.3   MCV  --  74*   MCH  --  24.4*   MCHC  --  " 33.1   RDW  --  16.9*   PLT  --  332       Recent Labs   Lab Test 02/20/18  1555 05/30/17  1118    139   POTASSIUM 3.7 4.0   CHLORIDE 102 106   CO2 24 25   ANIONGAP 8 8   GLC 89 89   BUN 9 14   CR 0.71 0.71   SCAR 9.2 8.7         PATHOLOGY:  none    IMAGING:      ASSESSMENT/PLAN:  Melissa Painter is a 49 year old female who is here for further evaluation of anemia previous hemoglobin 12.8 in December 2023 most recently down to 11.5      I had a discussion with the patient that she appears to have anemia of chronic disease etiology unclear.  I would like to obtain a peripheral smear, repeat CBC differential and SPEP.  I will touch base with her after is resulted.  If those testings are normal we will send her back to her primary care physician.  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  Will check an [place]ASIA    1.  Anemia: Workup as above  2.  Leukopenia: Workup as above       I will touch base with her after is resulted to see whether we need to do a future follow-up      Bahman Alvarado MD  Hematology/Oncology  [facility]       Again, thank you for allowing me to participate in the care of your patient.        Sincerely,        Bahman Alvarado MD

## 2024-07-08 NOTE — PROGRESS NOTES
[facility]    Hematology/Oncology New Patient Note      Today's Date: 07/08/24    Reason for Consult: Anemia and Leukopenia      HISTORY OF PRESENT ILLNESS: Melissa Painter is a 49 year old female who presents with further evaluation  leukopenia and anemia.     Sometimes last year menstruation slowed down, 3-4 months no menstruation . This year has period not heavy . Regular 4 more days and then stop. Couple of months no night sweats.  Lost about 60 lbs on Wegovy FH of no cancers . Fh of low counts, patient has had it for a long time.   Last infusion  iron 2018. Doesn't get many infections.  She had recent labs done which showed a hemoglobin of 11.5 in March 2024 iron saturation normal at 55% B12 levels normal ferritin normal.  White blood cell count was at 2.3.  Her menstruation is irregular and not heavy.  She had a previous history of heavy menstruation iron deficiency and last iron infusion again was in 2018.        REVIEW OF SYSTEMS:   14 point ROS was reviewed and is negative other than as noted above in HPI.       HOME MEDICATIONS:  Current Outpatient Medications   Medication Sig Dispense Refill    buPROPion (WELLBUTRIN XL) 300 MG 24 hr tablet       CALCIUM 1000 + D 1000-800 MG-UNIT TABS TAKE ONE TABLET BY MOUTH EVERY DAY WITH FOOD FOR BONE HEALTH AND VITAMIN D SUPPLEMENTATION 100 tablet 3    cetirizine (ZYRTEC) 10 MG tablet TAKE ONE TABLET BY MOUTH EVERY DAY TO TREAT NASAL ALLERGIES 90 tablet 2    cholecalciferol 5000 UNITS CAPS Take 1 capsule (5,000 Units) by mouth daily INDICATION: LOW VITAMIN D, TAKE WITH FOOD 90 capsule 3    cyanocobalamin (VITAMIN B-12) 250 mcg half-tab       fluticasone (FLONASE) 50 MCG/ACT nasal spray Spray 2 sprays in nostril At Bedtime INDICATION: TO CONTROL NASAL ALLERGY SYMPTOMS 16 g PRN    folic acid (FOLVITE) 1 MG tablet       IBUPROFEN PO Take 400 mg by mouth every 6 hours as needed       Misc Natural Products (ADV TURMERIC CURCUMIN COMPLEX) CAPS        Omega-3 Fatty Acids (FISH OIL DOUBLE STRENGTH) 1200 MG CAPS       OZEMPIC, 2 MG/DOSE, 8 MG/3ML pen       zinc 50 MG TABS            ALLERGIES:  Allergies   Allergen Reactions    Levaquin [Levofloxacin] Itching    Chloroquine Itching    Influenza Vaccine Live          PAST MEDICAL HISTORY:  Past Medical History:   Diagnosis Date    Anemia     Chronic fatigue 4/7/2016    HA (headache)     Obese     Urgency-frequency syndrome          PAST SURGICAL HISTORY:  Past Surgical History:   Procedure Laterality Date    AS HYSTEROS W PERMANENT FALLOPAIN IMPLANT  2013    ESSURE    HEAD & NECK SURGERY      WISDOM TEETH EXTRACTION    LAPAROSCOPIC SALPINGECTOMY Bilateral 8/31/2018    Procedure: LAPAROSCOPIC SALPINGECTOMY;  LAPAROSCOPIC BILATERAL SALPINGECTOMY FOR ESSURE REMOVAL LYSIS OF ADHESIONS AND DILATION AND CURETTAGE;  Surgeon: Patrick Mosqueda MD;  Location: [facility]         SOCIAL HISTORY:  Social History     Socioeconomic History    Marital status:      Spouse name: Not on file    Number of children: Not on file    Years of education: Not on file    Highest education level: Not on file   Occupational History    Occupation: CNA   Tobacco Use    Smoking status: Never    Smokeless tobacco: Never   Substance and Sexual Activity    Alcohol use: Yes     Alcohol/week: 0.0 standard drinks of alcohol     Comment: 2-3 glasses wine/yr    Drug use: No    Sexual activity: Yes     Partners: Male     Birth control/protection: Female Surgical     Comment: tubal ligation   Other Topics Concern    Parent/sibling w/ CABG, MI or angioplasty before 65F 55M? Not Asked   Social History Narrative    Not on file     Social Determinants of Health     Financial Resource Strain: Not on file   Food Insecurity: Not on file   Transportation Needs: Not on file   Physical Activity: Not on file   Stress: Not on file   Social Connections: Not on file   Interpersonal Safety: Not on file   Housing Stability: Not on file     Nurse full time  "    FAMILY HISTORY:  Family History   Problem Relation Age of Onset    Hypertension Mother          PHYSICAL EXAM:  Vital signs:  Ht 1.676 m (5' 6\")   Wt 77.1 kg (170 lb)   BMI 27.44 kg/m     ECO  GENERAL/CONSTITUTIONAL: No acute distress. Healthy, alert.  EYES: No scleral icterus.  No redness or discharge.    RESPIRATORY: No audible wheeze, cough, or visible cyanosis.  No visible retractions or increased work of breathing.  Able to speak fully in complete sentences.  MUSCULOSKELETAL: Normal range of motion.  NEUROLOGIC: Alert, oriented, answers questions appropriately. No tremor. Mentation intact and speech normal  INTEGUMENTARY: No jaundice.  No obvious rash or skin lesions.  PSYCHIATRIC:  Mentation appears normal, affect normal/bright, judgement and insight intact, normal speech and appearance well-groomed.    The rest of a comprehensive physical exam is deferred due to public [facility] emergency video visit restrictions.       LABS:  CBC RESULTS:   Recent Labs   Lab Test 18  1339 18  1619   WBC  --  4.3   RBC  --  4.79   HGB 11.6* 11.7   HCT  --  35.3   MCV  --  74*   MCH  --  24.4*   MCHC  --  33.1   RDW  --  16.9*   PLT  --  332       Recent Labs   Lab Test 18  1555 17  1118    139   POTASSIUM 3.7 4.0   CHLORIDE 102 106   CO2 24 25   ANIONGAP 8 8   GLC 89 89   BUN 9 14   CR 0.71 0.71   SCAR 9.2 8.7         PATHOLOGY:  none    IMAGING:      ASSESSMENT/PLAN:  Melissa Painter is a 49 year old female who is here for further evaluation of anemia previous hemoglobin 12.8 in 2023 most recently down to 11.5      I had a discussion with the patient that she appears to have anemia of chronic disease etiology unclear.  I would like to obtain a peripheral smear, repeat CBC differential and SPEP.  I will touch base with her after is resulted.  If those testings are normal we will send her back to her primary care physician.  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  Will " check an [place]ASIA    1.  Anemia: Workup as above  2.  Leukopenia: Workup as above       I will touch base with her after is resulted to see whether we need to do a future follow-up      Bahman Alvarado MD  Hematology/Oncology  [facility]

## 2024-07-08 NOTE — PROGRESS NOTES
Virtual Visit Details    Type of service:  Video Visit     Originating Location (pt. Location): Home    Distant Location (provider location):  On-site  Platform used for Video Visit: Preet

## 2024-07-08 NOTE — LETTER
"7/8/2024      Melissa Painter  [address]  [place] [address]      Dear Colleague,    Thank you for referring your patient, Melissa Painter, to the [facility] [place]. Please see a copy of my visit note below.    Virtual Visit Details    Type of service:  Video Visit     Originating Location (pt. Location): {video visit patient location:288812::\"Home\"}  {PROVIDER LOCATION On-site should be selected for visits conducted from your clinic location or adjoining [facility] hospital, academic office, or other nearby [facility] building. Off-site should be selected for all other provider locations, including home:107[address]}  Distant Location (provider location):  {virtual location provider:049048}  Platform used for Video Visit: {Virtual Visit Platforms:998905::\"Tinteo\"}    [facility]    Hematology/Oncology New Patient Note      Today's Date: 07/08/24    Reason for Consult: Anemia and Leukopenia      HISTORY OF PRESENT ILLNESS: Melissa Painter is a 49 year old female who presents with further evaluation  leukopenia and anemia.     Sometimes last year menstruation slowed down, 3-4 months no menstruation . This year has period not heavy . Regular 4 more days and then stop. Couple of months no night sweats.  Lost about 60 lbs on Wegovy FH of no cancers . Fh of low counts, patient has had it for a long time.   Last infusion  iron 2018. Doesn't get many infections.  She had recent labs done which showed a hemoglobin of 11.5 in March 2024 iron saturation normal at 55% B12 levels normal ferritin normal.  White blood cell count was at 2.3.  Her menstruation is irregular and not heavy.  She had a previous history of heavy menstruation iron deficiency and last iron infusion again was in 2018.        REVIEW OF SYSTEMS:   14 point ROS was reviewed and is negative other than as noted above in HPI.       HOME MEDICATIONS:  Current Outpatient Medications   Medication Sig Dispense Refill     buPROPion " (WELLBUTRIN XL) 300 MG 24 hr tablet        CALCIUM 1000 + D 1000-800 MG-UNIT TABS TAKE ONE TABLET BY MOUTH EVERY DAY WITH FOOD FOR BONE HEALTH AND VITAMIN D SUPPLEMENTATION 100 tablet 3     cetirizine (ZYRTEC) 10 MG tablet TAKE ONE TABLET BY MOUTH EVERY DAY TO TREAT NASAL ALLERGIES 90 tablet 2     cholecalciferol 5000 UNITS CAPS Take 1 capsule (5,000 Units) by mouth daily INDICATION: LOW VITAMIN D, TAKE WITH FOOD 90 capsule 3     cyanocobalamin (VITAMIN B-12) 250 mcg half-tab        fluticasone (FLONASE) 50 MCG/ACT nasal spray Spray 2 sprays in nostril At Bedtime INDICATION: TO CONTROL NASAL ALLERGY SYMPTOMS 16 g PRN     folic acid (FOLVITE) 1 MG tablet        IBUPROFEN PO Take 400 mg by mouth every 6 hours as needed        Misc Natural Products (ADV TURMERIC CURCUMIN COMPLEX) CAPS        Omega-3 Fatty Acids (FISH OIL DOUBLE STRENGTH) 1200 MG CAPS        OZEMPIC, 2 MG/DOSE, 8 MG/3ML pen        zinc 50 MG TABS            ALLERGIES:  Allergies   Allergen Reactions     Levaquin [Levofloxacin] Itching     Chloroquine Itching     Influenza Vaccine Live          PAST MEDICAL HISTORY:  Past Medical History:   Diagnosis Date     Anemia      Chronic fatigue 4/7/2016     HA (headache)      Obese      Urgency-frequency syndrome          PAST SURGICAL HISTORY:  Past Surgical History:   Procedure Laterality Date     AS HYSTEROS W PERMANENT FALLOPAIN IMPLANT  2013    ESSURE     HEAD & NECK SURGERY      WISDOM TEETH EXTRACTION     LAPAROSCOPIC SALPINGECTOMY Bilateral 8/31/2018    Procedure: LAPAROSCOPIC SALPINGECTOMY;  LAPAROSCOPIC BILATERAL SALPINGECTOMY FOR ESSURE REMOVAL LYSIS OF ADHESIONS AND DILATION AND CURETTAGE;  Surgeon: Patrick Mosqueda MD;  Location: [facility]         SOCIAL HISTORY:  Social History     Socioeconomic History     Marital status:      Spouse name: Not on file     Number of children: Not on file     Years of education: Not on file     Highest education level: Not on file   Occupational History      "Occupation: CNA   Tobacco Use     Smoking status: Never     Smokeless tobacco: Never   Substance and Sexual Activity     Alcohol use: Yes     Alcohol/week: 0.0 standard drinks of alcohol     Comment: 2-3 glasses wine/yr     Drug use: No     Sexual activity: Yes     Partners: Male     Birth control/protection: Female Surgical     Comment: tubal ligation   Other Topics Concern     Parent/sibling w/ CABG, MI or angioplasty before 65F 55M? Not Asked   Social History Narrative     Not on file     Social Determinants of Health     Financial Resource Strain: Not on file   Food Insecurity: Not on file   Transportation Needs: Not on file   Physical Activity: Not on file   Stress: Not on file   Social Connections: Not on file   Interpersonal Safety: Not on file   Housing Stability: Not on file     Nurse full time     FAMILY HISTORY:  Family History   Problem Relation Age of Onset     Hypertension Mother          PHYSICAL EXAM:  Vital signs:  Ht 1.676 m (5' 6\")   Wt 77.1 kg (170 lb)   BMI 27.44 kg/m     ECO  GENERAL/CONSTITUTIONAL: No acute distress. Healthy, alert.  EYES: No scleral icterus.  No redness or discharge.    RESPIRATORY: No audible wheeze, cough, or visible cyanosis.  No visible retractions or increased work of breathing.  Able to speak fully in complete sentences.  MUSCULOSKELETAL: Normal range of motion.  NEUROLOGIC: Alert, oriented, answers questions appropriately. No tremor. Mentation intact and speech normal  INTEGUMENTARY: No jaundice.  No obvious rash or skin lesions.  PSYCHIATRIC:  Mentation appears normal, affect normal/bright, judgement and insight intact, normal speech and appearance well-groomed.    The rest of a comprehensive physical exam is deferred due to public health emergency video visit restrictions.       LABS:  CBC RESULTS:   Recent Labs   Lab Test 18  1339 18  1619   WBC  --  4.3   RBC  --  4.79   HGB 11.6* 11.7   HCT  --  35.3   MCV  --  74*   MCH  --  24.4*   MCHC  --  " 33.1   RDW  --  16.9*   PLT  --  332       Recent Labs   Lab Test 02/20/18  1555 05/30/17  1118    139   POTASSIUM 3.7 4.0   CHLORIDE 102 106   CO2 24 25   ANIONGAP 8 8   GLC 89 89   BUN 9 14   CR 0.71 0.71   SCAR 9.2 8.7         PATHOLOGY:  none    IMAGING:      ASSESSMENT/PLAN:  Melissa Painter is a 49 year old female who is here for further evaluation of anemia previous hemoglobin 12.8 in December 2023 most recently down to 11.5      I had a discussion with the patient that she appears to have anemia of chronic disease etiology unclear.  I would like to obtain a peripheral smear, repeat CBC differential and SPEP.  I will touch base with her after is resulted.  If those testings are normal we will send her back to her primary care physician.  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  Will check an [place]ASIA    1.  Anemia: Workup as above  2.  Leukopenia: Workup as above       I will touch base with her after is resulted to see whether we need to do a future follow-up      Bahman Alvarado MD  Hematology/Oncology  [facility]       Again, thank you for allowing me to participate in the care of your patient.        Sincerely,        Bahman Alvarado MD

## 2024-07-08 NOTE — NURSING NOTE
Current patient location: [address] [place] [id number]    Is the patient currently in the state of [place]? YES    Visit mode:VIDEO    If the visit is dropped, the patient can be reconnected by: VIDEO VISIT: Text to cell phone:   Telephone Information:   Mobile [phone]       Will anyone else be joining the visit? NO  (If patient encounters technical issues they should call [phone])    How would you like to obtain your AVS? MyChart    Are changes needed to the allergy or medication list? No    Are refills needed on medications prescribed by this physician? NO    Reason for visit: Consult    Sonia ESPARZA

## 2024-07-09 ENCOUNTER — LAB (OUTPATIENT)
Dept: LAB | Facility: CLINIC | Age: 49
End: 2024-07-09
Payer: OTHER GOVERNMENT

## 2024-07-09 DIAGNOSIS — D64.9 ANEMIA, UNSPECIFIED TYPE: ICD-10-CM

## 2024-07-09 DIAGNOSIS — D70.9 NEUTROPENIA, UNSPECIFIED TYPE (H): ICD-10-CM

## 2024-07-09 LAB
BASOPHILS # BLD AUTO: 0 10E3/UL (ref 0–0.2)
BASOPHILS NFR BLD AUTO: 0 %
EOSINOPHIL # BLD AUTO: 0 10E3/UL (ref 0–0.7)
EOSINOPHIL NFR BLD AUTO: 1 %
ERYTHROCYTE [DISTWIDTH] IN BLOOD BY AUTOMATED COUNT: 15.7 % (ref 10–15)
HCT VFR BLD AUTO: 37.7 %
HGB BLD-MCNC: 12.1 G/DL
IMM GRANULOCYTES # BLD: 0 10E3/UL
IMM GRANULOCYTES NFR BLD: 0 %
LYMPHOCYTES # BLD AUTO: 1.5 10E3/UL (ref 0.8–5.3)
LYMPHOCYTES NFR BLD AUTO: 51 %
MCH RBC QN AUTO: 23.3 PG (ref 26.5–33)
MCHC RBC AUTO-ENTMCNC: 32.1 G/DL (ref 31.5–36.5)
MCV RBC AUTO: 73 FL (ref 78–100)
MONOCYTES # BLD AUTO: 0.2 10E3/UL (ref 0–1.3)
MONOCYTES NFR BLD AUTO: 5 %
NEUTROPHILS # BLD AUTO: 1.3 10E3/UL (ref 1.6–8.3)
NEUTROPHILS NFR BLD AUTO: 42 %
PLATELET # BLD AUTO: 248 10E3/UL (ref 150–450)
RBC # BLD AUTO: 5.19 10E6/UL
RETICS # AUTO: 0.07 10E6/UL (ref 0.03–0.1)
RETICS/RBC NFR AUTO: 1.3 % (ref 0.5–2)
TOTAL PROTEIN SERUM FOR ELP: 7 G/DL (ref 6.4–8.3)
WBC # BLD AUTO: 3 10E3/UL (ref 4–11)

## 2024-07-09 PROCEDURE — 85025 COMPLETE CBC W/AUTO DIFF WBC: CPT

## 2024-07-09 PROCEDURE — 85045 AUTOMATED RETICULOCYTE COUNT: CPT

## 2024-07-09 PROCEDURE — 84155 ASSAY OF PROTEIN SERUM: CPT

## 2024-07-09 PROCEDURE — 84165 PROTEIN E-PHORESIS SERUM: CPT | Performed by: PATHOLOGY

## 2024-07-09 PROCEDURE — 36415 COLL VENOUS BLD VENIPUNCTURE: CPT

## 2024-07-09 PROCEDURE — 85060 BLOOD SMEAR INTERPRETATION: CPT | Performed by: PATHOLOGY

## 2024-07-09 PROCEDURE — 86038 ANTINUCLEAR ANTIBODIES: CPT

## 2024-07-10 LAB
ALBUMIN SERPL ELPH-MCNC: 4.1 G/DL (ref 3.7–5.1)
ALPHA1 GLOB SERPL ELPH-MCNC: 0.2 G/DL (ref 0.2–0.4)
ALPHA2 GLOB SERPL ELPH-MCNC: 0.6 G/DL (ref 0.5–0.9)
ANA SER QL IF: NEGATIVE
B-GLOBULIN SERPL ELPH-MCNC: 0.7 G/DL (ref 0.6–1)
GAMMA GLOB SERPL ELPH-MCNC: 1.4 G/DL (ref 0.7–1.6)
M PROTEIN SERPL ELPH-MCNC: 0 G/DL
PATH REPORT.COMMENTS IMP SPEC: NORMAL
PATH REPORT.FINAL DX SPEC: NORMAL
PATH REPORT.MICROSCOPIC SPEC OTHER STN: NORMAL
PATH REPORT.MICROSCOPIC SPEC OTHER STN: NORMAL
PATH REPORT.RELEVANT HX SPEC: NORMAL
PROT PATTERN SERPL ELPH-IMP: NORMAL

## 2024-08-29 ENCOUNTER — OFFICE VISIT (OUTPATIENT)
Dept: OBGYN | Facility: CLINIC | Age: 49
End: 2024-08-29
Payer: OTHER GOVERNMENT

## 2024-08-29 VITALS — WEIGHT: 171 LBS | SYSTOLIC BLOOD PRESSURE: 106 MMHG | BODY MASS INDEX: 27.6 KG/M2 | DIASTOLIC BLOOD PRESSURE: 68 MMHG

## 2024-08-29 DIAGNOSIS — N93.9 ABNORMAL UTERINE BLEEDING (AUB): Primary | ICD-10-CM

## 2024-08-29 PROCEDURE — 58100 BIOPSY OF UTERUS LINING: CPT | Performed by: OBSTETRICS & GYNECOLOGY

## 2024-08-29 PROCEDURE — 99459 PELVIC EXAMINATION: CPT | Performed by: OBSTETRICS & GYNECOLOGY

## 2024-08-29 PROCEDURE — 99213 OFFICE O/P EST LOW 20 MIN: CPT | Mod: 25 | Performed by: OBSTETRICS & GYNECOLOGY

## 2024-08-29 PROCEDURE — 88305 TISSUE EXAM BY PATHOLOGIST: CPT | Performed by: PATHOLOGY

## 2024-08-29 RX ORDER — SEMAGLUTIDE 2.4 MG/.75ML
2.4 INJECTION, SOLUTION SUBCUTANEOUS
COMMUNITY
Start: 2024-06-12

## 2024-08-29 NOTE — PROGRESS NOTES
SUBJECTIVE:                                                   Melissa Painter is a 49 year old female who presents to clinic today for the following health issue(s):  Patient presents with:  Abnormal Uterine Bleeding: No periods x 8 months and then started bleeding approx 24 and bled for 3-4 weeks. Also c/o r sided pelvic discomfort    HPI:  Madina presents for abnormal uterine bleeding versus post-menopausal bleeding. She was last seen in 2023 at which time her FSH was noted to be 86.7 and she had a few months of amenorrhea at that time. After about 8 months of no menses her menses resumed in  but lasted for almost one month. She has not had any bleeding since then. She had a remote history of a left sided ovarian cyst (2018) that resolved. She has noted left sided discomfort as well.     Patient's last menstrual period was 2024 (approximate)..     Patient is sexually active, .  Using tubal ligation for contraception.    reports that she has never smoked. She has never used smokeless tobacco.    STD testing offered?  Declined    Health maintenance updated:  yes    Today's PHQ-2 Score:       2023     9:45 AM   PHQ-2 (  [employer])   Q1: Little interest or pleasure in doing things 0   Q2: Feeling down, depressed or hopeless 1   PHQ-2 Score 1     Today's PHQ-9 Score:       2023     9:45 AM   PHQ-9 SCORE   PHQ-9 Total Score 5     Today's REGINALDO-7 Score:       2023     9:45 AM   REGINALDO-7 SCORE   Total Score 0       Problem list and histories reviewed & adjusted, as indicated.  Additional history: as documented.    Patient Active Problem List   Diagnosis    Iron deficiency anemia, unspecified iron deficiency    Chronic fatigue    Vitamin D deficiency    Hair loss    Immunity status testing    Excessive or frequent menstruation    CARDIOVASCULAR SCREENING; LDL GOAL LESS THAN 160    Seasonal allergic rhinitis    Rectocele    Cystocele, midline    Urgency-frequency syndrome     Cervicalgia    Acute bilateral low back pain without sciatica    Hepatitis B carrier (H)    Acute right-sided low back pain without sciatica    Dyspareunia, female    High-tone pelvic floor dysfunction    Anemia, iron deficiency    Intestinal malabsorption    Adverse effect of iron     Past Surgical History:   Procedure Laterality Date    AS HYSTEROS W PERMANENT FALLOPAIN IMPLANT  2013    ESSURE    HEAD & NECK SURGERY      WISDOM TEETH EXTRACTION    LAPAROSCOPIC SALPINGECTOMY Bilateral 8/31/2018    Procedure: LAPAROSCOPIC SALPINGECTOMY;  LAPAROSCOPIC BILATERAL SALPINGECTOMY FOR ESSURE REMOVAL LYSIS OF ADHESIONS AND DILATION AND CURETTAGE;  Surgeon: Patrick Mosqueda MD;  Location: [facility]      Social History     Tobacco Use    Smoking status: Never    Smokeless tobacco: Never   Substance Use Topics    Alcohol use: Yes     Alcohol/week: 0.0 standard drinks of alcohol     Comment: 2-3 glasses wine/yr      Problem (# of Occurrences) Relation (Name,Age of Onset)    Hypertension (1) Mother              Current Outpatient Medications   Medication Sig Dispense Refill    buPROPion (WELLBUTRIN XL) 300 MG 24 hr tablet       CALCIUM 1000 + D 1000-800 MG-UNIT TABS TAKE ONE TABLET BY MOUTH EVERY DAY WITH FOOD FOR BONE HEALTH AND VITAMIN D SUPPLEMENTATION 100 tablet 3    cetirizine (ZYRTEC) 10 MG tablet TAKE ONE TABLET BY MOUTH EVERY DAY TO TREAT NASAL ALLERGIES 90 tablet 2    cholecalciferol 5000 UNITS CAPS Take 1 capsule (5,000 Units) by mouth daily INDICATION: LOW VITAMIN D, TAKE WITH FOOD 90 capsule 3    cyanocobalamin (VITAMIN B-12) 250 mcg half-tab       fluticasone (FLONASE) 50 MCG/ACT nasal spray Spray 2 sprays in nostril At Bedtime INDICATION: TO CONTROL NASAL ALLERGY SYMPTOMS 16 g PRN    folic acid (FOLVITE) 1 MG tablet       IBUPROFEN PO Take 400 mg by mouth every 6 hours as needed       Misc Natural Products (ADV TURMERIC CURCUMIN COMPLEX) CAPS       Omega-3 Fatty Acids (FISH OIL DOUBLE STRENGTH) 1200 MG CAPS        WEGOVY 2.4 MG/0.75ML pen Inject 2.4 mg subcutaneously.      zinc 50 MG TABS        No current facility-administered medications for this visit.     Allergies   Allergen Reactions    Levaquin [Levofloxacin] Itching    Chloroquine Itching    Influenza Vaccine Live        ROS:  12 point review of systems negative other than symptoms noted below or in the HPI.  Genitourinary: Irregular Menses and Pelvic Pain  No urinary frequency or dysuria, bladder or kidney problems      OBJECTIVE:     /68   Wt 77.6 kg (171 lb)   LMP 06/05/2024 (Approximate)   BMI 27.60 kg/m    Body mass index is 27.6 kg/m .    Exam:  Constitutional:  Appearance: Well nourished, well developed alert, in no acute distress  Gastrointestinal:  Abdominal Examination:  Abdomen nontender to palpation, tone normal without rigidity or guarding, no masses present, umbilicus without lesions; Liver/Spleen:  No hepatomegaly present, liver nontender to palpation; Hernias:  No hernias present  Skin: General Inspection:  No rashes present, no lesions present, no areas of discoloration.  Neurologic:  Mental Status:  Oriented X3.  Normal strength and tone, sensory exam grossly normal, mentation intact and speech normal.    Psychiatric:  Mentation appears normal and affect normal/bright.  Pelvic Exam:  External Genitalia:     Normal appearance for age, no discharge present, no tenderness present, no inflammatory lesions present, color normal  Vagina:     Normal vaginal vault without central or paravaginal defects, no discharge present, no inflammatory lesions present, no masses present  Bladder:     Nontender to palpation  Urethra:   Urethral Body:  Urethra palpation normal, urethra structural support normal   Urethral Meatus:  No erythema or lesions present  Cervix:     Appearance healthy, no lesions present, nontender to palpation, no bleeding present  Uterus:     Uterus: firm, normal sized and nontender, retroverted in position.   Adnexa:     No adnexal  tenderness present, no adnexal masses present  Perineum:     Perineum within normal limits, no evidence of trauma, no rashes or skin lesions present  Anus:     Anus within normal limits, no hemorrhoids present  Inguinal Lymph Nodes:     No lymphadenopathy present  Pubic Hair:     Normal pubic hair distribution for age  Genitalia and Groin:     No rashes present, no lesions present, no areas of discoloration, no masses present       ASSESSMENT/PLAN:                                                        ICD-10-CM    1. Abnormal uterine bleeding (AUB)  N93.9 US Transvaginal Pelvic Non-OB     ENDOMETRIAL BIOPSY W/O CERVICAL DILATION     Surgical Pathology Exam        I recommend an endometrial biopsy as this may be post-menopausal bleeding although she has not had 12 months of amenorrhea. I also recommend a pelvic ultrasound as well due to the left sided discomfort. The mostly likely explanation is that this was abnromal bleeding from anovulation and she is likely fully transitioning into menopause at this time.     Noelle Rodriguez MD  [facility] [place]    INDICATIONS:                                                    Is a pregnancy test required: No.  Was a consent obtained?  Yes    Having endometrial biopsy for post-menopausal bleeding and abnormal uterine bleeding    Today's PHQ-2 Score:       12/14/2023     9:45 AM   PHQ-2 ( 1999 [employer])   Q1: Little interest or pleasure in doing things 0   Q2: Feeling down, depressed or hopeless 1   PHQ-2 Score 1       PROCEDURE;                                                      A speculum was placed in the vagina and cervix prepped with betadine. A tenaculum was attached to the cervix. A small plastic 5 mm Pipelle syringe curette was inserted into the cervical canal. The uterus was sounded to 8 cm's. A vigorous four quadrant biopsy was performed, removing amount moderate  of tissue. The speculum was removed. This tissue was placed in Formalin and  sent to pathology.    The patient tolerated the procedure  well and she reported there was  cramping.      POST PROCEDURE;                                                      There  was  cramping at the time of discharge. She  tolerated the procedure well. There were no complications. Patient was discharged in stable condition.    Patient advised to call the clinic if severe pelvic pain, fever or heavy bleeding.    Noelle Rodriguez MD

## 2024-09-03 LAB
PATH REPORT.COMMENTS IMP SPEC: NORMAL
PATH REPORT.COMMENTS IMP SPEC: NORMAL
PATH REPORT.FINAL DX SPEC: NORMAL
PATH REPORT.GROSS SPEC: NORMAL
PATH REPORT.MICROSCOPIC SPEC OTHER STN: NORMAL
PATH REPORT.RELEVANT HX SPEC: NORMAL
PHOTO IMAGE: NORMAL

## 2025-02-09 ENCOUNTER — HEALTH MAINTENANCE LETTER (OUTPATIENT)
Age: 50
End: 2025-02-09

## 2025-02-12 ENCOUNTER — OFFICE VISIT (OUTPATIENT)
Dept: FAMILY MEDICINE | Facility: CLINIC | Age: 50
End: 2025-02-12
Payer: OTHER GOVERNMENT

## 2025-02-12 ENCOUNTER — ANCILLARY PROCEDURE (OUTPATIENT)
Dept: GENERAL RADIOLOGY | Facility: CLINIC | Age: 50
End: 2025-02-12
Attending: NURSE PRACTITIONER
Payer: OTHER GOVERNMENT

## 2025-02-12 VITALS
HEART RATE: 80 BPM | RESPIRATION RATE: 18 BRPM | SYSTOLIC BLOOD PRESSURE: 113 MMHG | BODY MASS INDEX: 31.1 KG/M2 | OXYGEN SATURATION: 98 % | DIASTOLIC BLOOD PRESSURE: 79 MMHG | TEMPERATURE: 97.5 F | WEIGHT: 193.5 LBS | HEIGHT: 66 IN

## 2025-02-12 DIAGNOSIS — Z11.1 SCREENING EXAMINATION FOR PULMONARY TUBERCULOSIS: Primary | ICD-10-CM

## 2025-02-12 DIAGNOSIS — Z71.84 TRAVEL ADVICE ENCOUNTER: ICD-10-CM

## 2025-02-12 DIAGNOSIS — Z11.1 SCREENING EXAMINATION FOR PULMONARY TUBERCULOSIS: ICD-10-CM

## 2025-02-12 PROCEDURE — 90471 IMMUNIZATION ADMIN: CPT | Mod: GA | Performed by: NURSE PRACTITIONER

## 2025-02-12 PROCEDURE — 99402 PREV MED CNSL INDIV APPRX 30: CPT | Mod: 25 | Performed by: NURSE PRACTITIONER

## 2025-02-12 PROCEDURE — 71046 X-RAY EXAM CHEST 2 VIEWS: CPT | Mod: TC | Performed by: INTERNAL MEDICINE

## 2025-02-12 PROCEDURE — 90715 TDAP VACCINE 7 YRS/> IM: CPT | Mod: GA | Performed by: NURSE PRACTITIONER

## 2025-02-12 PROCEDURE — 90691 TYPHOID VACCINE IM: CPT | Mod: GA | Performed by: NURSE PRACTITIONER

## 2025-02-12 PROCEDURE — 90472 IMMUNIZATION ADMIN EACH ADD: CPT | Mod: GA | Performed by: NURSE PRACTITIONER

## 2025-02-12 RX ORDER — ATOVAQUONE AND PROGUANIL HYDROCHLORIDE 250; 100 MG/1; MG/1
1 TABLET, FILM COATED ORAL DAILY
Qty: 15 TABLET | Refills: 0 | Status: SHIPPED | OUTPATIENT
Start: 2025-02-12

## 2025-02-12 RX ORDER — TIRZEPATIDE 5 MG/.5ML
5 INJECTION, SOLUTION SUBCUTANEOUS
COMMUNITY
Start: 2025-02-12

## 2025-02-12 ASSESSMENT — PAIN SCALES - GENERAL: PAINLEVEL_OUTOF10: NO PAIN (0)

## 2025-02-12 NOTE — PROGRESS NOTES
"Nurse Note ( Pre-Travel Consult)    Itinerary:  [place]    Departure Date: 02/23    Return Date: 03/06    Length of Trip 1.5 weeks    Reason for Travel: Tourism  Visiting friends and relatives         Urban or rural: urban    Accommodations: Family home        IMMUNIZATION HISTORY  Have you received any immunizations within the past 4 weeks?  No  Have you ever fainted from having your blood drawn or from an injection?  No  Have you ever had a fever reaction to vaccination?  Yes  Have you ever had any bad reaction or side effect from any vaccination?  No  Do you live (or work closely) with anyone who has AIDS, an AIDS-like condition, any other immune disorder or who is on chemotherapy for cancer?  No  Do you have a family history of immunodeficiency?  No  Have you received any injection of immune globulin or any blood products during the past 12 months?  No    Patient roomed by Escobar Patel  Melissa Painter is a 49 year old female seen today alone for counsultation for international travel.   Patient will be departing in  11 day(s) and  traveling with spouse.      Patient itinerary :  will be in the urban region of [facility] and possibly [facility] which risk for Malaria, Rabies, food borne illnesses, motor vehicle accidents, and Typhoid. exposure.      Patient's activities will include sightseeing, visiting friends and relatives, and [employer].    Patient's country [place] birth is [place]    Special medical concerns: Anxiety/depression  Pre-travel questionnaire was completed by patient and reviewed by provider.     Vitals: /79   Pulse 80   Temp 97.5  F (36.4  C) (Temporal)   Resp 18   Ht 1.664 m (5' 5.51\")   Wt 87.8 kg (193 lb 8 oz)   LMP 07/12/2024 (Within Weeks)   SpO2 98%   BMI 31.70 kg/m    BMI= Body mass index is 31.7 kg/m .    EXAM:  General:  Well-nourished, well-developed in no acute distress.  Appears to be stated age, interacts appropriately and expresses understanding of " information given to patient.    Current Outpatient Medications   Medication Sig Dispense Refill    buPROPion (WELLBUTRIN XL) 300 MG 24 hr tablet       CALCIUM 1000 + D 1000-800 MG-UNIT TABS TAKE ONE TABLET BY MOUTH EVERY DAY WITH FOOD FOR BONE HEALTH AND VITAMIN D SUPPLEMENTATION 100 tablet 3    cetirizine (ZYRTEC) 10 MG tablet TAKE ONE TABLET BY MOUTH EVERY DAY TO TREAT NASAL ALLERGIES 90 tablet 2    cholecalciferol 5000 UNITS CAPS Take 1 capsule (5,000 Units) by mouth daily INDICATION: LOW VITAMIN D, TAKE WITH FOOD 90 capsule 3    cyanocobalamin (VITAMIN B-12) 250 mcg half-tab       fluticasone (FLONASE) 50 MCG/ACT nasal spray Spray 2 sprays in nostril At Bedtime INDICATION: TO CONTROL NASAL ALLERGY SYMPTOMS 16 g PRN    folic acid (FOLVITE) 1 MG tablet       IBUPROFEN PO Take 400 mg by mouth every 6 hours as needed       Misc Natural Products (ADV TURMERIC CURCUMIN COMPLEX) CAPS       Omega-3 Fatty Acids (FISH OIL DOUBLE STRENGTH) 1200 MG CAPS       WEGOVY 2.4 MG/0.75ML pen Inject 2.4 mg subcutaneously.      zinc 50 MG TABS        Patient Active Problem List   Diagnosis    Iron deficiency anemia, unspecified iron deficiency    Chronic fatigue    Vitamin D deficiency    Hair loss    Immunity status testing    Excessive or frequent menstruation    CARDIOVASCULAR SCREENING; LDL GOAL LESS THAN 160    Seasonal allergic rhinitis    Rectocele    Cystocele, midline    Urgency-frequency syndrome    Cervicalgia    Acute bilateral low back pain without sciatica    Hepatitis B carrier (H)    Acute right-sided low back pain without sciatica    Dyspareunia, female    High-tone pelvic floor dysfunction    Anemia, iron deficiency    Intestinal malabsorption    Adverse effect of iron     Allergies   Allergen Reactions    Levaquin [Levofloxacin] Itching    Chloroquine Itching    Influenza Vaccine Live          Immunizations discussed include:   Covid 19: Declined  Not concerned about risk of disease  Hepatitis A:   immune  Hepatitis B: immune  Influenza: Declined  reaction  Typhoid: Ordered/given today, risks, benefits and side effects reviewed  Rabies: Declined  reviewed managment of a animal bite or scratch (washing wound, seek medical care within 24 hours for post exposure prophylaxis )  Yellow Fever: Not indicated  Japanese Encephalitis: { Nauruan Encephalitis:050962}  Meningococcus: { IMMUNIZATIONS:614424}  Tetanus/Diphtheria: { IMMUNIZATIONS:422359}  Measles/Mumps/Rubella: { MMR:692370}  Cholera: { Cholera:774075}  Polio: { IMMUNIZATIONS:887765}  Pneumococcal: { Pneumococcal:403102}  Varicella: { IMMUNIZATIONS:209888}  Shingrix: { IMMUNIZATIONS: 131347}  HPV:  { IMMUNIZATIONS:838354}   Chikunguya: { IMMUNIZATIONS:183755}   Mpox:  { IMMUNIZATIONS:587414}     TB: ***    Altitude Exposure on this trip: ***  Past tolerance to Altitude: ***    ASSESSMENT/PLAN:  {Dia Picklist:670001}  I have reviewed general recommendations for safe travel   including: food/water precautions, insect precautions, safer sex   practices given high prevalence of Zika, HIV and other STDs,   roadway safety. Educational materials and [employer] report provided.    Malaraia prophylaxis recommended: {PTMALARIAPX:982594}  Symptomatic treatment for traveler's diarrhea: {PTTRAVELDIARRHEA:629374:x}  Altitude illness prevention and treatment: ***      Evacuation insurance advised and resources were provided to patient.    Total visit time {MINUTES:430300} minutes  with over 50% of time spent counseling patient and shared decision making as detailed above.    Lynne Buck CNP  Certificate in Travel Health                             Travel Health

## 2025-02-12 NOTE — PATIENT INSTRUCTIONS
Thank you for visiting the [facility] : [phone]  Today February 12, 2025 you received the    Tetanus (Tdap) Vaccine    Typhoid - injectable. This vaccine is valid for two years.     Follow up vaccine appointments can be made as a NURSE ONLY visit at the Travel Clinic, (BE PREPARED TO WAIT, ) or at designated [facility].    If you are receiving the Rabies vaccines series, it is important that you follow the exact schedule ordered.     Pre-travel     We recommend that you purchase Medical Evacuation Insurance prior to your departure.  Https://wwwnc.cdc.gov/travel/page/insurance    [place] your travel plans with the  Department of [employer] through STEP ( Smart Traveler Enrollment Program ) https://step.state.gov.  STEP is a free service to allow [place] citizens and nationals traveling and living abroad to enroll their trip with the nearest [place] Embassy or Consulate.    Animal Exposure: Avoid all mammals even if they look healthy.  If there is a bite, scratch or even a lick, wash area immediately with soap and water for 15 minutes and seek medical care within 24 hours for evaluation of Rabies post exposure treatment.  Contact your Medical Evacuation Insurance.    Repiratory illness prevention strategies ( Covid and Influenza ) CDC recommendations:  Consider wearing a mask in crowded or poorly ventilated indoor areas, including on public transportation and in transportation hubs.  Hand washing: frequent, thorough handwashing with soap and water for 20 seconds. Use an alcohol-based hand  with at least 60% alcohol if soap and water are not readily available. Avoid touching face, nose, eyes, mouth unless you have done appropriate hand washing as above.  VACCINES: Staying up to date on COVID-19 vaccines significantly lowers the risk of getting very sick, being hospitalized, or dying from COVID-19. CDC recommends that everyone stay up to date on their COVID-19  vaccines, especially people with weakened immune systems.    Travel Covid 19 Testing:  updated 12/06/2021  International travelers: Pre-travel:  See country specific Embassy websites or airline websites.    Post travel: CDC recommends getting tested 3-5 days after your trip     Post-travel illness:  Contact your provider or [facility] if you develop a fever, rash, cough, diarrhea or other symptoms for up to 1 year after travel.  Inform your healthcare provider when and where you traveled to.    Please call the [facility] with any questions [phone]  Or send your provider a 'My Chart' note.

## 2025-02-12 NOTE — NURSING NOTE
Pt received TDAP vaccine and typhoid vaccine per ISABELA Barron CNP's orders. Pt given VIS forms prior to immunization administration.   Prior to immunization administration, verified patients identity using patient s name and date of birth. Patient instructed to remain in clinic for 15 minutes afterwards, and to report any adverse reactions.     Performed by Juan Luis Samaniego RN on 2/12/2025.

## 2025-02-13 NOTE — RESULT ENCOUNTER NOTE
Dallas Torres,    Your Chest Xray did not show evidence of of TB.      Lynne Buck (Lori) CNP  [facility]  Certificate in Travel Health

## 2025-05-12 ENCOUNTER — PATIENT OUTREACH (OUTPATIENT)
Dept: CARE COORDINATION | Facility: CLINIC | Age: 50
End: 2025-05-12

## 2025-07-14 ENCOUNTER — ANCILLARY PROCEDURE (OUTPATIENT)
Dept: BONE DENSITY | Facility: CLINIC | Age: 50
End: 2025-07-14
Attending: INTERNAL MEDICINE
Payer: COMMERCIAL

## 2025-07-14 DIAGNOSIS — Z78.0 ASYMPTOMATIC MENOPAUSAL STATE: ICD-10-CM

## 2025-07-14 PROCEDURE — 77080 DXA BONE DENSITY AXIAL: CPT | Mod: TC | Performed by: PHYSICIAN ASSISTANT

## 2025-07-31 ENCOUNTER — ANCILLARY PROCEDURE (OUTPATIENT)
Dept: GENERAL RADIOLOGY | Facility: CLINIC | Age: 50
End: 2025-07-31
Attending: INTERNAL MEDICINE

## 2025-07-31 DIAGNOSIS — S89.91XA UNSPECIFIED INJURY OF RIGHT LOWER LEG, INITIAL ENCOUNTER: ICD-10-CM

## 2025-08-30 ENCOUNTER — HEALTH MAINTENANCE LETTER (OUTPATIENT)
Age: 50
End: 2025-08-30

## (undated) DEVICE — GOWN IMPERVIOUS SPECIALTY XLG/XLONG 32474

## (undated) DEVICE — PREP DURAPREP 26ML APL 8630

## (undated) DEVICE — UTERINE MANIPULATOR RUMI 6.7MMX10CM UMG670

## (undated) DEVICE — Device

## (undated) DEVICE — ESU HANDPIECE OLYMPUS PK SPATULA PK-SP0533

## (undated) DEVICE — ENDO TROCAR FIRST ENTRY KII FIOS Z-THRD 11X100MM CTF33

## (undated) DEVICE — SU SILK 2-0 TIE 24" SA75H

## (undated) DEVICE — CATH INTERMITTENT CLEAN-CATH FEMALE 14FR 6" VINYL LF 420614

## (undated) DEVICE — TUBING IRRIG TUR Y TYPE 96" LF 6543-01

## (undated) DEVICE — EVAC SYSTEM CLEAR FLOW SC082500

## (undated) DEVICE — SOL NACL 0.9% INJ 1000ML BAG 2B1324X

## (undated) DEVICE — LINEN TOWEL PACK X5 5464

## (undated) DEVICE — PACK SET-UP STD 9102

## (undated) DEVICE — TUBING C02 INSUFFLATION LAP FILTER HEATER 6198

## (undated) DEVICE — GLOVE PROTEXIS W/NEU-THERA 8.0  2D73TE80

## (undated) DEVICE — ENDO TROCAR SLEEVE KII Z-THREADED 05X100MM CTS02

## (undated) DEVICE — SOL WATER IRRIG 1000ML BOTTLE 2F7114

## (undated) DEVICE — SYSTEM CLEARIFY VISUALIZATION 21-345

## (undated) DEVICE — TUBING SUCTION 12"X1/4" N612

## (undated) DEVICE — SU VICRYL 4-0 PS-2 18" UND J496H

## (undated) DEVICE — ENDO TROCAR FIRST ENTRY KII FIOS Z-THRD 05X100MM CTF03

## (undated) DEVICE — ESU FCP OLYMPUS PK CUTTING 5MMX33CM PK-CF0533

## (undated) DEVICE — SU VICRYL 0 CT-1 27" J340H

## (undated) DEVICE — SUCTION CANISTER MEDIVAC LINER 3000ML W/LID 65651-530

## (undated) DEVICE — SUCTION IRR STRYKERFLOW II W/TIP 250-070-520

## (undated) RX ORDER — HYDROCODONE BITARTRATE AND ACETAMINOPHEN 5; 325 MG/1; MG/1
TABLET ORAL
Status: DISPENSED
Start: 2018-08-31

## (undated) RX ORDER — TRIAMCINOLONE ACETONIDE 40 MG/ML
INJECTION, SUSPENSION INTRA-ARTICULAR; INTRAMUSCULAR
Status: DISPENSED
Start: 2018-05-10

## (undated) RX ORDER — FENTANYL CITRATE 50 UG/ML
INJECTION, SOLUTION INTRAMUSCULAR; INTRAVENOUS
Status: DISPENSED
Start: 2018-08-31

## (undated) RX ORDER — LIDOCAINE HYDROCHLORIDE 10 MG/ML
INJECTION, SOLUTION EPIDURAL; INFILTRATION; INTRACAUDAL; PERINEURAL
Status: DISPENSED
Start: 2018-05-10

## (undated) RX ORDER — DEXAMETHASONE SODIUM PHOSPHATE 4 MG/ML
INJECTION, SOLUTION INTRA-ARTICULAR; INTRALESIONAL; INTRAMUSCULAR; INTRAVENOUS; SOFT TISSUE
Status: DISPENSED
Start: 2018-08-31

## (undated) RX ORDER — BUPIVACAINE HYDROCHLORIDE 5 MG/ML
INJECTION, SOLUTION EPIDURAL; INTRACAUDAL
Status: DISPENSED
Start: 2018-05-10

## (undated) RX ORDER — LIDOCAINE HYDROCHLORIDE 10 MG/ML
INJECTION, SOLUTION EPIDURAL; INFILTRATION; INTRACAUDAL; PERINEURAL
Status: DISPENSED
Start: 2017-08-25

## (undated) RX ORDER — NEOSTIGMINE METHYLSULFATE 1 MG/ML
VIAL (ML) INJECTION
Status: DISPENSED
Start: 2018-08-31

## (undated) RX ORDER — ONDANSETRON 4 MG/1
TABLET, ORALLY DISINTEGRATING ORAL
Status: DISPENSED
Start: 2018-08-31

## (undated) RX ORDER — PROPOFOL 10 MG/ML
INJECTION, EMULSION INTRAVENOUS
Status: DISPENSED
Start: 2018-08-31

## (undated) RX ORDER — CEFAZOLIN SODIUM 2 G/100ML
INJECTION, SOLUTION INTRAVENOUS
Status: DISPENSED
Start: 2018-08-31

## (undated) RX ORDER — DEXAMETHASONE SODIUM PHOSPHATE 10 MG/ML
INJECTION, SOLUTION INTRAMUSCULAR; INTRAVENOUS
Status: DISPENSED
Start: 2017-08-25

## (undated) RX ORDER — LIDOCAINE HYDROCHLORIDE 20 MG/ML
INJECTION, SOLUTION EPIDURAL; INFILTRATION; INTRACAUDAL; PERINEURAL
Status: DISPENSED
Start: 2018-08-31

## (undated) RX ORDER — ONDANSETRON 2 MG/ML
INJECTION INTRAMUSCULAR; INTRAVENOUS
Status: DISPENSED
Start: 2018-08-31

## (undated) RX ORDER — GLYCOPYRROLATE 0.2 MG/ML
INJECTION, SOLUTION INTRAMUSCULAR; INTRAVENOUS
Status: DISPENSED
Start: 2018-08-31